# Patient Record
Sex: FEMALE | Race: WHITE | NOT HISPANIC OR LATINO | Employment: FULL TIME | URBAN - METROPOLITAN AREA
[De-identification: names, ages, dates, MRNs, and addresses within clinical notes are randomized per-mention and may not be internally consistent; named-entity substitution may affect disease eponyms.]

---

## 2017-05-01 ENCOUNTER — ALLSCRIPTS OFFICE VISIT (OUTPATIENT)
Dept: OTHER | Facility: OTHER | Age: 27
End: 2017-05-01

## 2017-05-15 ENCOUNTER — GENERIC CONVERSION - ENCOUNTER (OUTPATIENT)
Dept: OTHER | Facility: OTHER | Age: 27
End: 2017-05-15

## 2017-06-08 ENCOUNTER — ALLSCRIPTS OFFICE VISIT (OUTPATIENT)
Dept: OTHER | Facility: OTHER | Age: 27
End: 2017-06-08

## 2017-06-08 ENCOUNTER — TRANSCRIBE ORDERS (OUTPATIENT)
Dept: LAB | Facility: CLINIC | Age: 27
End: 2017-06-08

## 2017-06-08 ENCOUNTER — APPOINTMENT (OUTPATIENT)
Dept: LAB | Facility: CLINIC | Age: 27
End: 2017-06-08
Payer: COMMERCIAL

## 2017-06-08 DIAGNOSIS — N83.202 CYST OF LEFT OVARY: ICD-10-CM

## 2017-06-08 DIAGNOSIS — R10.13 EPIGASTRIC PAIN: ICD-10-CM

## 2017-06-08 DIAGNOSIS — J15.9 BACTERIAL PNEUMONIA: ICD-10-CM

## 2017-06-08 DIAGNOSIS — N17.9 ACUTE KIDNEY FAILURE (HCC): ICD-10-CM

## 2017-06-08 LAB
ALBUMIN SERPL BCP-MCNC: 3.1 G/DL (ref 3.5–5)
ALP SERPL-CCNC: 73 U/L (ref 46–116)
ALT SERPL W P-5'-P-CCNC: 29 U/L (ref 12–78)
ANION GAP SERPL CALCULATED.3IONS-SCNC: 9 MMOL/L (ref 4–13)
AST SERPL W P-5'-P-CCNC: 20 U/L (ref 5–45)
BASOPHILS # BLD AUTO: 0.02 THOUSANDS/ΜL (ref 0–0.1)
BASOPHILS NFR BLD AUTO: 0 % (ref 0–1)
BILIRUB SERPL-MCNC: 0.38 MG/DL (ref 0.2–1)
BUN SERPL-MCNC: 6 MG/DL (ref 5–25)
CALCIUM SERPL-MCNC: 8.8 MG/DL (ref 8.3–10.1)
CHLORIDE SERPL-SCNC: 105 MMOL/L (ref 100–108)
CO2 SERPL-SCNC: 30 MMOL/L (ref 21–32)
CREAT SERPL-MCNC: 1.4 MG/DL (ref 0.6–1.3)
EOSINOPHIL # BLD AUTO: 0.13 THOUSAND/ΜL (ref 0–0.61)
EOSINOPHIL NFR BLD AUTO: 2 % (ref 0–6)
ERYTHROCYTE [DISTWIDTH] IN BLOOD BY AUTOMATED COUNT: 14.1 % (ref 11.6–15.1)
GFR SERPL CREATININE-BSD FRML MDRD: 45.5 ML/MIN/1.73SQ M
GLUCOSE P FAST SERPL-MCNC: 107 MG/DL (ref 65–99)
HCT VFR BLD AUTO: 37.3 % (ref 34.8–46.1)
HGB BLD-MCNC: 12 G/DL (ref 11.5–15.4)
LYMPHOCYTES # BLD AUTO: 1.05 THOUSANDS/ΜL (ref 0.6–4.47)
LYMPHOCYTES NFR BLD AUTO: 12 % (ref 14–44)
MCH RBC QN AUTO: 28 PG (ref 26.8–34.3)
MCHC RBC AUTO-ENTMCNC: 32.2 G/DL (ref 31.4–37.4)
MCV RBC AUTO: 87 FL (ref 82–98)
MONOCYTES # BLD AUTO: 0.81 THOUSAND/ΜL (ref 0.17–1.22)
MONOCYTES NFR BLD AUTO: 9 % (ref 4–12)
NEUTROPHILS # BLD AUTO: 6.93 THOUSANDS/ΜL (ref 1.85–7.62)
NEUTS SEG NFR BLD AUTO: 77 % (ref 43–75)
NRBC BLD AUTO-RTO: 0 /100 WBCS
PLATELET # BLD AUTO: 276 THOUSANDS/UL (ref 149–390)
PMV BLD AUTO: 11 FL (ref 8.9–12.7)
POTASSIUM SERPL-SCNC: 3.3 MMOL/L (ref 3.5–5.3)
PROT SERPL-MCNC: 6.8 G/DL (ref 6.4–8.2)
RBC # BLD AUTO: 4.29 MILLION/UL (ref 3.81–5.12)
SODIUM SERPL-SCNC: 144 MMOL/L (ref 136–145)
WBC # BLD AUTO: 8.96 THOUSAND/UL (ref 4.31–10.16)

## 2017-06-08 PROCEDURE — 80053 COMPREHEN METABOLIC PANEL: CPT

## 2017-06-08 PROCEDURE — 36415 COLL VENOUS BLD VENIPUNCTURE: CPT

## 2017-06-08 PROCEDURE — 85025 COMPLETE CBC W/AUTO DIFF WBC: CPT

## 2017-06-23 ENCOUNTER — ALLSCRIPTS OFFICE VISIT (OUTPATIENT)
Dept: OTHER | Facility: OTHER | Age: 27
End: 2017-06-23

## 2017-08-10 ENCOUNTER — GENERIC CONVERSION - ENCOUNTER (OUTPATIENT)
Dept: OTHER | Facility: OTHER | Age: 27
End: 2017-08-10

## 2017-08-14 ENCOUNTER — ALLSCRIPTS OFFICE VISIT (OUTPATIENT)
Dept: OTHER | Facility: OTHER | Age: 27
End: 2017-08-14

## 2017-08-31 ENCOUNTER — TRANSCRIBE ORDERS (OUTPATIENT)
Dept: SLEEP CENTER | Facility: CLINIC | Age: 27
End: 2017-08-31

## 2017-08-31 DIAGNOSIS — R40.0 DAYTIME SLEEPINESS: Primary | ICD-10-CM

## 2017-11-01 ENCOUNTER — ALLSCRIPTS OFFICE VISIT (OUTPATIENT)
Dept: OTHER | Facility: OTHER | Age: 27
End: 2017-11-01

## 2017-11-03 ENCOUNTER — HOSPITAL ENCOUNTER (OUTPATIENT)
Dept: SLEEP CENTER | Facility: CLINIC | Age: 27
Discharge: HOME | End: 2017-11-03
Payer: COMMERCIAL

## 2017-11-03 ENCOUNTER — TRANSCRIBE ORDERS (OUTPATIENT)
Dept: SLEEP CENTER | Facility: CLINIC | Age: 27
End: 2017-11-03

## 2017-11-03 DIAGNOSIS — G47.33 OBSTRUCTIVE SLEEP APNEA SYNDROME: Primary | ICD-10-CM

## 2017-11-03 DIAGNOSIS — R40.0 DAYTIME SLEEPINESS: ICD-10-CM

## 2017-11-03 NOTE — PROGRESS NOTES
Consultation - [address]  32 y o  female  :1990  SSC:[id number]    Physician Requesting Consult: Rosina Shirley MD     Reason for Consult : At your kind request I saw this patient for initial sleep evaluation today  The patient is here to evaluate for suspected Obstructive Sleep Apnea  Medications, Past, Family and Social Histories were reviewed  Comorbidities are notable for:  Mood disturbance and irritability bowel syndrome  Herewith findings in summary  Full details are available from our records  HPI:  She presents with a complaint of fatigue of several years duration that has become extreme in the past 4 years  She is falling asleep inadvertently after meals and at work (that involves "sitting at a computer all day")  She is tired and drowsy irrespective of getting sufficient sleep and she feels her depression is controlled  However, she reports approximately 80 pounds weight gain since starting antidepressant medication (Celexa) around 4 years ago  She snores intermittently and this has gotten worse in the past 2 years  It can be loud enough to disturb her bed partner  She is not aware of breathing difficulties during sleep  She reported restless leg symptoms but very infrequently around once in 2 months  She sleep talks but reported no other features of parasomnia  She reported none of the ancillary symptoms of narcolepsy  Sleep Routine:  She is spending just under 9 hours in bed  She usually falls asleep in less than 10 minutes  She has no interruptions of sleep  She struggles to awaken with multiple alarms  She rated herself at 14/24 on the [place] Sleepiness Scale  Habits:  She is a former smoker  , she uses alcohol rarely  ,  has no drug history on file  , she uses limited caffeine  She does not exercise  ROS:  She reports GERDand cough  A 10  A 10 point review of systems was otherwise unremarkable      Physical Exam: Salient findings on exam, are a body mass index 39 9  Vitals are stable  She has a constricted affect  Mental state otherwise appears normal   Craniofacial anatomy is normal  Neck Circumference is 42 centimeters  There is excess fatty tissue and neck is thick  There are no abnormal neck masses  Nasal airway s patent]  Mucous membranes appeared normal  The oral airway is crowded  Base of tongue is at Modified Mallampati class III  [She has truncal obesity  The rest of her exam was unremarkable  Impression:   1  Possible obstructive sleep apnea  2  Restless leg syndrome  3  Hypersomnolence secondary to the above  4  Mood disturbance is contributing to her symptoms  5  Obesity     Plan:  1  With respect to above conditions, I counseled on pathophysiology, diagnosis, treatment options, risks and benefits; interrelationship and effects on symptoms and comorbidities; risks of no treatment; costs and insurance aspects  2  I advised on weight reduction, avoiding sleeping supine, using alcohol or sedating medications close to bed time and on safe driving practices  3  Nocturnal polysomnography is indicated and a diagnostic study will be scheduled  4  Patient is willing to try Positive airway pressure therapy and will be scheduled for a titration study if indicated  5  Follow-up will be scheduled after the studies to review results, further details of treatment options and to initiate/adjust therapy  Thank you for allowing me to participate in the care of this patient  I will keep you apprised of developments         Sincerely,    Vitor Garcia MD  Board Certified Specialist

## 2017-11-13 ENCOUNTER — HOSPITAL ENCOUNTER (EMERGENCY)
Facility: HOSPITAL | Age: 27
Discharge: HOME | End: 2017-11-13
Attending: EMERGENCY MEDICINE | Admitting: EMERGENCY MEDICINE
Payer: COMMERCIAL

## 2017-11-13 VITALS
OXYGEN SATURATION: 95 % | WEIGHT: 210 LBS | SYSTOLIC BLOOD PRESSURE: 126 MMHG | DIASTOLIC BLOOD PRESSURE: 68 MMHG | HEART RATE: 107 BPM | RESPIRATION RATE: 16 BRPM | TEMPERATURE: 98.2 F

## 2017-11-13 DIAGNOSIS — K62.5 RECTAL BLEEDING: Primary | ICD-10-CM

## 2017-11-13 LAB
ANION GAP BLD CALC-SCNC: 20 MMOL/L (ref 4–13)
BACTERIA UR QL AUTO: ABNORMAL /HPF
BILIRUB UR QL STRIP: NEGATIVE
BUN BLD-MCNC: 9 MG/DL (ref 5–25)
CA-I BLD-SCNC: 1.18 MMOL/L (ref 1.12–1.32)
CHLORIDE BLD-SCNC: 99 MMOL/L (ref 100–108)
CLARITY UR: CLEAR
COLOR UR: YELLOW
CREAT BLD-MCNC: 0.9 MG/DL (ref 0.6–1.3)
EXT PREG TEST URINE: NEGATIVE
GFR SERPL CREATININE-BSD FRML MDRD: 88 ML/MIN/1.73SQ M
GLUCOSE SERPL-MCNC: 116 MG/DL (ref 65–140)
GLUCOSE UR STRIP-MCNC: NEGATIVE MG/DL
HCT VFR BLD CALC: 40 % (ref 34.8–46.1)
HGB BLDA-MCNC: 13.6 G/DL (ref 11.5–15.4)
HGB UR QL STRIP.AUTO: ABNORMAL
KETONES UR STRIP-MCNC: NEGATIVE MG/DL
LEUKOCYTE ESTERASE UR QL STRIP: ABNORMAL
NITRITE UR QL STRIP: NEGATIVE
NON-SQ EPI CELLS URNS QL MICRO: ABNORMAL /HPF
PCO2 BLD: 25 MMOL/L (ref 21–32)
PH UR STRIP.AUTO: 6 [PH] (ref 4.5–8)
POTASSIUM BLD-SCNC: 3.7 MMOL/L (ref 3.5–5.3)
PROT UR STRIP-MCNC: NEGATIVE MG/DL
RBC #/AREA URNS AUTO: ABNORMAL /HPF
SODIUM BLD-SCNC: 139 MMOL/L (ref 136–145)
SP GR UR STRIP.AUTO: 1.01 (ref 1–1.03)
SPECIMEN SOURCE: ABNORMAL
UROBILINOGEN UR QL STRIP.AUTO: 0.2 E.U./DL
WBC #/AREA URNS AUTO: ABNORMAL /HPF

## 2017-11-13 PROCEDURE — 80047 BASIC METABLC PNL IONIZED CA: CPT

## 2017-11-13 PROCEDURE — 99284 EMERGENCY DEPT VISIT MOD MDM: CPT

## 2017-11-13 PROCEDURE — 81001 URINALYSIS AUTO W/SCOPE: CPT

## 2017-11-13 PROCEDURE — 81025 URINE PREGNANCY TEST: CPT | Performed by: EMERGENCY MEDICINE

## 2017-11-13 PROCEDURE — 85014 HEMATOCRIT: CPT

## 2017-11-13 PROCEDURE — 81002 URINALYSIS NONAUTO W/O SCOPE: CPT | Performed by: EMERGENCY MEDICINE

## 2017-11-13 NOTE — ED PROVIDER NOTES
History  Chief Complaint   Patient presents with    Rectal Bleeding     Pt states that she has been having pain while having a bowel movment x 2 weeks  Pt states for the past week she has noticed rectal bleeding when having a bm  Pt states today she urinated and stated she had "a lot" of rectal bleeding  Pt states she only has bleeding from her rectum when going to the bathroom and is not currently bleeding      A 80-year-old female with past medical history of IBS; presents with bright red blood per rectum  Patient states over the past few months she has been noticing intermittent episodes of blood within her stool  Patient states that she has pain with moving her bowels every time  Patient reports having loose stools baseline, denies straining  Patient states that today when moving her bowels she noticed blood, however during a later episode of urination she noticed blood with wiping  Patient does not notice blood within her underwear  Patient has since urinated without blood  Patient denies associated abdominal pain  Patient denies fever, chills, lightheadedness, dizziness, chest pain, shortness of breath, abdominal pain, dysuria, urinary frequency/urgency, hematuria, vaginal discharge/bleeding, peripheral edema and rashes  Patient does follow with a GI physician however has not seen them for the symptoms  A/P:  Rectal bleeding, symptoms and exam suspicious for internal hemorrhoid  Will check hemoglobin for anemia given prolonged course of symptoms  Will hold on any additional stool softeners given that patient has loose stools at baseline her GI physician for further evaluation  History provided by:  Patient      Prior to Admission Medications   Prescriptions Last Dose Informant Patient Reported? Taking?    Hyoscyamine-Phenyltoloxamine 9 4322-64 MG CAPS   Yes Yes   Sig: Take 1 tablet by mouth 3 (three) times a day before meals Taking only prn      Facility-Administered Medications: None       Past Medical History:   Diagnosis Date    GERD (gastroesophageal reflux disease)     IBS (irritable bowel syndrome)        History reviewed  No pertinent surgical history  History reviewed  No pertinent family history  I have reviewed and agree with the history as documented  Social History   Substance Use Topics    Smoking status: Never Smoker    Smokeless tobacco: Never Used    Alcohol use Yes      Comment: socially         Review of Systems   Gastrointestinal: Positive for blood in stool  All other systems reviewed and are negative  Physical Exam  ED Triage Vitals [11/13/17 1432]   Temperature Pulse Respirations Blood Pressure SpO2   98 2 °F (36 8 °C) (!) 120 18 142/76 97 %      Temp Source Heart Rate Source Patient Position - Orthostatic VS BP Location FiO2 (%)   Temporal Monitor Sitting Right arm --      Pain Score       3           Orthostatic Vital Signs  Vitals:    11/13/17 1432 11/13/17 1459   BP: 142/76 126/68   Pulse: (!) 120 (!) 107   Patient Position - Orthostatic VS: Sitting Lying       Physical Exam   General Appearance: alert and oriented, nad, non toxic appearing  Skin:  Warm, dry, intact  HEENT: atraumatic, normocephalic  Neck: Supple, trachea midline  Cardiac: RRR; no murmurs, rub, gallops  Pulmonary: lungs CTAB; no wheezes, rales, rhonchi  Gastrointestinal: abdomen soft, nontender, nondistended; no guarding or rebound tenderness; good bowel sounds, no mass or bruits  Rectal Exam: No external hemorrhoids or anal fissures noted  Area of inflammation, about size of pea, palpated at 6 o'clock position, area is tender and likely consistent with internal hemorrhoid  Small amount of stool noted within the rectal vault, does have pink color appearance    Extremities:  no pedal edema, 2+ pulses; no calf tenderness, no clubbing, no cyanosis  Neuro:  no focal motor or sensory deficits, CN 2-12 grossly intact  Psych:  Normal mood and affect, normal judgement and insight      ED Medications  Medications - No data to display    Diagnostic Studies  Results Reviewed     Procedure Component Value Units Date/Time    Urine Microscopic [[id number]]  (Abnormal) Collected:  11/13/17 1636    Lab Status:  Final result Specimen:  Urine from Urine, Clean Catch Updated:  11/13/17 1549     RBC, UA 2-4 (A) /hpf      WBC, UA 2-4 (A) /hpf      Epithelial Cells Occasional /hpf      Bacteria, UA Occasional /hpf     POCT pregnancy, urine [[id number]]  (Normal) Resulted:  11/13/17 1522    Lab Status:  Final result Updated:  11/13/17 1522     EXT PREG TEST UR (Ref: Negative) negative    POCT urinalysis dipstick [[id number]]  (Abnormal) Resulted:  11/13/17 1522    Lab Status:  Final result Specimen:  Urine Updated:  11/13/17 1522    POCT Chem 8+ [[id number]]  (Abnormal) Collected:  11/13/17 1517    Lab Status:  Final result Updated:  11/13/17 1522     SODIUM, I-STAT 139 mmol/l      Potassium, i-STAT 3 7 mmol/L      Chloride, istat 99 (L) mmol/L      CO2, i-STAT 25 mmol/L      Anion Gap, Istat 20 (H) mmol/L      Calcium, Ionized i-STAT 1 18 mmol/L      BUN, I-STAT 9 mg/dl      Creatinine, i-STAT 0 9 mg/dl      eGFR 88 ml/min/1 73sq m      Glucose, i-STAT 116 mg/dl      Hct, i-STAT 40 %      Hgb, i-STAT 13 6 g/dl      Specimen Type VENOUS    ED Urine Macroscopic [[id number]]  (Abnormal) Collected:  11/13/17 1636    Lab Status:  Final result Specimen:  Urine Updated:  11/13/17 1521     Color, UA Yellow     Clarity, UA Clear     pH, UA 6 0     Leukocytes, UA Trace (A)     Nitrite, UA Negative     Protein, UA Negative mg/dl      Glucose, UA Negative mg/dl      Ketones, UA Negative mg/dl      Urobilinogen, UA 0 2 E U /dl      Bilirubin, UA Negative     Blood, UA Trace (A)     Specific [place], UA 1 015    Narrative:       CLINITEK RESULT                 No orders to display         Procedures  Procedures      Phone Consults  ED Phone Contact    ED Course  ED Course as of Nov 13 1729 Mon Nov 13, 2017   1523 Hemoglobin, Istat: 13 6                               [facility]  CritCare Time    Disposition  Final diagnoses:   Rectal bleeding     Time reflects when diagnosis was documented in both MDM as applicable and the Disposition within this note     Time User Action Codes Description Comment    11/13/2017  3:00 PM Nick, [address] [[address] 5] Rectal bleeding       ED Disposition     ED Disposition Condition Comment    Discharge  Chelsea Mehta discharge to home/self care  Condition at discharge: Good        Follow-up Information     Follow up With Specialties Details Why Contact Info Additional Information    GI Physician  Schedule an appointment as soon as possible for a visit today For re-evaluation      [address]  Emergency Department Emergency Medicine Go to If symptoms worsen [facility] [facility]  [phone] [place] [facility], [address] [place] [address], [id number]        There are no discharge medications for this patient  No discharge procedures on file  ED Provider  Attending physically available and evaluated Chelsea Mehta  I managed the patient along with the ED Attending      Electronically Signed by         Jaquelin Zhang DO  Resident  11/13/17 0040

## 2017-11-13 NOTE — DISCHARGE INSTRUCTIONS
Hemorrhoids   AMBULATORY CARE:   Hemorrhoids  are swollen blood vessels inside your rectum (internal hemorrhoids) or on your anus (external hemorrhoids)  Sometimes a hemorrhoid may prolapse  This means it extends out of your anus  Common symptoms include the following:   · Pain or itching around your anus or inside your rectum    · Swelling or bumps around your anus    · Bright red blood in your bowel movement, on the toilet paper, or in the toilet bowl    · Tissue bulging out of your anus (prolapsed hemorrhoids)    · Incontinence (poor control over urine or bowel movements)  Seek care immediately if:   · You have severe pain in your rectum or around your anus  · You have severe pain in your abdomen and you are vomiting  · You have bleeding from your anus that soaks through your underwear  Contact your healthcare provider if:   · You have frequent and painful bowel movements  · Your hemorrhoid looks or feels more swollen than usual      · You do not have a bowel movement for 2 days or more  · You see or feel tissue coming through your anus  · You have questions or concerns about your condition or care  Treatment for hemorrhoids  may include medicines to decrease pain, swelling, and itching  The medicine may be a pill, pad, cream, or ointment  Medicine may also be given to soften your bowel movement  Surgery and other procedures may be needed to shrink or remove your hemorrhoids  Manage your symptoms:   · Apply ice on your anus for 15 to 20 minutes every hour or as directed  Use an ice pack, or put crushed ice in a plastic bag  Cover it with a towel before you apply it to your anus  Ice helps prevent tissue damage and decreases swelling and pain  · Take a sitz bath  Fill a bathtub with 4 to 6 inches of warm water  You may also use a sitz bath pan that fits inside a toilet bowl  Sit in the sitz bath for 15 minutes  Do this 3 times a day, and after each bowel movement   The warm water can help decrease pain and swelling  · Keep your anal area clean  Gently wash the area with warm water daily  Soap may irritate the area  After a bowel movement, wipe with moist towelettes or wet toilet paper  Dry toilet paper can irritate the area  Prevent hemorrhoids:   · Do not strain to have a bowel movement  Do not sit on the toilet too long  These actions can increase pressure on the tissues in your rectum and anus  · Drink plenty of liquids  Liquids can help prevent constipation  Ask how much liquid to drink each day and which liquids are best for you  · Eat a variety of high-fiber foods  Examples include fruits, vegetables, and whole grains  Ask your healthcare provider how much fiber you need each day  You may need to take a fiber supplement  · Exercise as directed  Exercise, such as walking, may make it easier to have a bowel movement  Ask your healthcare provider to help you create an exercise plan  · Do not have anal sex  Anal sex can weaken the skin around your rectum and anus  · Avoid heavy lifting  This can cause straining and increase your risk for another hemorrhoid  Follow up with your healthcare provider as directed:  Write down your questions so you remember to ask them during your visits  © 2017 [employer] Information is for End User's use only and may not be sold, redistributed or otherwise used for commercial purposes  All illustrations and images included in CareNotes® are the copyrighted property of A D A M [employer] Inc  or Erlin Álvarez  The above information is an  only  It is not intended as medical advice for individual conditions or treatments  Talk to your doctor, nurse or pharmacist before following any medical regimen to see if it is safe and effective for you

## 2017-11-13 NOTE — ED ATTENDING ATTESTATION
Joanne Temple MD, saw and evaluated the patient  I have discussed the patient with the resident/non-physician practitioner and agree with the resident's/non-physician practitioner's findings, Plan of Care, and MDM as documented in the resident's/non-physician practitioner's note, except where noted  All available labs and Radiology studies were reviewed  At this point I agree with the current assessment done in the Emergency Department  I have conducted an independent evaluation of this patient a history and physical is as follows:      Critical Care Time  CritCare Time      Pt  Has pain with bm's over the past few months  She had  bright red blood per rectum over the past few days  Today she had a bm and some discomfort and then noticed some blood in the stool and then noticed it when wiping later  Pt  Normally isn't constipated and has somewhat loose stools from IBS  No abd  Pain, no fevers, no n/v/d    Pt  Has a hx of IBS - sees GI specialist    Not on blood thinners    Well-appearing, no distress, RRR, CTA b/l, abd  -nontender, rectal exam, +internal hemorrhoid - mildly tender, no external hemorrhoid, no fissures, heme occult positive, control positive    H/H stable - pt  Make follow up with GI dr  As an outpt

## 2017-11-13 NOTE — ED NOTES
Chaperoned resident for patient's rectal exam   No obvious fissures or external findings  Patient reports for the past couple months has noticed blood with BMs, but not every time  States has rectal burning with BM's  Stools are not black  No abdominal pain or dizziness          Michelle Cedeño RN  11/13/17 [address] [place] [address] [place] a[place]blo  11/13/17 5979

## 2017-11-24 ENCOUNTER — ALLSCRIPTS OFFICE VISIT (OUTPATIENT)
Dept: OTHER | Facility: OTHER | Age: 27
End: 2017-11-24

## 2017-11-24 DIAGNOSIS — K92.1 MELENA: ICD-10-CM

## 2017-11-24 DIAGNOSIS — R19.7 DIARRHEA: ICD-10-CM

## 2017-11-24 DIAGNOSIS — K52.9 NONINFECTIVE GASTROENTERITIS AND COLITIS: ICD-10-CM

## 2017-11-25 NOTE — PROGRESS NOTES
Assessment  1  Blood in stool (578 1) (K92 1)    Plan  Blood in stool    · (1) BASIC METABOLIC PROFILE; Status:Active; Requested DQK:[id number];    Perform:[facility]; [place]:[id number]; Ordered; For:Blood in stool; Ordered By:Alverto Roman;   · (1) CBC/PLT/DIFF; Status:Active; Requested [id number];    Perform:[facility]; JFO:07VHE3286; Ordered; For:Blood in stool; Ordered By:Alverto Roman;   · (1) C-REACTIVE PROTEIN; Status:Active; Requested LNB:[id number];    Perform:[facility]; EED:40IQN9420; Ordered; For:Blood in stool; Ordered By:Alverto Roman;   · (1) HEPATIC FUNCTION PANEL; Status:Active; Requested GSH:[id number];    Perform:[facility]; [id number]; Ordered; For:Blood in stool; Ordered By:Alverto Roman;   · (1) LACTOFERRIN, STOOL; Status:Active; Requested KBD:31QDV0026;    Perform:[facility]; HIS:15WCO6121; Ordered; For:Blood in stool; Ordered By:Alverto Roman;   · (1) SED RATE; Status:Active; Requested QJB:[id number];    Perform:[facility]; PVF:68TAV7973; Ordered; For:Blood in stool; Ordered By:Alverto Roman;   · COLONOSCOPY (GI, SURG); Status:Active; Requested UUJ:38QVV4602;    Perform:[facility]; DYP:50ENH6450; Last Updated Asher Pernell; 2017 2:52:08 PM;Ordered; For:Blood in stool; Ordered By:Alverto Roman;   · EGD; Status:Active; Requested IZV:89XIK5760;    Perform:[facility]; [phone]; Last Updated Asher Pernell; 2017 2:52:08 PM;Ordered;in stool; Ordered By:Yamile Roman;   · Follow-up visit in 2 months Evaluation and Treatment  Follow-up  Status: Hold For -Scheduling  Requested for: 03TVH2719   Ordered; For: Blood in stool; Ordered By: Kylee Ballesteros Performed:  Due: 28TIT8335  Depression with anxiety    · Citalopram Hydrobromide 10 MG Oral Tablet   Rx By: Doreen Correa; Dispense: 14 Days ; #:14 Tablet; Refill: 0;Depression with anxiety; JOHN = N; Record; Msg to Pharmacy: Take for 2 weeks then stop;  Last Updated By: Brando Allison; 11/24/2017 2:14:50 PM    Discussion/Summary  Discussion Summary:   Rectal bleeding with loose stools:CBC to rule out anemialabs to evaluate for inflammatory bowel disease  Schedule colonoscopy with TI intubation to evaluate for inflammatory bowel disease  Acid reflux:not relieved by PPI  It is possible it is caused by significant weight gain  Schedule EGD as patient has GI bleeding  Patient was counseled on the benefits and risks of endoscopic intervention including but not limited to bleeding, infection, bowel perforation  Counseling Documentation With Imm: The patient was counseled regarding diagnostic results,-- instructions for management,-- impressions,-- risks and benefits of treatment options  Chief Complaint  Chief Complaint Free Text Note Form: f/u for IBS with diarrhea  Pt c/o blood in stool, painful bm's, and abdominal pain  History of Present Illness  HPI: 80-year-old female presented for follow-up on loose stool  Patient was seen last year for irritable bowel syndrome and she lost follow-up with us  Patient reported she has been having blood in stool in the past several months, in the past 1 week she reported blood clot  Patient also reported a notice from comfort and pain while she is passing the stool  She reported going to the bathroom 3 to 4 times a day  Mostly loose stool  She reported intermittent abdominal pain  She underwent a colonoscopy with an gastroenterologist from another practice in 2015 and was told she has IBS  Patient denies weight loss, rather she gained significant amount of weight in the past 2 years  She also reported her acid reflux has worsened  She has been taking omeprazole 20 mg a day with partial relief of his symptoms  Review of Systems  Complete-Female GI Adult:  Constitutional: No fever, no chills, feels well, no tiredness, no recent weight gain or weight loss    Eyes: No complaints of eye pain, no red eyes, no eyesight problems, no discharge, no dry eyes, no itching of eyes  ENT: no complaints of earache, no loss of hearing, no nose bleeds, no nasal discharge, no sore throat, no hoarseness  Cardiovascular: No complaints of slow heart rate, no fast heart rate, no chest pain, no palpitations, no leg claudication, no lower extremity edema  Respiratory: No complaints of shortness of breath, no wheezing, no cough, no SOB on exertion, no orthopnea, no PND  Gastrointestinal: abdominal pain-- and-- bloody stools, but-- as noted in HPI  Genitourinary: No complaints of dysuria, no incontinence, no pelvic pain, no dysmenorrhea, no vaginal discharge or bleeding  Musculoskeletal: No complaints of arthralgias, no myalgias, no joint swelling or stiffness, no limb pain or swelling  Integumentary: No complaints of skin rash or lesions, no itching, no skin wounds, no breast pain or lump  Neurological: No complaints of headache, no confusion, no convulsions, no numbness, no dizziness or fainting, no tingling, no limb weakness, no difficulty walking  Psychiatric: Not suicidal, no sleep disturbance, no anxiety or depression, no change in personality, no emotional problems  Endocrine: No complaints of proptosis, no hot flashes, no muscle weakness, no deepening of the voice, no feelings of weakness  Hematologic/Lymphatic: No complaints of swollen glands, no swollen glands in the neck, does not bleed easily, does not bruise easily  Active Problems  1  Acute bacterial sinusitis (461 9) (J01 90,B96 89)   2  Acute kidney injury (584 9) (N17 9)   3  Anxiety, generalized (300 02) (F41 1)   4  Candidiasis of skin (112 3) (B37 2)   5  Chronic diarrhea (787 91) (K52 9)   6  Chronic fatigue (780 79) (R53 82)   7  Community acquired bacterial pneumonia (482 9) (J15 9)   8  Depression with anxiety (300 4) (F41 8)   9  Dyspepsia (536 8) (R10 13)   10  Herpes labialis (054 9) (B00 1)   11  Irritable bowel syndrome with diarrhea (564 1) (K58 0)   12   Left ovarian cyst (620 2) (N83 202) 13  Sleep pattern disturbance (780 50) (G47 20)   14  Strain of thoracic region, initial encounter (847 1) (S29 019A)   15  Visit for routine gyn exam (V72 31) (Z01 419)    Past Medical History  1  History of Encounter to establish care with new doctor (V65 8) (Z76 89)   2  History of upper respiratory infection (V12 09) (Z87 09)  Active Problems And Past Medical History Reviewed: The active problems and past medical history were reviewed and updated today  Surgical History  1  History of Ankle Surgery   2  History of Wrist Surgery    Family History  Mother    1  Denied: Family history of Colon cancer   2  Denied: Family history of colonic polyps   3  Denied: Family history of Crohn's disease   4  Denied: Family history of liver disease   5  Family history of Melanoma  Father    6  Denied: Family history of Colon cancer   7  Denied: Family history of colonic polyps   8  Denied: Family history of Crohn's disease   9  Denied: Family history of liver disease  Maternal Grandmother    8  Family history of colitis (V18 59) (Z83 79)    Social History     · Never a smoker   · No alcohol use   · No drug use    Current Meds   1  Citalopram Hydrobromide 10 MG Oral Tablet; TAKE 1 TABLET ONCE  Requested for: 88IFH1485; Last Rx:01Nov2017 Ordered   2  Hyoscyamine TABS; Therapy: (Recorded:57Ufq5235) to Recorded   3  Nortriptyline HCl - 10 MG Oral Capsule; TAKE 1 CAPSULE Once At Bedtime; Therapy: 61JDH1186 to (Evaluate:11Eaw3149)  Requested for: 69CHX9843; Last Rx:01Nov2017 Ordered   4  Nortriptyline HCl - 25 MG Oral Capsule; TAKE 1 CAPSULE AT BEDTIME; Therapy: 35CBH4377 to (Evaluate:92Bty7263)  Requested for: 77CVU2226; Last Rx:01Nov2017 Ordered   5  Omeprazole 20 MG Oral Capsule Delayed Release; TAKE ONE CAPSULE BY MOUTH EVERY DAY  Requested for: 23Zfb2211; Last Rx:36Yla6230 Ordered  Medication List Reviewed: The medication list was reviewed and updated today  Allergies  1  No Known Drug Allergies  2   Animal dander   3  Dust   4  Mold   5  Seasonal    Vitals  Vital Signs    Recorded: 87WRA8141 02:14PM   Temperature 97 9 F, Tympanic   Heart Rate 222   Systolic 521   Diastolic 74   Height 5 ft 2 in   Weight 229 lb    BMI Calculated 41 88   BSA Calculated 2 02   O2 Saturation 98, RA       Physical Exam   Constitutional Morbid obesity  Eyes  Conjunctiva and lids: No swelling, erythema or discharge  Ears, Nose, Mouth, and Throat  Oropharynx: Normal with no erythema, edema, exudate or lesions  Pulmonary  Respiratory effort: No increased work of breathing or signs of respiratory distress  Cardiovascular  Examination of extremities for edema and/or varicosities: Normal    Abdomen  Abdomen: Non-tender, no masses  Musculoskeletal  Gait and station: Normal    Psychiatric  Orientation to person, place, and time: Normal          Future Appointments    Date/Time Provider Specialty Site   01/04/2018 02:15 PM Keith Luong MD Gastroenterology Adult [facility] OUT[facility]IENT   12/04/2017 04:00 PM NHAN Ely   Psychiatry [facility] 81       Signatures   Electronically signed by : Migue Abreu MD; Nov 24 2017  3:41PM EST                       (Author)

## 2017-11-28 ENCOUNTER — HOSPITAL ENCOUNTER (EMERGENCY)
Facility: HOSPITAL | Age: 27
Discharge: HOME | End: 2017-11-29
Attending: EMERGENCY MEDICINE | Admitting: EMERGENCY MEDICINE
Payer: COMMERCIAL

## 2017-11-28 ENCOUNTER — APPOINTMENT (OUTPATIENT)
Dept: LAB | Facility: HOSPITAL | Age: 27
End: 2017-11-28
Attending: INTERNAL MEDICINE
Payer: COMMERCIAL

## 2017-11-28 ENCOUNTER — GENERIC CONVERSION - ENCOUNTER (OUTPATIENT)
Dept: OTHER | Facility: OTHER | Age: 27
End: 2017-11-28

## 2017-11-28 ENCOUNTER — TRANSCRIBE ORDERS (OUTPATIENT)
Dept: LAB | Facility: HOSPITAL | Age: 27
End: 2017-11-28

## 2017-11-28 ENCOUNTER — APPOINTMENT (EMERGENCY)
Dept: CT IMAGING | Facility: HOSPITAL | Age: 27
End: 2017-11-28
Payer: COMMERCIAL

## 2017-11-28 VITALS
TEMPERATURE: 98.4 F | HEART RATE: 113 BPM | DIASTOLIC BLOOD PRESSURE: 72 MMHG | RESPIRATION RATE: 18 BRPM | SYSTOLIC BLOOD PRESSURE: 126 MMHG | OXYGEN SATURATION: 98 %

## 2017-11-28 DIAGNOSIS — R10.30 LOWER ABDOMINAL PAIN: Primary | ICD-10-CM

## 2017-11-28 DIAGNOSIS — Z87.19 HISTORY OF BLOODY STOOLS: ICD-10-CM

## 2017-11-28 DIAGNOSIS — K52.9 COLITIS: ICD-10-CM

## 2017-11-28 DIAGNOSIS — K92.1 MELENA: ICD-10-CM

## 2017-11-28 LAB
ALBUMIN SERPL BCP-MCNC: 3.3 G/DL (ref 3.5–5)
ALBUMIN SERPL BCP-MCNC: 3.4 G/DL (ref 3.5–5)
ALP SERPL-CCNC: 81 U/L (ref 46–116)
ALP SERPL-CCNC: 85 U/L (ref 46–116)
ALT SERPL W P-5'-P-CCNC: 40 U/L (ref 12–78)
ALT SERPL W P-5'-P-CCNC: 43 U/L (ref 12–78)
ANION GAP SERPL CALCULATED.3IONS-SCNC: 6 MMOL/L (ref 4–13)
ANION GAP SERPL CALCULATED.3IONS-SCNC: 8 MMOL/L (ref 4–13)
AST SERPL W P-5'-P-CCNC: 22 U/L (ref 5–45)
AST SERPL W P-5'-P-CCNC: 23 U/L (ref 5–45)
BASOPHILS # BLD AUTO: 0 THOUSANDS/ΜL (ref 0–0.1)
BASOPHILS # BLD AUTO: 0.02 THOUSANDS/ΜL (ref 0–0.1)
BASOPHILS NFR BLD AUTO: 0 % (ref 0–1)
BASOPHILS NFR BLD AUTO: 0 % (ref 0–1)
BILIRUB DIRECT SERPL-MCNC: 0.14 MG/DL (ref 0–0.2)
BILIRUB SERPL-MCNC: 0.45 MG/DL (ref 0.2–1)
BILIRUB SERPL-MCNC: 0.51 MG/DL (ref 0.2–1)
BILIRUB UR QL STRIP: NEGATIVE
BUN SERPL-MCNC: 10 MG/DL (ref 5–25)
BUN SERPL-MCNC: 11 MG/DL (ref 5–25)
CALCIUM SERPL-MCNC: 8.7 MG/DL (ref 8.3–10.1)
CALCIUM SERPL-MCNC: 9 MG/DL (ref 8.3–10.1)
CHLORIDE SERPL-SCNC: 103 MMOL/L (ref 100–108)
CHLORIDE SERPL-SCNC: 106 MMOL/L (ref 100–108)
CLARITY UR: CLEAR
CO2 SERPL-SCNC: 23 MMOL/L (ref 21–32)
CO2 SERPL-SCNC: 27 MMOL/L (ref 21–32)
COLOR UR: YELLOW
CREAT SERPL-MCNC: 0.76 MG/DL (ref 0.6–1.3)
CREAT SERPL-MCNC: 0.86 MG/DL (ref 0.6–1.3)
CRP SERPL QL: 14.2 MG/L
EOSINOPHIL # BLD AUTO: 0.16 THOUSAND/ΜL (ref 0–0.61)
EOSINOPHIL # BLD AUTO: 0.19 THOUSAND/ΜL (ref 0–0.61)
EOSINOPHIL NFR BLD AUTO: 1 % (ref 0–6)
EOSINOPHIL NFR BLD AUTO: 2 % (ref 0–6)
ERYTHROCYTE [DISTWIDTH] IN BLOOD BY AUTOMATED COUNT: 13.7 % (ref 11.6–15.1)
ERYTHROCYTE [DISTWIDTH] IN BLOOD BY AUTOMATED COUNT: 13.8 % (ref 11.6–15.1)
ERYTHROCYTE [SEDIMENTATION RATE] IN BLOOD: 16 MM/HOUR (ref 0–20)
EXT PREG TEST URINE: NEGATIVE
GFR SERPL CREATININE-BSD FRML MDRD: 108 ML/MIN/1.73SQ M
GFR SERPL CREATININE-BSD FRML MDRD: 93 ML/MIN/1.73SQ M
GLUCOSE SERPL-MCNC: 95 MG/DL (ref 65–140)
GLUCOSE SERPL-MCNC: 98 MG/DL (ref 65–140)
GLUCOSE UR STRIP-MCNC: NEGATIVE MG/DL
HCT VFR BLD AUTO: 40 % (ref 34.8–46.1)
HCT VFR BLD AUTO: 40.5 % (ref 34.8–46.1)
HGB BLD-MCNC: 13.2 G/DL (ref 11.5–15.4)
HGB BLD-MCNC: 13.4 G/DL (ref 11.5–15.4)
HGB UR QL STRIP.AUTO: ABNORMAL
KETONES UR STRIP-MCNC: NEGATIVE MG/DL
LEUKOCYTE ESTERASE UR QL STRIP: ABNORMAL
LIPASE SERPL-CCNC: 80 U/L (ref 73–393)
LYMPHOCYTES # BLD AUTO: 1.2 THOUSANDS/ΜL (ref 0.6–4.47)
LYMPHOCYTES # BLD AUTO: 1.38 THOUSANDS/ΜL (ref 0.6–4.47)
LYMPHOCYTES NFR BLD AUTO: 13 % (ref 14–44)
LYMPHOCYTES NFR BLD AUTO: 9 % (ref 14–44)
MCH RBC QN AUTO: 27.6 PG (ref 26.8–34.3)
MCH RBC QN AUTO: 28 PG (ref 26.8–34.3)
MCHC RBC AUTO-ENTMCNC: 33 G/DL (ref 31.4–37.4)
MCHC RBC AUTO-ENTMCNC: 33.1 G/DL (ref 31.4–37.4)
MCV RBC AUTO: 83 FL (ref 82–98)
MCV RBC AUTO: 85 FL (ref 82–98)
MONOCYTES # BLD AUTO: 0.75 THOUSAND/ΜL (ref 0.17–1.22)
MONOCYTES # BLD AUTO: 0.79 THOUSAND/ΜL (ref 0.17–1.22)
MONOCYTES NFR BLD AUTO: 6 % (ref 4–12)
MONOCYTES NFR BLD AUTO: 7 % (ref 4–12)
NEUTROPHILS # BLD AUTO: 10.84 THOUSANDS/ΜL (ref 1.85–7.62)
NEUTROPHILS # BLD AUTO: 8.69 THOUSANDS/ΜL (ref 1.85–7.62)
NEUTS SEG NFR BLD AUTO: 78 % (ref 43–75)
NEUTS SEG NFR BLD AUTO: 84 % (ref 43–75)
NITRITE UR QL STRIP: NEGATIVE
NRBC BLD AUTO-RTO: 0 /100 WBCS
NRBC BLD AUTO-RTO: 0 /100 WBCS
PH UR STRIP.AUTO: 5.5 [PH] (ref 4.5–8)
PLATELET # BLD AUTO: 319 THOUSANDS/UL (ref 149–390)
PLATELET # BLD AUTO: 394 THOUSANDS/UL (ref 149–390)
PMV BLD AUTO: 10.2 FL (ref 8.9–12.7)
PMV BLD AUTO: 10.3 FL (ref 8.9–12.7)
POTASSIUM SERPL-SCNC: 3.5 MMOL/L (ref 3.5–5.3)
POTASSIUM SERPL-SCNC: 3.6 MMOL/L (ref 3.5–5.3)
PROT SERPL-MCNC: 7.1 G/DL (ref 6.4–8.2)
PROT SERPL-MCNC: 7.2 G/DL (ref 6.4–8.2)
PROT UR STRIP-MCNC: ABNORMAL MG/DL
RBC # BLD AUTO: 4.71 MILLION/UL (ref 3.81–5.12)
RBC # BLD AUTO: 4.86 MILLION/UL (ref 3.81–5.12)
SODIUM SERPL-SCNC: 136 MMOL/L (ref 136–145)
SODIUM SERPL-SCNC: 137 MMOL/L (ref 136–145)
SP GR UR STRIP.AUTO: 1.01 (ref 1–1.03)
UROBILINOGEN UR QL STRIP.AUTO: 0.2 E.U./DL
WBC # BLD AUTO: 11.05 THOUSAND/UL (ref 4.31–10.16)
WBC # BLD AUTO: 13.02 THOUSAND/UL (ref 4.31–10.16)

## 2017-11-28 PROCEDURE — 80048 BASIC METABOLIC PNL TOTAL CA: CPT

## 2017-11-28 PROCEDURE — 96374 THER/PROPH/DIAG INJ IV PUSH: CPT

## 2017-11-28 PROCEDURE — 85025 COMPLETE CBC W/AUTO DIFF WBC: CPT

## 2017-11-28 PROCEDURE — 86140 C-REACTIVE PROTEIN: CPT

## 2017-11-28 PROCEDURE — 74177 CT ABD & PELVIS W/CONTRAST: CPT

## 2017-11-28 PROCEDURE — 81002 URINALYSIS NONAUTO W/O SCOPE: CPT | Performed by: EMERGENCY MEDICINE

## 2017-11-28 PROCEDURE — 36415 COLL VENOUS BLD VENIPUNCTURE: CPT

## 2017-11-28 PROCEDURE — 80076 HEPATIC FUNCTION PANEL: CPT

## 2017-11-28 PROCEDURE — 85025 COMPLETE CBC W/AUTO DIFF WBC: CPT | Performed by: EMERGENCY MEDICINE

## 2017-11-28 PROCEDURE — 80053 COMPREHEN METABOLIC PANEL: CPT | Performed by: EMERGENCY MEDICINE

## 2017-11-28 PROCEDURE — 85652 RBC SED RATE AUTOMATED: CPT

## 2017-11-28 PROCEDURE — 96375 TX/PRO/DX INJ NEW DRUG ADDON: CPT

## 2017-11-28 PROCEDURE — 81025 URINE PREGNANCY TEST: CPT | Performed by: EMERGENCY MEDICINE

## 2017-11-28 PROCEDURE — 96361 HYDRATE IV INFUSION ADD-ON: CPT

## 2017-11-28 PROCEDURE — 83690 ASSAY OF LIPASE: CPT | Performed by: EMERGENCY MEDICINE

## 2017-11-28 RX ORDER — MORPHINE SULFATE 4 MG/ML
4 INJECTION, SOLUTION INTRAMUSCULAR; INTRAVENOUS ONCE
Status: COMPLETED | OUTPATIENT
Start: 2017-11-28 | End: 2017-11-28

## 2017-11-28 RX ORDER — ONDANSETRON 2 MG/ML
4 INJECTION INTRAMUSCULAR; INTRAVENOUS ONCE
Status: COMPLETED | OUTPATIENT
Start: 2017-11-28 | End: 2017-11-28

## 2017-11-28 RX ORDER — NORTRIPTYLINE HYDROCHLORIDE 25 MG/1
CAPSULE ORAL
COMMUNITY
Start: 2017-11-15 | End: 2018-04-09 | Stop reason: ALTCHOICE

## 2017-11-28 RX ADMIN — SODIUM CHLORIDE 1000 ML: 0.9 INJECTION, SOLUTION INTRAVENOUS at 22:53

## 2017-11-28 RX ADMIN — ONDANSETRON 4 MG: 2 INJECTION INTRAMUSCULAR; INTRAVENOUS at 22:53

## 2017-11-28 RX ADMIN — IOHEXOL 100 ML: 350 INJECTION, SOLUTION INTRAVENOUS at 23:58

## 2017-11-28 RX ADMIN — MORPHINE SULFATE 4 MG: 4 INJECTION, SOLUTION INTRAMUSCULAR; INTRAVENOUS at 22:55

## 2017-11-29 ENCOUNTER — ALLSCRIPTS OFFICE VISIT (OUTPATIENT)
Dept: OTHER | Facility: OTHER | Age: 27
End: 2017-11-29

## 2017-11-29 LAB
BACTERIA UR QL AUTO: ABNORMAL /HPF
NON-SQ EPI CELLS URNS QL MICRO: ABNORMAL /HPF
RBC #/AREA URNS AUTO: ABNORMAL /HPF
WBC #/AREA URNS AUTO: ABNORMAL /HPF

## 2017-11-29 PROCEDURE — 81001 URINALYSIS AUTO W/SCOPE: CPT

## 2017-11-29 PROCEDURE — 96372 THER/PROPH/DIAG INJ SC/IM: CPT

## 2017-11-29 PROCEDURE — 99284 EMERGENCY DEPT VISIT MOD MDM: CPT

## 2017-11-29 RX ORDER — KETOROLAC TROMETHAMINE 30 MG/ML
30 INJECTION, SOLUTION INTRAMUSCULAR; INTRAVENOUS ONCE
Status: COMPLETED | OUTPATIENT
Start: 2017-11-29 | End: 2017-11-29

## 2017-11-29 RX ORDER — CIPROFLOXACIN 500 MG/1
500 TABLET, FILM COATED ORAL ONCE
Status: COMPLETED | OUTPATIENT
Start: 2017-11-29 | End: 2017-11-29

## 2017-11-29 RX ORDER — DICYCLOMINE HCL 20 MG
20 TABLET ORAL ONCE
Status: COMPLETED | OUTPATIENT
Start: 2017-11-29 | End: 2017-11-29

## 2017-11-29 RX ORDER — KETOROLAC TROMETHAMINE 30 MG/ML
30 INJECTION, SOLUTION INTRAMUSCULAR; INTRAVENOUS ONCE
Status: DISCONTINUED | OUTPATIENT
Start: 2017-11-29 | End: 2017-11-29

## 2017-11-29 RX ORDER — METRONIDAZOLE 500 MG/1
500 TABLET ORAL EVERY 8 HOURS SCHEDULED
Qty: 21 TABLET | Refills: 0 | Status: SHIPPED | OUTPATIENT
Start: 2017-11-29 | End: 2017-12-06

## 2017-11-29 RX ORDER — METRONIDAZOLE 500 MG/1
500 TABLET ORAL ONCE
Status: COMPLETED | OUTPATIENT
Start: 2017-11-29 | End: 2017-11-29

## 2017-11-29 RX ORDER — DICYCLOMINE HCL 20 MG
20 TABLET ORAL EVERY 6 HOURS PRN
Qty: 20 TABLET | Refills: 0 | Status: SHIPPED | OUTPATIENT
Start: 2017-11-29 | End: 2018-12-19

## 2017-11-29 RX ORDER — CIPROFLOXACIN 500 MG/1
500 TABLET, FILM COATED ORAL 2 TIMES DAILY
Qty: 14 TABLET | Refills: 0 | Status: SHIPPED | OUTPATIENT
Start: 2017-11-29 | End: 2017-12-06

## 2017-11-29 RX ADMIN — DICYCLOMINE HYDROCHLORIDE 20 MG: 20 TABLET ORAL at 00:54

## 2017-11-29 RX ADMIN — KETOROLAC TROMETHAMINE 30 MG: 30 INJECTION, SOLUTION INTRAMUSCULAR at 00:55

## 2017-11-29 RX ADMIN — METRONIDAZOLE 500 MG: 500 TABLET ORAL at 00:54

## 2017-11-29 RX ADMIN — CIPROFLOXACIN 500 MG: 500 TABLET, FILM COATED ORAL at 00:54

## 2017-11-29 NOTE — DISCHARGE INSTRUCTIONS
Colitis   WHAT YOU NEED TO KNOW:   Colitis is swelling and irritation of your colon  Colitis may be caused by ulcers or a problem with your immune system  Bacteria, a virus, or a parasite may also cause colitis  The cause may not be known  You may have diarrhea, abdominal pain, fever, or blood or mucus in your bowel movement  DISCHARGE INSTRUCTIONS:   Return to the emergency department if:   · You have sudden trouble breathing  · Your bowel movements are black or have blood in them  · You have blood in your vomit  · You have severe abdominal pain or your abdomen is swollen and feels hard  · You have any of the following signs of dehydration:     ¨ Dizziness or weakness    ¨ Dry mouth, cracked lips, or severe thirst    ¨ Fast heartbeat or breathing    ¨ Urinating very little or not at all  Contact your healthcare provider if:   · Your symptoms get worse or do not go away  · You have a fever, chills, cough, or feel weak and achy  · You suddenly lose weight without trying  · You have questions or concerns about your condition or care  Medicines:   · Medicines  may be given to decrease inflammation in your colon and treat diarrhea  · Take your medicine as directed  Contact your healthcare provider if you think your medicine is not helping or if you have side effects  Tell him of her if you are allergic to any medicine  Keep a list of the medicines, vitamins, and herbs you take  Include the amounts, and when and why you take them  Bring the list or the pill bottles to follow-up visits  Carry your medicine list with you in case of an emergency  Manage your symptoms:   · Drink liquids as directed  to help prevent dehydration  Good liquids to drink include water, juice, and broth  Ask how much liquid to drink each day  You may need to drink an oral rehydration solution (ORS)  An ORS contains a balance of water, salt, and sugar to replace body fluids lost during diarrhea       · Eat a variety of healthy foods  Healthy foods include fruits, vegetables, whole-grain breads, beans, low-fat dairy products, lean meats, and fish  You may need to eat several small meals throughout the day instead of large meals  Avoid spicy foods, caffeine, chocolate, and foods high in fat  · Talk to your healthcare provider before you take NSAIDs  NSAIDs can cause worsen your symptoms if ulcers are causing your colitis  · Start to exercise when you feel better  Regular exercise helps your bowels work normally  Ask about the best exercise plan for you  Follow up with your healthcare provider as directed: You may need to return for a colonoscopy or other tests  Write down how often you have a bowel movements and what they look like  Bring this to your follow-up visits  Write down your questions so you remember to ask them during your visits  © 2017 [address] Information is for End User's use only and may not be sold, redistributed or otherwise used for commercial purposes  All illustrations and images included in CareNotes® are the copyrighted property of A D [employer] M [employer]  or [address]  The above information is an  only  It is not intended as medical advice for individual conditions or treatments  Talk to your doctor, nurse or pharmacist before following any medical regimen to see if it is safe and effective for you  Acute Abdominal Pain   WHAT YOU NEED TO KNOW:   The cause of your abdominal pain may not be found  If a cause is found, treatment will depend on what the cause is  DISCHARGE INSTRUCTIONS:   Seek care immediately if:   · You vomit blood or cannot stop vomiting  · You have blood in your bowel movement or it looks like tar  · You have bleeding from your rectum  · Your abdomen is larger than usual, more painful, and hard  · You have severe pain in your abdomen  · You stop passing gas and having bowel movements       · You feel weak, dizzy, or faint  Contact your healthcare provider if:   · You have a fever  · You have new signs and symptoms  · Your symptoms do not get better with treatment  · You have questions or concerns about your condition or care  Medicines  may be given to decrease pain, treat an infection, and manage your symptoms  Take your medicine as directed  Call your healthcare provider if you think your medicine is not helping or if you have side effects  Tell him if you are allergic to any medicine  Keep a list of the medicines, vitamins, and herbs you take  Include the amounts, and when and why you take them  Bring the list or the pill bottles to follow-up visits  Carry your medicine list with you in case of an emergency  Manage your symptoms:   · Apply heat  on your abdomen for 20 to 30 minutes every 2 hours for as many days as directed  Heat helps decrease pain and muscle spasms  · Manage your stress  Stress may cause abdominal pain  Your healthcare provider may recommend relaxation techniques and deep breathing exercises to help decrease your stress  Your healthcare provider may recommend you talk to someone about your stress or anxiety, such as a counselor or a trusted friend  Get plenty of sleep and exercise regularly  · Limit or do not drink alcohol  Alcohol can make your abdominal pain worse  Ask your healthcare provider if it is safe for you to drink alcohol  Also ask how much is safe for you to drink  · Do not smoke  Nicotine and other chemicals in cigarettes can damage your esophagus and stomach  Ask your healthcare provider for information if you currently smoke and need help to quit  E-cigarettes or smokeless tobacco still contain nicotine  Talk to your healthcare provider before you use these products  Make changes to the food you eat as directed:  Do not eat foods that cause abdominal pain or other symptoms  Eat small meals more often  · Eat more high-fiber foods if you are constipated  High-fiber foods include fruits, vegetables, whole-grain foods, and legumes  · Do not eat foods that cause gas if you have bloating  Examples include broccoli, cabbage, and cauliflower  Do not drink soda or carbonated drinks, because these may also cause gas  · Do not eat foods or drinks that contain sorbitol or fructose if you have diarrhea and bloating  Some examples are fruit juices, candy, jelly, and sugar-free gum  · Do not eat high-fat foods, such as fried foods, cheeseburgers, hot dogs, and desserts  · Limit or do not drink caffeine  Caffeine may make symptoms, such as heart burn or nausea, worse  · Drink plenty of liquids to prevent dehydration from diarrhea or vomiting  Ask your healthcare provider how much liquid to drink each day and which liquids are best for you  Follow up with your healthcare provider as directed:  Write down your questions so you remember to ask them during your visits  © 2017 [address] Information is for End User's use only and may not be sold, redistributed or otherwise used for commercial purposes  All illustrations and images included in CareNotes® are the copyrighted property of [employer]  or Erlin Álvarez  The above information is an  only  It is not intended as medical advice for individual conditions or treatments  Talk to your doctor, nurse or pharmacist before following any medical regimen to see if it is safe and effective for you

## 2017-11-29 NOTE — ED CARE HANDOFF
Emergency Department Sign Out Note        Sign out and transfer of care from Dr Lis Alvarez See Separate Emergency Department note  The patient, Ygnacio Severe, was evaluated by the previous provider for lower abd pain  Workup Completed:  Rectal bleeding on and off for last month - colonoscopy scheduled for tomorrow but lower abd pain today with associated nausea  NO fever or vomiting   + diarrhea which was bloody and now watery  Labs all WNL    ED Course / Workup Pending (followup): Awaiting CT a/p results      0030 - CT a/p shows 1  5 cm left adnexal cyst   2  Mild diffuse colonic wall thickening  No significant inflammatory stranding identified  The appearance is compatible with a mild acute infectious/inflammatory colitis  Pt has colonoscopy next tuesday  Will d/c home on cipro/flagyl (reports subjective fevers and WBC 11) bentyl prn and f/u with GI as planned  Will give IV toradol and bentyl here and discharge  Pt fine with plan  ED Course      Procedures  MDM  CritCare Time      Disposition  Final diagnoses:   Lower abdominal pain   Colitis   History of bloody stools     Time reflects when diagnosis was documented in both MDM as applicable and the Disposition within this note     Time User Action Codes Description Comment    11/29/2017 12:29 AM Marissa JUSTIN Add [R10 30] Lower abdominal pain     11/29/2017 12:29 AM Marissa JUSTIN Add [K52 9] Colitis     11/29/2017 12:30 AM Leatha Rosas Add [Z87 19] History of bloody stools       ED Disposition     ED Disposition Condition Comment    Discharge  Ygnacio Severe discharge to home/self care      Condition at discharge: Good        Follow-up Information     Follow up With Specialties Details Why Contact Info    GI next tuesday as planned for colonscopy            Discharge Medication List as of 11/29/2017 12:36 AM      START taking these medications    Details   ciprofloxacin (CIPRO) 500 mg tablet Take 1 tablet by mouth 2 (two) times a day for 7 days, Starting Wed 11/29/2017, Until Wed 12/6/2017, Print      dicyclomine (BENTYL) 20 mg tablet Take 1 tablet by mouth every 6 (six) hours as needed (abd cramping) for up to 5 days, Starting Wed 11/29/2017, Until Mon 12/4/2017, Print      metroNIDAZOLE (FLAGYL) 500 mg tablet Take 1 tablet by mouth every 8 (eight) hours for 7 days, Starting Wed 11/29/2017, Until Wed 12/6/2017, Print         CONTINUE these medications which have NOT CHANGED    Details   nortriptyline (PAMELOR) 25 mg capsule Take by mouth, Starting Wed 11/15/2017, Historical Med      Hyoscyamine-Phenyltoloxamine 0 6171-58 MG CAPS Take 1 tablet by mouth 3 (three) times a day before meals Taking only prn, Historical Med      OMEPRAZOLE PO Take 20 mg by mouth, Historical Med           No discharge procedures on file         ED Provider  Electronically Signed by

## 2017-11-29 NOTE — ED PROVIDER NOTES
History  Chief Complaint   Patient presents with    Abdominal Pain     patient reports abdominal pain, bright red blood in the stools, nausea, and diarrhea  patient was at the gastroenterologist today and was instructed to come to the ED if her pain had worsened  patient has an apt for a colonoscopy  45-year-old female with a history of irritable bowel syndrome and GERD presents to the emergency department with a 1 day history of lower abdominal pain and diarrhea  Initially she noticed blood in her stool  Now she states the diarrhea is just watery  She has had nausea without vomiting  No fevers or chills  She has been having blood in her stools over the last month with her bowel movements and has a colonoscopy scheduled tomorrow, however she started with the pain today which has gradually become worse          History provided by:  Patient   used: No    Abdominal Pain   Pain location:  LLQ, RLQ and suprapubic  Pain quality: cramping    Pain radiates to:  Does not radiate  Pain severity:  Moderate  Onset quality:  Gradual  Duration:  1 day  Timing:  Constant  Progression:  Worsening  Chronicity:  New  Context: not alcohol use, not eating, not laxative use, not previous surgeries, not recent illness, not recent travel, not suspicious food intake and not trauma    Relieved by:  None tried  Worsened by:  Nothing  Ineffective treatments:  Antacids  Associated symptoms: anorexia, diarrhea, hematochezia and nausea    Associated symptoms: no belching, no chest pain, no chills, no constipation, no cough, no dysuria, no fatigue, no fever, no flatus, no hematemesis, no hematuria, no melena, no shortness of breath, no sore throat, no vaginal bleeding, no vaginal discharge and no vomiting    Diarrhea:     Quality:  Watery and blood-tinged    Severity:  Moderate    Duration:  1 day    Timing:  Intermittent    Progression:  Unchanged  Risk factors: obesity    Risk factors: no alcohol abuse, no aspirin use, has not had multiple surgeries, no NSAID use, not pregnant and no recent hospitalization        Prior to Admission Medications   Prescriptions Last Dose Informant Patient Reported? Taking? Hyoscyamine-Phenyltoloxamine 2 0236-23 MG CAPS   Yes No   Sig: Take 1 tablet by mouth 3 (three) times a day before meals Taking only prn      Facility-Administered Medications: None       Past Medical History:   Diagnosis Date    GERD (gastroesophageal reflux disease)     IBS (irritable bowel syndrome)        Past Surgical History:   Procedure Laterality Date    WRIST SURGERY         History reviewed  No pertinent family history  I have reviewed and agree with the history as documented  Social History   Substance Use Topics    Smoking status: Never Smoker    Smokeless tobacco: Never Used    Alcohol use Yes      Comment: socially         Review of Systems   Constitutional: Negative  Negative for chills, diaphoresis, fatigue and fever  HENT: Negative  Negative for congestion, rhinorrhea and sore throat  Eyes: Negative  Negative for discharge, redness and itching  Respiratory: Negative  Negative for apnea, cough, chest tightness, shortness of breath and wheezing  Cardiovascular: Negative for chest pain, palpitations and leg swelling  Gastrointestinal: Positive for abdominal pain, anorexia, blood in stool, diarrhea, hematochezia and nausea  Negative for abdominal distention, constipation, flatus, hematemesis, melena, rectal pain and vomiting  Endocrine: Negative  Genitourinary: Negative  Negative for dysuria, flank pain, frequency, hematuria, urgency, vaginal bleeding and vaginal discharge  Musculoskeletal: Negative  Negative for back pain  Skin: Negative  Allergic/Immunologic: Negative  Neurological: Negative  Negative for dizziness, syncope, weakness, light-headedness, numbness and headaches  Hematological: Negative  All other systems reviewed and are negative        Physical Exam  ED Triage Vitals   Temperature Pulse Respirations Blood Pressure SpO2   11/28/17 2127 11/28/17 2125 11/28/17 2125 11/28/17 2125 11/28/17 2125   98 4 °F (36 9 °C) (!) 118 18 145/81 99 %      Temp Source Heart Rate Source Patient Position - Orthostatic VS BP Location FiO2 (%)   11/28/17 2127 11/28/17 2125 11/28/17 2125 11/28/17 2125 --   Oral Monitor Sitting Right arm       Pain Score       11/28/17 2125       8           Orthostatic Vital Signs  Vitals:    11/28/17 2125   BP: 145/81   Pulse: (!) 118   Patient Position - Orthostatic VS: Sitting       Physical Exam   Constitutional: She is oriented to person, place, and time  She appears well-developed and well-nourished  Non-toxic appearance  She does not have a sickly appearance  She does not appear ill  She appears distressed (Tearful)  HENT:   Head: Normocephalic and atraumatic  Right Ear: External ear normal    Left Ear: External ear normal    Mouth/Throat: Oropharynx is clear and moist    Eyes: Conjunctivae are normal  Pupils are equal, round, and reactive to light  Right eye exhibits no discharge  Left eye exhibits no discharge  No scleral icterus  Cardiovascular: Regular rhythm and normal heart sounds  Tachycardia present  Exam reveals no gallop and no friction rub  No murmur heard  Pulmonary/Chest: Effort normal and breath sounds normal  No respiratory distress  She has no wheezes  She has no rales  She exhibits no tenderness  Abdominal: Soft  Normal appearance and bowel sounds are normal  She exhibits no distension and no mass  There is tenderness in the right lower quadrant, suprapubic area and left lower quadrant  There is no rebound, no guarding and no tenderness at McBurney's point  No hernia  Musculoskeletal: She exhibits no tenderness  Neurological: She is alert and oriented to person, place, and time  She has normal reflexes  She exhibits normal muscle tone  Skin: Skin is warm and dry  No rash noted  She is not diaphoretic  No erythema  No pallor  Psychiatric: She has a normal mood and affect  Nursing note and vitals reviewed  ED Medications  Medications   sodium chloride 0 9 % bolus 1,000 mL (not administered)   morphine (PF) 4 mg/mL injection 4 mg (not administered)   ondansetron (ZOFRAN) injection 4 mg (not administered)       Diagnostic Studies  Results Reviewed     Procedure Component Value Units Date/Time    CBC and differential [[id number]]     Lab Status:  No result Specimen:  Blood     Comprehensive metabolic panel [[id number]]     Lab Status:  No result Specimen:  Blood     Lipase [[id number]]     Lab Status:  No result Specimen:  Blood     POCT pregnancy, urine [[id number]]     Lab Status:  No result     POCT urinalysis dipstick [[id number]]     Lab Status:  No result Specimen:  Urine                  CT abdomen pelvis with contrast    (Results Pending)              Procedures  Procedures       Phone Contacts  ED Phone Contact    ED Course  ED Course                                MDM  Number of Diagnoses or Management Options  Diagnosis management comments: 26-year-old female presents to the emergency department with a 1 day history of lower abdominal pain which she describes as crampy  Initially today she had bloody diarrhea that has now progressed to watery diarrhea  She has been having blood in her stools over the last month and is scheduled for colonoscopy tomorrow  On exam she is awake and alert and seems to be in some distress secondary to the pain  She is tender across the lower abdomen but there is no peritoneal signs  Patient is in the [place] so a rectal exam was not done  Given her new pain will check blood work, will CT abdomen pelvis to rule out diverticulitis/colitis/appendicitis although this is less likely  Will give pain, nausea meds, IV fluids and reassess         Amount and/or Complexity of Data Reviewed  Clinical lab tests: ordered and reviewed  Tests in the radiology section of CPT®: ordered and reviewed  Independent visualization of images, tracings, or specimens: yes      CritCare Time    Disposition  Final diagnoses:   None     ED Disposition     None      Follow-up Information    None       Patient's Medications   Discharge Prescriptions    No medications on file     No discharge procedures on file      ED Provider  Electronically Signed by           Lamin Myles DO  11/29/17 0800

## 2017-11-30 NOTE — PROGRESS NOTES
Assessment    1  Acute colitis (558 9) (K52 9)   2  Blood in stool (578 1) (K92 1)    Plan  Acute colitis    · Continue with our present treatment plan ; Status:Complete;   Done: 29Nov201705:16PM  Blood in stool    · (1) COMPREHENSIVE METABOLIC PANEL; Status:Active; Requested for:29Nov2017;   PMH: Acute diarrhea    · (1) CBC/PLT/DIFF; Status:Active; Requested for:29Nov2017;     Discussion/Summary    Differential diagnosis of inflammatory versus infectious was discussed with patient and her mother today  [place] diet continue with antibiotic lab studies ordered  I will see her for close follow-up on Friday  GI consultants will be updated  The patient was counseled regarding instructions for management,-- risk factor reductions,-- patient and family education,-- impressions,-- importance of compliance with treatment  Chief Complaint  1  Abdominal Pain   2  Rectal Pain   3  Vomiting    History of Present Illness  HPI: Patient is here to follow-up from yesterday's ER visit when her abdominal pain got really severe and she had a few more bout of bleeding per rectum  A CT scan was ordered that showed pancolitis  She was started on Cipro and Flagyl  She has taken a dose yesterday but did not take any medications this morning  She has been having some chills but denies any fever this morning  She feels slightly lightheaded  She had episode of vomiting and has been feeling nauseous but is able to keep liquids down  She did have 1 bowel movement this morning that had some blood in it  Active Problems  1  Anxiety, generalized (300 02) (F41 1)   2  Blood in stool (578 1) (K92 1)   3  Candidiasis of skin (112 3) (B37 2)   4  Chronic diarrhea (787 91) (K52 9)   5  Chronic fatigue (780 79) (R53 82)   6  Community acquired bacterial pneumonia (482 9) (J15 9)   7  Depression with anxiety (300 4) (F41 8)   8  Dyspepsia (536 8) (R10 13)   9  Herpes labialis (054 9) (B00 1)   10   Irritable bowel syndrome with diarrhea (564 1) (K58 0)   11  Left ovarian cyst (620 2) (N83 202)   12  Sleep pattern disturbance (780 50) (G47 20)   13  Strain of thoracic region, initial encounter (847 1) (S29 019A)   14  Visit for routine gyn exam (V72 31) (Z01 419)    Past Medical History  Active Problems And Past Medical History Reviewed: The active problems and past medical history were reviewed and updated today  Social History     · Never a smoker   · No alcohol use   · No drug use  The social history was reviewed and updated today  Family History  Family History Reviewed: The family history was reviewed and updated today  Current Meds   1  Ciprofloxacin HCl TABS; Therapy: (Recorded:29Nov2017) to Recorded   2  Flagyl CAPS (MetroNIDAZOLE); Therapy: (Recorded:29Nov2017) to Recorded   3  Hyoscyamine TABS; Therapy: ([place]) to Recorded   4  Nortriptyline HCl - 25 MG Oral Capsule; TAKE 1 CAPSULE AT BEDTIME; Therapy: 74LHY3531 to (Evaluate:05Lxl8935)  Requested for: 31IBT8579; Last Rx:01Nov2017 Ordered   5  Omeprazole 20 MG Oral Capsule Delayed Release; TAKE ONE CAPSULE BY MOUTH EVERY DAY  Requested for: 93Xiw5068; Last Rx:66Xof3319 Ordered    The medication list was reviewed and updated today  Allergies  1  No Known Drug Allergies  2  Animal dander   3  Dust   4  Mold   5  Seasonal    Vitals   Recorded: 29Nov2017 10:34AM Recorded: 00AXO6798 10:32AM   Temperature  98 F   Heart Rate  881   Systolic 755    Diastolic 78    Height  5 ft 2 in   O2 Saturation  97       Physical Exam   Ears, Nose, Mouth, and Throat no palor  -- MM moist   Abdomen  Abdomen: Abnormal  -- Obese tender left side but no guarding or rigidity  Psychiatric  Orientation to person, place, and time: Normal    Mood and affect: Normal          Future Appointments    Date/Time Provider Specialty Site   12/04/2017 04:00 PM NHAN Kapoor   Psychiatry [facility]   12/05/2017 12:00 PM Hardeep Sharma MD Gastroenterology Adult [facility]       Signatures   Electronically signed by : NHAN Soriano ; Nov 29 2017  5:19PM EST                       (Author)

## 2017-12-01 ENCOUNTER — APPOINTMENT (OUTPATIENT)
Dept: LAB | Facility: CLINIC | Age: 27
End: 2017-12-01
Payer: COMMERCIAL

## 2017-12-01 ENCOUNTER — TRANSCRIBE ORDERS (OUTPATIENT)
Dept: LAB | Facility: CLINIC | Age: 27
End: 2017-12-01

## 2017-12-01 DIAGNOSIS — K92.1 MELENA: ICD-10-CM

## 2017-12-01 PROCEDURE — 83631 LACTOFERRIN FECAL (QUANT): CPT

## 2017-12-05 ENCOUNTER — GENERIC CONVERSION - ENCOUNTER (OUTPATIENT)
Dept: GASTROENTEROLOGY | Facility: CLINIC | Age: 27
End: 2017-12-05

## 2017-12-05 ENCOUNTER — GENERIC CONVERSION - ENCOUNTER (OUTPATIENT)
Dept: OTHER | Facility: OTHER | Age: 27
End: 2017-12-05

## 2017-12-05 ENCOUNTER — ANESTHESIA EVENT (OUTPATIENT)
Dept: GASTROENTEROLOGY | Facility: HOSPITAL | Age: 27
End: 2017-12-05
Payer: COMMERCIAL

## 2017-12-05 ENCOUNTER — HOSPITAL ENCOUNTER (OUTPATIENT)
Facility: HOSPITAL | Age: 27
Setting detail: OUTPATIENT SURGERY
Discharge: HOME | End: 2017-12-05
Attending: INTERNAL MEDICINE | Admitting: INTERNAL MEDICINE
Payer: COMMERCIAL

## 2017-12-05 ENCOUNTER — ANESTHESIA (OUTPATIENT)
Dept: GASTROENTEROLOGY | Facility: HOSPITAL | Age: 27
End: 2017-12-05
Payer: COMMERCIAL

## 2017-12-05 VITALS
WEIGHT: 229 LBS | TEMPERATURE: 97.5 F | RESPIRATION RATE: 16 BRPM | HEART RATE: 86 BPM | HEIGHT: 62 IN | SYSTOLIC BLOOD PRESSURE: 116 MMHG | BODY MASS INDEX: 42.14 KG/M2 | DIASTOLIC BLOOD PRESSURE: 75 MMHG | OXYGEN SATURATION: 99 %

## 2017-12-05 DIAGNOSIS — K92.1 BLOOD IN STOOL: ICD-10-CM

## 2017-12-05 LAB — EXT PREGNANCY TEST URINE: NEGATIVE

## 2017-12-05 PROCEDURE — 88305 TISSUE EXAM BY PATHOLOGIST: CPT | Performed by: INTERNAL MEDICINE

## 2017-12-05 PROCEDURE — 88342 IMHCHEM/IMCYTCHM 1ST ANTB: CPT | Performed by: INTERNAL MEDICINE

## 2017-12-05 RX ORDER — SODIUM CHLORIDE 9 MG/ML
50 INJECTION, SOLUTION INTRAVENOUS CONTINUOUS
Status: DISCONTINUED | OUTPATIENT
Start: 2017-12-05 | End: 2017-12-05 | Stop reason: HOSPADM

## 2017-12-05 RX ORDER — LIDOCAINE HYDROCHLORIDE 10 MG/ML
INJECTION, SOLUTION INFILTRATION; PERINEURAL AS NEEDED
Status: DISCONTINUED | OUTPATIENT
Start: 2017-12-05 | End: 2017-12-05 | Stop reason: SURG

## 2017-12-05 RX ORDER — PROPOFOL 10 MG/ML
INJECTION, EMULSION INTRAVENOUS AS NEEDED
Status: DISCONTINUED | OUTPATIENT
Start: 2017-12-05 | End: 2017-12-05 | Stop reason: SURG

## 2017-12-05 RX ORDER — CITALOPRAM 20 MG/1
10 TABLET ORAL DAILY
COMMUNITY
End: 2018-05-23 | Stop reason: SDUPTHER

## 2017-12-05 RX ORDER — PROPOFOL 10 MG/ML
INJECTION, EMULSION INTRAVENOUS CONTINUOUS PRN
Status: DISCONTINUED | OUTPATIENT
Start: 2017-12-05 | End: 2017-12-05 | Stop reason: SURG

## 2017-12-05 RX ADMIN — PROPOFOL 100 MCG/KG/MIN: 10 INJECTION, EMULSION INTRAVENOUS at 11:51

## 2017-12-05 RX ADMIN — SODIUM CHLORIDE 50 ML/HR: 0.9 INJECTION, SOLUTION INTRAVENOUS at 11:27

## 2017-12-05 RX ADMIN — SODIUM CHLORIDE: 0.9 INJECTION, SOLUTION INTRAVENOUS at 11:49

## 2017-12-05 RX ADMIN — SODIUM CHLORIDE: 0.9 INJECTION, SOLUTION INTRAVENOUS at 11:30

## 2017-12-05 RX ADMIN — PROPOFOL 100 MCG/KG/MIN: 10 INJECTION, EMULSION INTRAVENOUS at 11:42

## 2017-12-05 RX ADMIN — LIDOCAINE HYDROCHLORIDE 50 MG: 10 INJECTION, SOLUTION INFILTRATION; PERINEURAL at 11:42

## 2017-12-05 RX ADMIN — PROPOFOL 100 MCG/KG/MIN: 10 INJECTION, EMULSION INTRAVENOUS at 11:48

## 2017-12-05 RX ADMIN — PROPOFOL 200 MG: 10 INJECTION, EMULSION INTRAVENOUS at 11:42

## 2017-12-05 NOTE — ANESTHESIA PREPROCEDURE EVALUATION
Review of Systems/Medical History  Patient summary reviewed  Chart reviewed      Cardiovascular  Negative cardio ROS    Pulmonary  Negative pulmonary ROS ,        GI/Hepatic    GERD poorly controlled,        Negative  ROS        Endo/Other  Negative endo/other ROS      GYN  Negative gynecology ROS          Hematology  Negative hematology ROS      Musculoskeletal  Negative musculoskeletal ROS No obesity ,        Neurology  Negative neurology ROS      Psychology   Anxiety, Depression ,            Physical Exam    Airway    Mallampati score: II  TM Distance: >3 FB  Neck ROM: full     Dental   No notable dental hx     Cardiovascular  Comment: Negative ROS, Rhythm: regular, Rate: normal,     Pulmonary  Pulmonary exam normal Breath sounds clear to auscultation,     Other Findings        Anesthesia Plan  ASA Score- 2       Anesthesia Type- IV sedation with anesthesia with ASA Monitors  Additional Monitors:   Airway Plan:           Induction-       Informed Consent- Anesthetic plan and risks discussed with patient

## 2017-12-05 NOTE — OP NOTE
**** GI/ENDOSCOPY REPORT ****     PATIENT NAME: Gi Weiss ------ VISIT ID:  Patient ID:   [id number] YOB: 1990     INTRODUCTION: Colonoscopy - A 32 female patient presents for an outpatient   Colonoscopy at [address] [place] [address]  PREVIOUS COLONOSCOPY: Patient's last colonoscopy was < 3 years ago  INDICATIONS: Change in bowel habits  CONSENT:  The benefits, risks, and alternatives to the procedure were   discussed and informed consent was obtained from the patient  PREPARATION: EKG, pulse, pulse oximetry and blood pressure were monitored   throughout the procedure  The patient was identified by myself both   verbally and by visual inspection of ID band  Airway Assessment   Classification: Airway class 2 - Visualization of the soft palate, fauces   and uvula  ASA Classification: Class 2 - Patient has mild to moderate   systemic disturbance that may or may not be related to the disorder   requiring surgery  MEDICATIONS: Anesthesia-check records     PROCEDURE:  The endoscope was passed without difficulty through the anus   under direct visualization and advanced to the terminal ileum  The scope   was withdrawn and the mucosa was carefully examined  The quality of the   preparation was excellent  Cecal Intubation Time: 3 minutes(s) Scope   Withdrawal Time: 11 minutes(s)     RECTAL EXAM: Normal rectal exam      FINDINGS:  The terminal ileum, cecum, ascending colon, hepatic flexure,   transverse colon, splenic flexure, descending colon, and rectum appeared   to be normal  Multiple random biopsies was taken  There was evidence of   mild colitis in the sigmoid colon  Multiple biopsies was taken  COMPLICATIONS: There were no complications  IMPRESSIONS: Normal terminal ileum, cecum, ascending colon, hepatic   flexure, transverse colon, splenic flexure, descending colon, and rectum  Multiple biopsies taken  Mild colitis found in the sigmoid colon  Multiple   biopsies taken  RECOMMENDATIONS: Resume regular diet as tolerated  Avoid all non-steroidal   anti-inflammatory drugs (NSAID's) including but not limited to Ibuprofen,   Advil, Motrin, and Nuprin  Follow-up on the results of the biopsy   specimens  Follow up bx  Follow up with Dr Brian Hein  ESTIMATED BLOOD LOSS:     PATHOLOGY SPECIMENS: Multiple random biopsies taken  Multiple biopsies   taken  Associated finding: Colitis  PROCEDURE CODES: Colonoscopy with biopsy     ICD-9 Codes: 787 99 Other symptoms involving digestive system 558 9 Other   and unspecified noninfectious gastroenteritides and colitis     ICD-10 Codes: R19 4 Change in bowel habit K52 9 Noninfective   gastroenteritis and colitis, unspecified     PERFORMED BY: NHAN De La Cruz  on 12/05/2017  Version 1, electronically signed by NHAN Chisholm  on 12/05/2017 at   12:31

## 2017-12-05 NOTE — ANESTHESIA POSTPROCEDURE EVALUATION
Post-Op Assessment Note      CV Status:  Stable    Mental Status:  Alert    Hydration Status:  Stable    PONV Controlled:  None    Airway Patency:  Patent    Post Op Vitals Reviewed: Yes          Staff: CRNA           BP   118/61   Temp      Pulse  90   Resp 24   SpO2   99

## 2017-12-05 NOTE — OP NOTE
**** GI/ENDOSCOPY REPORT ****     PATIENT NAME: Marcelina Carlson - VISIT ID:  Patient ID: [id number]   YOB: 1990     INTRODUCTION: Esophagogastroduodenoscopy - A 32 female patient presents   for an outpatient Esophagogastroduodenoscopy at [address] [place] [address]  INDICATIONS: Abdominal pain  CONSENT: The benefits, risks, and alternatives to the procedure were   discussed and informed consent was obtained from the patient  PREPARATION:  EKG, pulse, pulse oximetry and blood pressure were monitored   throughout the procedure  MEDICATIONS: Anesthesia administered by anesthesiologist      PROCEDURE:  The endoscope was passed without difficulty through the mouth   under direct visualization and advanced to the 2nd portion of the   duodenum  The scope was withdrawn and the mucosa was carefully examined  FINDINGS:   Esophagus: LA Class C esophagitis was found in the esophagus  There was a 2 cm hiatus hernia visible in the esophagus  Stomach: The   stomach appeared to be normal  Multiple random biopsies was taken from the   antrum and body of the stomach  Pylorus: The pylorus appeared to be   normal, symmetrical, and patent  Duodenum: The duodenal bulb and 2nd   portion of the duodenum appeared to be normal  Multiple random biopsies   was taken from the duodenal bulb and 2nd portion of the duodenum  COMPLICATIONS: There were no complications  IMPRESSIONS: Esophagitis seen  A hiatus hernia found  Normal stomach  Multiple biopsies taken  Normal, symmetrical, and patent pylorus  Normal   duodenal bulb and 2nd portion of the duodenum  RECOMMENDATIONS: Anti-reflux measures: Raise the head of the bed 4 to 6   inches  Avoid smoking  Avoid excess coffee, tea or other caffeinated   beverages  Avoid garments that fit tightly through the abdomen  Avoid   eating before bed  Continue taking the following medications as   prescribed: Omeprazole (Prilosec)   EGD recommended in a 3-4 month  Follow-up on the results of the biopsy specimens  ESTIMATED BLOOD LOSS:     PATHOLOGY SPECIMENS: Multiple random biopsies taken from the antrum and   body of the stomach  Multiple random biopsies taken from the duodenal bulb   and 2nd portion of the duodenum  PROCEDURE CODES: 22298 - EGD flexible; with biopsy     ICD-9 Codes: 789 00 Abdominal pain, unspecified site 530 10 Esophagitis,   unspecified 553 3 Diaphragmatic hernia without mention of obstruction or   gangrene     ICD-10 Codes: R10 Abdominal and pelvic pain K20 9 Esophagitis, unspecified   K44 Diaphragmatic hernia     PERFORMED BY: NHAN Brian  on 12/05/2017  Version 1, electronically signed by NHAN Hdez  on 12/05/2017 at   11:54

## 2017-12-07 LAB — LACTOFERRIN SER-MCNC: <1 UG/ML(G) (ref 0–7.24)

## 2017-12-08 ENCOUNTER — GENERIC CONVERSION - ENCOUNTER (OUTPATIENT)
Dept: OTHER | Facility: OTHER | Age: 27
End: 2017-12-08

## 2017-12-11 ENCOUNTER — GENERIC CONVERSION - ENCOUNTER (OUTPATIENT)
Dept: OTHER | Facility: OTHER | Age: 27
End: 2017-12-11

## 2017-12-12 ENCOUNTER — GENERIC CONVERSION - ENCOUNTER (OUTPATIENT)
Dept: OTHER | Facility: OTHER | Age: 27
End: 2017-12-12

## 2017-12-13 ENCOUNTER — TRANSCRIBE ORDERS (OUTPATIENT)
Dept: LAB | Facility: CLINIC | Age: 27
End: 2017-12-13

## 2017-12-13 ENCOUNTER — APPOINTMENT (OUTPATIENT)
Dept: LAB | Facility: CLINIC | Age: 27
End: 2017-12-13
Payer: COMMERCIAL

## 2017-12-13 ENCOUNTER — ALLSCRIPTS OFFICE VISIT (OUTPATIENT)
Dept: OTHER | Facility: OTHER | Age: 27
End: 2017-12-13

## 2017-12-13 DIAGNOSIS — K52.9 NONINFECTIVE GASTROENTERITIS AND COLITIS: ICD-10-CM

## 2017-12-13 PROCEDURE — 87493 C DIFF AMPLIFIED PROBE: CPT

## 2017-12-13 PROCEDURE — 82784 ASSAY IGA/IGD/IGG/IGM EACH: CPT

## 2017-12-13 PROCEDURE — 83516 IMMUNOASSAY NONANTIBODY: CPT

## 2017-12-13 PROCEDURE — 86255 FLUORESCENT ANTIBODY SCREEN: CPT

## 2017-12-13 PROCEDURE — 36415 COLL VENOUS BLD VENIPUNCTURE: CPT

## 2017-12-14 ENCOUNTER — GENERIC CONVERSION - ENCOUNTER (OUTPATIENT)
Dept: OTHER | Facility: OTHER | Age: 27
End: 2017-12-14

## 2017-12-14 LAB
C DIFF TOX GENS STL QL NAA+PROBE: NORMAL
ENDOMYSIUM IGA SER QL: NEGATIVE
GLIADIN PEPTIDE IGA SER-ACNC: 5 UNITS (ref 0–19)
GLIADIN PEPTIDE IGG SER-ACNC: 4 UNITS (ref 0–19)
IGA SERPL-MCNC: 136 MG/DL (ref 87–352)
TTG IGA SER-ACNC: <2 U/ML (ref 0–3)
TTG IGG SER-ACNC: <2 U/ML (ref 0–5)

## 2017-12-15 ENCOUNTER — APPOINTMENT (OUTPATIENT)
Dept: LAB | Facility: CLINIC | Age: 27
End: 2017-12-15
Payer: COMMERCIAL

## 2017-12-15 ENCOUNTER — TRANSCRIBE ORDERS (OUTPATIENT)
Dept: LAB | Facility: CLINIC | Age: 27
End: 2017-12-15

## 2017-12-15 DIAGNOSIS — K52.9 NONINFECTIVE GASTROENTERITIS AND COLITIS: ICD-10-CM

## 2017-12-15 PROCEDURE — 87177 OVA AND PARASITES SMEARS: CPT

## 2017-12-15 PROCEDURE — 87209 SMEAR COMPLEX STAIN: CPT

## 2017-12-15 PROCEDURE — 87329 GIARDIA AG IA: CPT

## 2017-12-15 NOTE — PROGRESS NOTES
Assessment  1  Nausea and vomiting (787 01) (R11 2)   2  Chronic diarrhea (787 91) (K52 9)   3  Irritable bowel syndrome with diarrhea (564 1) (K58 0)   4  Chronic GERD (530 81) (K21 9)    Plan  Chronic diarrhea    · (1) GIARDIA LAMBLIA; Status:Active; Requested for:57Qax2866;    Perform:[facility]; Due:55Pac9856; Ordered; For:Chronic diarrhea; Ordered By:Lisa Byrd;   · (1) OVA AND PARASITES EXAM; Status:Active; Requested for:83Mgf5352;    Perform:[facility]; Due:23Fjr6422; Ordered; For:Chronic diarrhea; Ordered By:Lisa Byrd;   · (1) STOOL ENTERIC BACTERIAL PATHOGENS PANEL BY PCR; Status:Active; Requested for:21Nss7602;    Perform:[facility]; Due:88Wbq2733; Ordered; For:Chronic diarrhea; Ordered By:Lisa Byrd;   · Follow-up visit in 1 month Evaluation and Treatment  Follow-up  Status: Hold For -Scheduling  Requested for: 82NUL3856   Ordered; For: Chronic diarrhea; Ordered By: Jessica Gonzalez Performed:  Due: 53DIT8233  Nausea and vomiting    · Ondansetron 8 MG Oral Tablet Disintegrating; TAKE 1 TABLET BY MOUTH EVERY 8HOURS AS NEEDED FOR NAUSEA/VOMITING   Rx By: Jessica Gonzalez; Dispense: 30 Days ; #:60 Tablet Disintegrating; Refill: 1;For: Nausea and vomiting; JOHN = N; Verified Transmission to [facility][id number] Last Updated By: System, [employer]; 12/13/2017 1:37:59 PM    Discussion/Summary  Discussion Summary:   49-year-old female with irritable bowel syndrome presenting with acute on chronic diarrhea, abdominal pain, nausea, and vomiting  Her symptoms have been exacerbated over the past 2 days  We will order C diff toxin, stool culture, ova and parasite, and Giardia to rule out infectious diarrhea  Doubt inflammatory bowel disease as recent EGD colonoscopy unremarkable and stool lactoferrin within normal limits  She can stop the mesalamine and steroids as no evidence of IBD   Check celiac panel to rule out celiac disease as her duodenal to terminal ileum and colon with the exception of mild colitis in the sigmoid colon  Biopsies were negative for chronic colitis suggesting that the inflammation was likely related to bowel prep  she ate a hamburger and [employer] fries for dinner Monday night  She woke Tuesday morning with severe crampy lower abdominal pain, nausea, and non-bloody vomiting  She took Bentyl every 4 hours around the clock yesterday with no relief of pain  She also reports worsening heartburn, regurgitation, and acidic taste in her mouth  She takes omeprazole 20 mg daily for acid reflux  She has diarrhea at baseline however this has significantly increased in frequency  She admits to having nearly 20 episodes of diarrhea yesterday  She states the stool is dark and foul smelling  She denies blood in the stool and mucus  She states these are not her typical IBS symptoms  She is concerned about Giardia as she was recently exposed to someone with Giardia  She also has many animals at home including reptiles  History Reviewed: The history was obtained today from the patient and I agree with the documented history  Review of Systems  Complete-Female GI Adult:  Constitutional: feeling poorly-- and-- feeling tired, but-- no fever-- and-- no chills  Eyes: no eyesight problems  ENT: no sore throat  Cardiovascular: no chest pain  Respiratory: no shortness of breath  Gastrointestinal: diarrhea  Genitourinary: no dysuria  Musculoskeletal: no arthralgias  Integumentary: no rashes  Neurological: no headache  Psychiatric: no sleep disturbances  Hematologic/Lymphatic: no swollen glands in the neck  ROS Reviewed:   ROS reviewed  Active Problems  1  Anxiety, generalized (300 02) (F41 1)   2  Candidiasis of skin (112 3) (B37 2)   3  Chronic diarrhea (787 91) (K52 9)   4  Chronic fatigue (780 79) (R53 82)   5  Community acquired bacterial pneumonia (482 9) (J15 9)   6  Depression with anxiety (300 4) (F41 8)   7   Dyspepsia (536 8) (R10 13)   8  Herpes labialis (054 9) (B00 1)   9  Irritable bowel syndrome with diarrhea (564 1) (K58 0)   10  Left ovarian cyst (620 2) (N83 202)   11  Sleep pattern disturbance (780 50) (G47 20)   12  Strain of thoracic region, initial encounter (847 1) (S29 019A)   13  Visit for routine gyn exam (V72 31) (Z01 419)    Past Medical History  1  History of Acute colitis (558 9) (K52 9)   2  History of Acute diarrhea (787 91) (R19 7)   3  History of Blood in stool (578 1) (K92 1)   4  History of Encounter to establish care with new doctor (V65 8) (Z76 89)   5  History of acute bacterial sinusitis (V12 69) (Z87 09)   6  History of upper respiratory infection (V12 09) (Z87 09)  Active Problems And Past Medical History Reviewed: The active problems and past medical history were reviewed and updated today  Surgical History  1  History of Ankle Surgery   2  History of Wrist Surgery  Surgical History Reviewed: The surgical history was reviewed and updated today  Family History  Mother    1  Denied: Family history of Colon cancer   2  Denied: Family history of colonic polyps   3  Denied: Family history of Crohn's disease   4  Denied: Family history of liver disease   5  Family history of Melanoma  Father    6  Denied: Family history of Colon cancer   7  Denied: Family history of colonic polyps   8  Denied: Family history of Crohn's disease   9  Denied: Family history of liver disease  Maternal Grandmother    8  Family history of colitis (V18 59) (Z83 79)  Family History Reviewed: The family history was reviewed and updated today  Social History   · Never a smoker   · No alcohol use   · No drug use  Social History Reviewed: The social history was reviewed and updated today  Current Meds   1  Citalopram Hydrobromide 20 MG Oral Tablet; Therapy: 79SXB0802 to Recorded   2  Hyoscyamine TABS; Therapy: (Recorded:44Yyl2503) to Recorded   3   Omeprazole 20 MG Oral Capsule Delayed Release; TAKE ONE CAPSULE BY MOUTH EVERY DAY  Requested for: 15Rhz7410; Last Rx:35Osf8037 Ordered  Medication List Reviewed: The medication list was reviewed and updated today  Allergies  1  No Known Drug Allergies  2  Animal dander   3  Dust   4  Mold   5  Seasonal    Vitals  Vital Signs    Recorded: 14Dnc1097 01:18PM   Temperature 98 2 F, Tympanic   Heart Rate 021   Systolic 024, LUE, Sitting   Diastolic 80, LUE, Sitting   BP CUFF SIZE Large   Height 5 ft 2 in   Weight 221 lb    BMI Calculated 40 42   BSA Calculated 1 99   O2 Saturation 96, RA     Physical Exam   Constitutional  General appearance: No acute distress, well appearing and well nourished  Eyes No scleral icterus  Ears, Nose, Mouth, and Throat moist mucous membranes  Pulmonary  Respiratory effort: No increased work of breathing or signs of respiratory distress  Auscultation of lungs: Clear to auscultation  Cardiovascular  Auscultation of heart: Normal rate and rhythm, normal S1 and S2, without murmurs  Examination of extremities for edema and/or varicosities: Normal    Abdomen  Abdomen: Abnormal  -- (Nondistended  Normal bowel sounds  Soft  Mild diffuse tenderness to palpation  No rebound or guarding)  Liver and spleen: No hepatomegaly or splenomegaly  Lymphatic  Palpation of lymph nodes in neck: No lymphadenopathy  Musculoskeletal  Gait and station: Normal    Skin  Skin and subcutaneous tissue: Normal without rashes or lesions     Psychiatric  Orientation to person, place, and time: Normal    Mood and affect: Normal          Results/Data  (1) TISSUE EXAM 15SRM6323 11:49AM Perla Tahpa     Test Name Result Flag Reference   LAB AP CASE REPORT (Report)     Surgical Pathology Report             Case: [id number]                  Authorizing Provider: Maryan Jasmine MD      Collected:      12/05/2017 1149       Ordering Location:   [facility]   Received:      12/05/2017 Felicity  67  Endoscopy                              Pathologist: Soraya Valdez MD                                Specimens:  A) - Duodenum, duodenum bx-cold                                   B) - Stomach, gastric bx-cold                                    C) - Small Bowel, NOS, terminal ileum bx-cold                            D) - Colon, right colon random bx-cold                                E) - Large Intestine, Transverse Colon, transverse colon bx-cold                   F) - Large Intestine, Left/Descending Colon, descending colon bx-cold                G) - Large Intestine, Sigmoid Colon, sigmoid colon bx-cold                      H) - Rectum, rectum bx-cold   LAB AP FINAL DIAGNOSIS (Report)     A  Duodenum (biopsy):   - Mild chronic, nonspecific duodenitis with associated partial villous  blunting    - No marked increase in intraepithelial lymphocytes  - No granulomas, dysplasia or neoplasia identified  B  Stomach (biopsy):   - Minimal chronic inactive gastritis involving oxyntic and foveolar  mucosa  - Immunostain for H  pylori (with appropriate positive control) is  negative  - No intestinal metaplasia, dysplasia or neoplasia identified  C  Terminal ileum (biopsy):   - Small bowel mucosa with prominent lymphoid aggregate formation    - No marked increase in intraepithelial lymphocytes  - No granulomas, dysplasia or carcinoma identified  D  Right colon (random biopsy):   - Colonic mucosa with no significant pathologic abnormalities  - No active inflammation or histologic evidence of a microscopic  (lymphocytic)    or collagenous colitis noted  - No granulomas, dysplasia or neoplasia identified  E  Transverse colon (biopsy):   - Colonic mucosa with no significant pathologic abnormalities  - No active inflammation or histologic evidence of a microscopic  (lymphocytic)    or collagenous colitis noted  - No granulomas, dysplasia or neoplasia identified  F  Descending colon (biopsy):   - Colonic mucosa with no significant pathologic abnormalities     - No active inflammation or histologic evidence of a microscopic  (lymphocytic)    or collagenous colitis noted  - No granulomas, dysplasia or neoplasia identified  G  Sigmoid colon (biopsy):   - Colonic mucosa with no significant pathologic abnormalities  - No active inflammation or histologic evidence of a microscopic  (lymphocytic)    or collagenous colitis noted  - No granulomas, dysplasia or neoplasia identified  H  Rectum (biopsy):   - Colonic mucosa with no significant pathologic abnormalities  - No active inflammation or histologic evidence of a microscopic  (lymphocytic)    or collagenous colitis noted  - No granulomas, dysplasia or neoplasia identified  Electronically signed by Wilfredo Escoto MD on 12/8/2017 at 11:51 AM   LAB AP NOTE      Interpretation performed at [facility], [address] [place] [address], [address] [place] [id number]  LAB AP SURGICAL ADDITIONAL INFORMATION (Report)     All controls performed with the immunohistochemical stains reported above  reacted appropriately  These tests were developed and their performance  characteristics determined by [email or web address] or  [facility]  They may not be cleared or approved by the [place]  Food and Drug Administration  The FDA has determined that such clearance  or approval is not necessary  These tests are used for clinical purposes  They should not be regarded as investigational or for research  This  laboratory has been approved by [place] 88, designated as a high-complexity  laboratory and is qualified to perform these tests  LAB AP GROSS DESCRIPTION (Report)     A  The specimen is received in formalin, labeled with the patient's name  and hospital number, and is designated Duodenum biopsy  The specimen  consists of 3 tan soft tissue fragments measuring 0 1, 0 2, and 0 3 cm in  greatest dimension  Entirely submitted in one cassette  B   The specimen is received in formalin, labeled with the patient's name  and hospital number, and is designated Gastric biopsy  The specimen  consists of 3 tan soft tissue fragments measuring 0 1, 0 2, and 0 2 cm in  greatest dimension  Entirely submitted in one cassette  C  The specimen is received in formalin, labeled with the patient's name  and hospital number, and is designated Terminal ileum biopsy  The  specimen consists of 2 tan soft tissue fragments measuring 0 1 and 0 7 cm  in greatest dimension  Entirely submitted in one cassette  D  The specimen is received in formalin, labeled with the patient's name  and hospital number, and is designated Right colon random biopsy  The  specimen consists of multiple tan soft tissue fragments measuring in loose  aggregate 1 0 x 0 4 x 0 1 cm  Entirely submitted in one cassette  E  The specimen is received in formalin, labeled with the patient's name  and hospital number, and is designated  Transverse colon biopsy  The  specimen consists of multiple tan soft tissue fragments measuring in loose  aggregate 0 6 x 0 4 x 0 2 cm  Entirely submitted in one cassette  F  The specimen is received in formalin, labeled with the patient's name  and hospital number, and is designated Descending colon biopsy  The  specimen consists of 3 tan soft tissue fragments measuring 0 1, 0 2, and  0 4 cm in greatest dimension  Entirely submitted in one cassette  G  The specimen is received in formalin, labeled with the patient's name  and hospital number, and is designated Sigmoid colon biopsy  The  specimen consists of multiple tan soft tissue fragments measuring in loose  aggregate 1 5 x 0 4 x 0 2 cm  Entirely submitted in one cassette  H  The specimen is received in formalin, labeled with the patient's name  and hospital number, and is designated Rectum biopsy  The specimen  consists of 2 tan soft tissue fragments each measuring 0 2 cm in greatest  dimension  Entirely submitted in one cassette     Note: The estimated total formalin fixation time based upon information  provided by the submitting clinician and the standard processing schedule  is less than 72 hours  [facility]   LAB AP CLINICAL INFORMATION Blood in stool     R/O Celiac disease     (1) LACTOFERRIN, STOOL [id number] 12:12PM Shoshana Roman    Order Number: [id number]     Test Name Result Flag Reference   LACTOFERRIN <1 00 ug/mL(g)  0 00 - 7 24   **Results verified by repeat testing**                         Baseline (normal)  0 00 - 7 24                         Elevated                 >7 24 An elevated result is indicative of the presence of fecal lactoferrin, a marker of intestinal inflammation  A normal result does not exclude the presence of intestinal inflammation  The test can be used as an in vitro diagnostic aid to distinguish patients with active inflammatory bowel disease (IBD) from those with non-inflammatory irritable bowel syndrome (IBS)  Performed at:  [address]  [id number] : Amador Church MD, Phone:  [phone]     (1) CBC/PLT/DIFF 09TOL4419 12:52PM Shoshana Roman    Order Number: [id number]     Test Name Result Flag Reference   WBC COUNT 13 02 Thousand/uL H 4 31-10 16   RBC COUNT 4 86 Million/uL  3 81-5 12   HEMOGLOBIN 13 4 g/dL  11 5-15 4   HEMATOCRIT 40 5 %  34 8-46  1   MCV 83 fL  82-98   MCH 27 6 pg  26 8-34 3   MCHC 33 1 g/dL  31 4-37 4   RDW 13 7 %  11 6-15 1   MPV 10 2 fL  8 9-12 7   PLATELET COUNT 978 Thousands/uL H 149-390   nRBC AUTOMATED 0 /100 WBCs     NEUTROPHILS RELATIVE PERCENT 84 % H 43-75   LYMPHOCYTES RELATIVE PERCENT 9 % L 14-44   MONOCYTES RELATIVE PERCENT 6 %  4-12   EOSINOPHILS RELATIVE PERCENT 1 %  0-6   BASOPHILS RELATIVE PERCENT 0 %  0-1   NEUTROPHILS ABSOLUTE COUNT 10 84 Thousands/? ??L H 1 85-7 62   LYMPHOCYTES ABSOLUTE COUNT 1 20 Thousands/? ??L  0 60-4 47   MONOCYTES ABSOLUTE COUNT 0 75 Thousand/? ??L  0 17-1 22   EOSINOPHILS ABSOLUTE COUNT 0 16 Thousand/? ??L  0 00-0 61   BASOPHILS ABSOLUTE COUNT 0 02 Thousands/? ??L  0 00-0 10     (1) BASIC METABOLIC PROFILE 82HTH2657 12:52PM Edson Gray    Order Number: [id number]     Test Name Result Flag Reference   GLUCOSE,RANDM 95 mg/dL     If the patient is fasting, the ADA then defines impaired fasting glucose as > 100 mg/dL and diabetes as > or equal to 123 mg/dL  Specimen collection should occur prior to Sulfasalazine administration due to the potential for falsely depressed results  Specimen collection should occur prior to Sulfapyridine administration due to the potential for falsely elevated results  SODIUM 137 mmol/L  136-145   POTASSIUM 3 6 mmol/L  3 5-5 3   CHLORIDE 106 mmol/L  100-108   CARBON DIOXIDE 23 mmol/L  21-32   ANION GAP (CALC) 8 mmol/L  4-13   BLOOD UREA NITROGEN 11 mg/dL  5-25   CREATININE 0 76 mg/dL  0 60-1 30   Standardized to IDMS reference method   CALCIUM 9 0 mg/dL  8 3-10 1   eGFR 108 ml/min/1 73sq [facility] Disease Education Program recommendations are as follows: GFR calculation is accurate only with a steady state creatinine Chronic Kidney disease less than 60 ml/min/1 73 sq  meters Kidney failure less than 15 ml/min/1 73 sq  meters  (1) HEPATIC FUNCTION PANEL [id number] 12:52PM [email or web address] Order Number: [id number]     Test Name Result Flag Reference   ALBUMIN 3 4 g/dL L 3 5-5 0   ALK PHOSPHATAS 85 U/L     ALT (SGPT) 40 U/L  12-78   Specimen collection should occur prior to Sulfasalazine and/or Sulfapyridine administration due to the potential for falsely depressed results  AST(SGOT) 23 U/L  5-45   Specimen collection should occur prior to Sulfasalazine and/or Sulfapyridine administration due to the potential for falsely depressed results     BILI, DIRECT 0 14 mg/dL  0 00-0 20   BILI, TOTAL 0 51 mg/dL  0 20-1 00   TOTAL PROTEIN 7 2 g/dL  6 4-8 2     (1) SED RATE 28Nov2017 12:52PM [email or web address] Order Number: [id number]     Test Name Result Flag Reference   SED RATE 16 mm/hour 0-20     (1) C-REACTIVE PROTEIN [id number] 12:52PM Josh Todd    Order Number: [id number]     Test Name Result Flag Reference   C-REACT PROTEIN 14 2 mg/L H <3 0     (1) CBC/PLT/DIFF [id number] 12:38PM Oliver Atkins Order Number: [id number]     Test Name Result Flag Reference   WBC COUNT 8 96 Thousand/uL  4 31-10 16   RBC COUNT 4 29 Million/uL  3 81-5 12   HEMOGLOBIN 12 0 g/dL  11 5-15 4   HEMATOCRIT 37 3 %  34 8-46  1   MCV 87 fL  82-98   MCH 28 0 pg  26 8-34 3   MCHC 32 2 g/dL  31 4-37 4   RDW 14 1 %  11 6-15 1   MPV 11 0 fL  8 9-12 7   PLATELET COUNT 421 Thousands/uL  149-390   nRBC AUTOMATED 0 /100 WBCs     NEUTROPHILS RELATIVE PERCENT 77 % H 43-75   LYMPHOCYTES RELATIVE PERCENT 12 % L 14-44   MONOCYTES RELATIVE PERCENT 9 %  4-12   EOSINOPHILS RELATIVE PERCENT 2 %  0-6   BASOPHILS RELATIVE PERCENT 0 %  0-1   NEUTROPHILS ABSOLUTE COUNT 6 93 Thousands/? ??L  1 85-7 62   LYMPHOCYTES ABSOLUTE COUNT 1 05 Thousands/? ??L  0 60-4 47   MONOCYTES ABSOLUTE COUNT 0 81 Thousand/? ??L  0 17-1 22   EOSINOPHILS ABSOLUTE COUNT 0 13 Thousand/? ??L  0 00-0 61   BASOPHILS ABSOLUTE COUNT 0 02 Thousands/? ??L  0 00-0 10     (1) COMPREHENSIVE METABOLIC PANEL [id number] 02:62EV Oliver Atkins Order Number: [id number]     Test Name Result Flag Reference   SODIUM 144 mmol/L  136-145   POTASSIUM 3 3 mmol/L L 3 5-5 3   CHLORIDE 105 mmol/L  100-108   CARBON DIOXIDE 30 mmol/L  21-32   ANION GAP (CALC) 9 mmol/L  4-13   BLOOD UREA NITROGEN 6 mg/dL  5-25   CREATININE 1 40 mg/dL H 0 60-1 30   Standardized to IDMS reference method   CALCIUM 8 8 mg/dL  8 3-10 1   BILI, TOTAL 0 38 mg/dL  0 20-1 00   ALK PHOSPHATAS 73 U/L     ALT (SGPT) 29 U/L  12-78   AST(SGOT) 20 U/L  5-45   ALBUMIN 3 1 g/dL L 3 5-5 0   TOTAL PROTEIN 6 8 g/dL  6 4-8 2   eGFR Non-African American 45 5 ml/min/1 73sq m     National Kidney Disease Education Program recommendations are as follows: GFR calculation is accurate only with a steady state creatinine Chronic Kidney disease less than 60 ml/min/1 73 sq  meters Kidney failure less than 15 ml/min/1 73 sq  meters     GLUCOSE FASTING 107 mg/dL H 65-99     Signatures   Electronically signed by : АЛЕКСАНДР Douglass; Dec 13 2017  2:02PM EST                       (Author)    Electronically signed by : Sebastien Domingo MD; Dec 13 2017  2:55PM EST                       (Author)    Electronically signed by : Sebastien Domingo MD; Dec 13 2017  2:56PM EST                       (Author)

## 2017-12-18 ENCOUNTER — GENERIC CONVERSION - ENCOUNTER (OUTPATIENT)
Dept: OTHER | Facility: OTHER | Age: 27
End: 2017-12-18

## 2017-12-18 LAB — G LAMBLIA AG STL QL IA: NEGATIVE

## 2017-12-21 LAB — O+P STL CONC: NORMAL

## 2017-12-22 ENCOUNTER — GENERIC CONVERSION - ENCOUNTER (OUTPATIENT)
Dept: OTHER | Facility: OTHER | Age: 27
End: 2017-12-22

## 2017-12-27 ENCOUNTER — GENERIC CONVERSION - ENCOUNTER (OUTPATIENT)
Dept: OTHER | Facility: OTHER | Age: 27
End: 2017-12-27

## 2018-01-03 ENCOUNTER — HOSPITAL ENCOUNTER (OUTPATIENT)
Dept: SLEEP CENTER | Facility: CLINIC | Age: 28
Discharge: HOME | End: 2018-01-04
Payer: COMMERCIAL

## 2018-01-03 DIAGNOSIS — G47.33 OBSTRUCTIVE SLEEP APNEA SYNDROME: ICD-10-CM

## 2018-01-03 PROCEDURE — 95810 POLYSOM 6/> YRS 4/> PARAM: CPT

## 2018-01-11 NOTE — PSYCH
Behavioral Health Outpatient Intake    Referred By: DR Dave James  Intake Questions: there are no developmental disabilities  the patient does not have a hearing impairment  the patient does not have an ICM or CTT  patient is not taking injectable psychiatric medications  Employment: The patient is employed full time at [employer] [address]  Emergency Contact Information:   Emergency Contact: VIC CARRANZA   Relationship to Patient: MOTHER   Phone Number: [phone]   Previous Psychiatric Treatment: She has previously been seen by a psychiatrist  14 YRS AGO  She has previously been seen by a therapist  14 YRS AGO   History: no  service  She has not had combat service  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

## 2018-01-13 VITALS
SYSTOLIC BLOOD PRESSURE: 104 MMHG | DIASTOLIC BLOOD PRESSURE: 76 MMHG | HEART RATE: 92 BPM | WEIGHT: 200.5 LBS | BODY MASS INDEX: 36.9 KG/M2 | OXYGEN SATURATION: 98 % | HEIGHT: 62 IN

## 2018-01-13 VITALS
HEIGHT: 62 IN | HEART RATE: 108 BPM | SYSTOLIC BLOOD PRESSURE: 104 MMHG | TEMPERATURE: 98 F | DIASTOLIC BLOOD PRESSURE: 78 MMHG | OXYGEN SATURATION: 97 %

## 2018-01-14 VITALS
RESPIRATION RATE: 18 BRPM | SYSTOLIC BLOOD PRESSURE: 120 MMHG | HEART RATE: 84 BPM | WEIGHT: 211.5 LBS | BODY MASS INDEX: 38.92 KG/M2 | OXYGEN SATURATION: 98 % | DIASTOLIC BLOOD PRESSURE: 70 MMHG | HEIGHT: 62 IN

## 2018-01-14 VITALS
WEIGHT: 205.19 LBS | DIASTOLIC BLOOD PRESSURE: 84 MMHG | SYSTOLIC BLOOD PRESSURE: 122 MMHG | HEIGHT: 62 IN | BODY MASS INDEX: 37.76 KG/M2

## 2018-01-15 VITALS
SYSTOLIC BLOOD PRESSURE: 116 MMHG | BODY MASS INDEX: 42.14 KG/M2 | OXYGEN SATURATION: 98 % | WEIGHT: 229 LBS | TEMPERATURE: 97.9 F | HEART RATE: 111 BPM | HEIGHT: 62 IN | DIASTOLIC BLOOD PRESSURE: 74 MMHG

## 2018-01-17 ENCOUNTER — GENERIC CONVERSION - ENCOUNTER (OUTPATIENT)
Dept: OTHER | Facility: OTHER | Age: 28
End: 2018-01-17
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
## 2018-01-18 NOTE — MISCELLANEOUS
Assessment    1  Community acquired bacterial pneumonia (482 9) (J15 9)   2  Acute kidney injury (584 9) (N17 9)   3  Left ovarian cyst (620 2) (N83 202)    Plan  Acute kidney injury    · (1) COMPREHENSIVE METABOLIC PANEL; Status:Complete;   Done: 34LNY5234  12:38PM   Due:08Jun2018; Ordered; For:Acute kidney injury; Ordered By:Vidya Carroll;  Community acquired bacterial pneumonia    · * XR CHEST PA & LATERAL; Status:Active; Requested DRN:[id number];    Perform:[facility]; QRT:53TXJ9515; Ordered; For:Community acquired bacterial pneumonia; Ordered By:Donovan Carroll [facility]; Dyspepsia    · (1) CBC/PLT/DIFF; Status:Complete;   Done: 04BGE5277 12:38PM   TBZ:06EYK2880; Ordered; For:Dyspepsia; Ordered By:Donovan Carroll [facility];    Discussion/Summary  Discussion Summary:   Patient will finish antibiotic and I will do follow-up chest x-ray in 6 weeks  Lab studies to evaluate her acute kidney injury and appropriate adjustment of the antibiotic reports pursued  She is to follow with a gynecologist to evaluate the ovarian cyst    Counseling Documentation With Imm: The patient, patient's family was counseled regarding diagnostic results, instructions for management, risk factor reductions, patient and family education, impressions, importance of compliance with treatment  History of Present Illness  TCM Communication [facility]: The patient is being contacted for follow-up after hospitalization  She was hospitalized at [facility]  The date of admission: 6/5/2017, date of discharge: 6/7/2017  She was discharged to home, Dehydration pneumonia  Medications reviewed and updated today  She did not schedule a follow up appointment  Symptoms: weakness, dizziness, headache, fatigue, cough, shortness of breath, chest pain and anorexia, but no fever  Communication performed and completed by   [facility]: Patient is here to follow-up on recent hospitalization [facility] when she was found to be dehydrated and acute kidney insufficiency  Her creatinine was elevated at 2 43  She received IV fluid  She also underwent pelvic ultrasound because of some down discomfort and was found to have an ovarian cyst  Patient had been having some cough experiencing fatigue nausea vomiting and a chest x-ray was ordered which did show a pneumonia  The chest x-ray is not available at this point  However she was started on Levaquin and advised to follow with the PCP  It appears the kidney functions were back down to 1 5 at the time of discharge  Patient is doing much better but still feels quite tired achy chest discomfort and easily get out of breath on exertion  Review of Systems  Complete-Female:   Constitutional: as noted in HPI    ENT: as noted in HPI  Cardiovascular: as noted in HPI  Respiratory: as noted in HPI  Gastrointestinal: as noted in HPI  Active Problems    1  Candidiasis of skin (112 3) (B37 2)   2  Chronic diarrhea (787 91) (K52 9)   3  Depression with anxiety (300 4) (F41 8)   4  Dyspepsia (536 8) (R10 13)   5  Herpes labialis (054 9) (B00 1)   6  Irritable bowel syndrome with diarrhea (564 1) (K58 0)   7  Strain of thoracic region, initial encounter (847 1) (S29 019A)   8  Visit for routine gyn exam (V72 31) (Z01 419)    Past Medical History    1  History of Encounter to establish care with new doctor (V65 8) (Z71 89)   2  History of upper respiratory infection (V12 09) (Z87 09)    Surgical History    1  History of Ankle Surgery   2  History of Wrist Surgery  Surgical History Reviewed: The surgical history was reviewed and updated today  Family History  Mother    1  Denied: Family history of Colon cancer   2  Denied: Family history of colonic polyps   3  Denied: Family history of Crohn's disease   4  Denied: Family history of liver disease   5  Family history of Melanoma  Father    6  Denied: Family history of Colon cancer   7  Denied: Family history of colonic polyps   8  Denied: Family history of Crohn's disease   9   Denied: Family history of liver disease  Maternal Grandmother    8  Family history of colitis (V18 59) (Z83 79)  Family History Reviewed: The family history was reviewed and updated today  Social History    · Never a smoker   · No alcohol use   · No drug use  Social History Reviewed: The social history was reviewed and updated today  Current Meds   1  Albuterol Sulfate (2 5 MG/3ML) 0 083% Inhalation Nebulization Solution; Therapy: (Recorded:08Jun2017) to Recorded   2  Citalopram Hydrobromide 20 MG Oral Tablet; take 1 tablet by mouth every day    Requested for: 23Bzi7371; Last Rx:58Wzf9218 Ordered   3  Hydrocodone-Acetaminophen 5-300 MG Oral Tablet; Therapy: (Recorded:08Jun2017) to Recorded   4  Hyoscyamine TABS; Therapy: (Fina Roldan) to Recorded   5  LevoFLOXacin 750 MG Oral Tablet; Therapy: (Recorded:08Jun2017) to Recorded   6  Omeprazole 20 MG Oral Capsule Delayed Release; TAKE ONE CAPSULE BY MOUTH   EVERY DAY  Requested for: 26Xaj6723; Last Rx:89Shf7393 Ordered   7  ValACYclovir HCl - 1 GM Oral Tablet; TAKE 2 TABLETS Q12 HRS TIMES 1 DAY; Therapy: 51BDK0020 to (Last Rx:01Jun2017)  Requested for: 01Jun2017 Ordered  Medication List Reviewed: The medication list was reviewed and updated today  Allergies    1  No Known Drug Allergies    2  Animal dander   3  Dust   4  Mold   5  Seasonal    Vitals  Signs   Recorded: 89VHS0270 11:25AM   Heart Rate: 93  Systolic: 897  Diastolic: 68  Height: 5 ft 2 in  Weight: 204 lb 2 oz  BMI Calculated: 37 34  BSA Calculated: 1 93  O2 Saturation: 97    Physical Exam    Constitutional   General appearance: No acute distress, well appearing and well nourished  Eyes   Conjunctiva and lids: No swelling, erythema or discharge  Pulmonary   Respiratory effort: No increased work of breathing or signs of respiratory distress  Auscultation of lungs: Abnormal   Somewhat decreased breath sounds on the right side mild dullness to percussion     Cardiovascular Auscultation of heart: Normal rate and rhythm, normal S1 and S2, without murmurs  Examination of extremities for edema and/or varicosities: Normal     Abdomen   Abdomen: Non-tender, no masses  Musculoskeletal   Gait and station: Normal     Psychiatric   Orientation to person, place, and time: Normal     Mood and affect: Normal          Results/Data  (1) COMPREHENSIVE METABOLIC PANEL 75RNV9975 69:38VB Cayden Jade Order Number: [id number]     Test Name Result Flag Reference   SODIUM 144 mmol/L  136-145   POTASSIUM 3 3 mmol/L L 3 5-5 3   CHLORIDE 105 mmol/L  100-108   CARBON DIOXIDE 30 mmol/L  21-32   ANION GAP (CALC) 9 mmol/L  4-13   BLOOD UREA NITROGEN 6 mg/dL  5-25   CREATININE 1 40 mg/dL H 0 60-1 30   Standardized to IDMS reference method   CALCIUM 8 8 mg/dL  8 3-10 1   BILI, TOTAL 0 38 mg/dL  0 20-1 00   ALK PHOSPHATAS 73 U/L     ALT (SGPT) 29 U/L  12-78   AST(SGOT) 20 U/L  5-45   ALBUMIN 3 1 g/dL L 3 5-5 0   TOTAL PROTEIN 6 8 g/dL  6 4-8 2   eGFR Non-African American 45 5 ml/min/1 73sq m     National Kidney Disease Education Program recommendations are as follows:  GFR calculation is accurate only with a steady state creatinine  Chronic Kidney disease less than 60 ml/min/1 73 sq  meters  Kidney failure less than 15 ml/min/1 73 sq  meters  GLUCOSE FASTING 107 mg/dL H 65-99     (1) CBC/PLT/DIFF 07GHO4365 12:38PM Cayden Jade Order Number: [id number]     Test Name Result Flag Reference   WBC COUNT 8 96 Thousand/uL  4 31-10 16   RBC COUNT 4 29 Million/uL  3 81-5 12   HEMOGLOBIN 12 0 g/dL  11 5-15 4   HEMATOCRIT 37 3 %  34 8-46  1   MCV 87 fL  82-98   MCH 28 0 pg  26 8-34 3   MCHC 32 2 g/dL  31 4-37 4   RDW 14 1 %  11 6-15 1   MPV 11 0 fL  8 9-12 7   PLATELET COUNT 885 Thousands/uL  149-390   nRBC AUTOMATED 0 /100 WBCs     NEUTROPHILS RELATIVE PERCENT 77 % H 43-75   LYMPHOCYTES RELATIVE PERCENT 12 % L 14-44   MONOCYTES RELATIVE PERCENT 9 %  4-12   EOSINOPHILS RELATIVE PERCENT 2 %  0-6   BASOPHILS RELATIVE PERCENT 0 %  0-1   NEUTROPHILS ABSOLUTE COUNT 6 93 Thousands/? ??L  1 85-7 62   LYMPHOCYTES ABSOLUTE COUNT 1 05 Thousands/? ??L  0 60-4 47   MONOCYTES ABSOLUTE COUNT 0 81 Thousand/? ??L  0 17-1 22   EOSINOPHILS ABSOLUTE COUNT 0 13 Thousand/? ??L  0 00-0 61   BASOPHILS ABSOLUTE COUNT 0 02 Thousands/? ??L  0 00-0 10       Future Appointments    Date/Time Provider Specialty Site   06/22/2017 10:00 AM Felicita [facility]   06/23/2017 01:45 PM NHAN Garcia  Obstetrics/Gynecology [facility]   11/01/2017 02:00 PM NHAN Desouza   Psychiatry [facility] 81     Signatures   Electronically signed by : NHAN Nails ; Jun 9 2017  5:14PM EST                       (Author)

## 2018-01-22 VITALS
WEIGHT: 229 LBS | OXYGEN SATURATION: 97 % | TEMPERATURE: 99 F | HEIGHT: 62 IN | HEART RATE: 100 BPM | SYSTOLIC BLOOD PRESSURE: 144 MMHG | DIASTOLIC BLOOD PRESSURE: 91 MMHG | BODY MASS INDEX: 42.14 KG/M2

## 2018-01-22 VITALS
BODY MASS INDEX: 37.56 KG/M2 | HEIGHT: 62 IN | SYSTOLIC BLOOD PRESSURE: 104 MMHG | HEART RATE: 93 BPM | WEIGHT: 204.13 LBS | DIASTOLIC BLOOD PRESSURE: 68 MMHG | OXYGEN SATURATION: 97 %

## 2018-01-22 VITALS
BODY MASS INDEX: 40.67 KG/M2 | TEMPERATURE: 98.2 F | OXYGEN SATURATION: 96 % | SYSTOLIC BLOOD PRESSURE: 116 MMHG | DIASTOLIC BLOOD PRESSURE: 80 MMHG | HEART RATE: 100 BPM | HEIGHT: 62 IN | WEIGHT: 221 LBS

## 2018-01-23 NOTE — RESULT NOTES
Verified Results  (1) C  DIFFICILE TOXIN BY PCR 34Sho7796 11:31AM Ann Nieto    Order Number: [id number]     Test Name Result Flag Reference   C  DIFFICILE TOXIN BY PCR   NEGATIVE for C difficle toxin by PCR  NEGATIVE for C difficle toxin by PCR

## 2018-01-23 NOTE — RESULT NOTES
Discussion/Summary   Celiac disease serologies were negative     Verified Results  (1) CELIAC DISEASE AB PROFILE 49Alt1175 11:31AM [facility] Order Number: [id number]     Test Name Result Flag Reference   tTG IGG <2 U/mL  0 - 5   Negative        0 - 5                                Weak Positive   6 - 9                                Positive           >9   tTG IGA <2 U/mL  0 - 3   Negative        0 -  3                                Weak Positive   4 - 10                                Positive           >10   Tissue Transglutaminase (tTG) has been identified   as the endomysial antigen  Studies have demonstr-   ated that endomysial IgA antibodies have over 99%   specificity for gluten sensitive enteropathy     GLIADA 5 units  0 - 19   Negative                   0 - 19                     Weak Positive             20 - 30                     Moderate to Strong Positive   >30   GLIADG 4 units  0 - 19   Negative                   0 - 19                     Weak Positive             20 - 30                     Moderate to Strong Positive   >30   ENDOMYSIAL AB IGA Negative  Negative   Performed at:  [facility] [address]  [id number]  : Fouzia Mcgill MD, Phone:  [phone]    mg/dL  04 - 131

## 2018-01-23 NOTE — MISCELLANEOUS
Message  Return to work or school:        Please excuse from work 12/5/2017          Signatures   Electronically signed by : Nehal Menjivar, ; Dec  8 2017  7:50AM EST                       (Author)    Electronically signed by : Brianda Salamanca DO; Dec 11 2017  8:34AM EST                       (Author)

## 2018-01-23 NOTE — MISCELLANEOUS
Message  Return to work or school:   Chelsea Mireles is under my professional care  She was seen in my office on 11/29/2017       Please excuse from work 11/28/2017-12/1/2017          Signatures   Electronically signed by : Leonora Magdaleno, ; Dec  8 2017  7:49AM EST                       (Author)

## 2018-01-23 NOTE — RESULT NOTES
Discussion/Summary   There was evidence of some inflammation of the duodenum which is nonspecific  Will request celiac disease for further evaluation  Please have the patient do these last before she follows with Dr Lambert Vasquez  Biopsies of the colon were negative I did call her and we will message to let her know that the biopsies were negative  Inflammation seen on colonoscopy may be related to preparation effect rather than a chronic condition  I would advise her to stop mesalamine and steroids  Verified Results  (1) TISSUE EXAM [id number] 11:49AM Debbi Orourke     Test Name Result Flag Reference   LAB AP CASE REPORT (Report)     Surgical Pathology Report             Case: [id number]                   Authorizing Provider: Leopold Ewings, MD      Collected:      12/05/2017 1149        Ordering Location:   [address] [place] [address]   Received:      12/05/2017 [address] [place] [address] Endoscopy                               Pathologist:      Kenzie Montez MD                                 Specimens:  A) - Duodenum, duodenum bx-cold                                    B) - Stomach, gastric bx-cold                                     C) - Small Bowel, NOS, terminal ileum bx-cold                             D) - Colon, right colon random bx-cold                                 E) - Large Intestine, Transverse Colon, transverse colon bx-cold                    F) - Large Intestine, Left/Descending Colon, descending colon bx-cold                 G) - Large Intestine, Sigmoid Colon, sigmoid colon bx-cold                       H) - Rectum, rectum bx-cold   LAB AP FINAL DIAGNOSIS (Report)     A  Duodenum (biopsy):    - Mild chronic, nonspecific duodenitis with associated partial villous   blunting     - No marked increase in intraepithelial lymphocytes  - No granulomas, dysplasia or neoplasia identified  B  Stomach (biopsy):    - Minimal chronic inactive gastritis involving oxyntic and foveolar   mucosa      - Immunostain for H  pylori (with appropriate positive control) is   negative  - No intestinal metaplasia, dysplasia or neoplasia identified  C  Terminal ileum (biopsy):    - Small bowel mucosa with prominent lymphoid aggregate formation     - No marked increase in intraepithelial lymphocytes  - No granulomas, dysplasia or carcinoma identified  D  Right colon (random biopsy):    - Colonic mucosa with no significant pathologic abnormalities  - No active inflammation or histologic evidence of a microscopic   (lymphocytic)     or collagenous colitis noted  - No granulomas, dysplasia or neoplasia identified  E  Transverse colon (biopsy):    - Colonic mucosa with no significant pathologic abnormalities  - No active inflammation or histologic evidence of a microscopic   (lymphocytic)     or collagenous colitis noted  - No granulomas, dysplasia or neoplasia identified  F  Descending colon (biopsy):    - Colonic mucosa with no significant pathologic abnormalities  - No active inflammation or histologic evidence of a microscopic   (lymphocytic)     or collagenous colitis noted  - No granulomas, dysplasia or neoplasia identified  G  Sigmoid colon (biopsy):    - Colonic mucosa with no significant pathologic abnormalities  - No active inflammation or histologic evidence of a microscopic   (lymphocytic)     or collagenous colitis noted  - No granulomas, dysplasia or neoplasia identified  H  Rectum (biopsy):    - Colonic mucosa with no significant pathologic abnormalities  - No active inflammation or histologic evidence of a microscopic   (lymphocytic)     or collagenous colitis noted  - No granulomas, dysplasia or neoplasia identified  Electronically signed by Esperanza Delcid MD on 12/8/2017 at 11:51 AM   LAB AP NOTE      Interpretation performed at [facility], [address] [place] [address], [address] [place] [id number]     LAB AP SURGICAL ADDITIONAL INFORMATION (Report)     All controls performed with the immunohistochemical stains reported above   reacted appropriately  These tests were developed and their performance   characteristics determined by [facility] or   [facility]  They may not be cleared or approved by the [place]  Food and Drug Administration  The FDA has determined that such clearance   or approval is not necessary  These tests are used for clinical purposes  They should not be regarded as investigational or for research  This   laboratory has been approved by [place] [id number], designated as a high-complexity   laboratory and is qualified to perform these tests  LAB AP GROSS DESCRIPTION (Report)     A  The specimen is received in formalin, labeled with the patient's name   and hospital number, and is designated Duodenum biopsy  The specimen   consists of 3 tan soft tissue fragments measuring 0 1, 0 2, and 0 3 cm in   greatest dimension  Entirely submitted in one cassette  B  The specimen is received in formalin, labeled with the patient's name   and hospital number, and is designated Gastric biopsy  The specimen   consists of 3 tan soft tissue fragments measuring 0 1, 0 2, and 0 2 cm in   greatest dimension  Entirely submitted in one cassette  C  The specimen is received in formalin, labeled with the patient's name   and hospital number, and is designated Terminal ileum biopsy  The   specimen consists of 2 tan soft tissue fragments measuring 0 1 and 0 7 cm   in greatest dimension  Entirely submitted in one cassette  D  The specimen is received in formalin, labeled with the patient's name   and hospital number, and is designated Right colon random biopsy  The   specimen consists of multiple tan soft tissue fragments measuring in loose   aggregate 1 0 x 0 4 x 0 1 cm  Entirely submitted in one cassette  E  The specimen is received in formalin, labeled with the patient's name   and hospital number, and is designated  Transverse colon biopsy   The specimen consists of multiple tan soft tissue fragments measuring in loose   aggregate 0 6 x 0 4 x 0 2 cm  Entirely submitted in one cassette  F  The specimen is received in formalin, labeled with the patient's name   and hospital number, and is designated Descending colon biopsy  The   specimen consists of 3 tan soft tissue fragments measuring 0 1, 0 2, and   0 4 cm in greatest dimension  Entirely submitted in one cassette  G  The specimen is received in formalin, labeled with the patient's name   and hospital number, and is designated Sigmoid colon biopsy  The   specimen consists of multiple tan soft tissue fragments measuring in loose   aggregate 1 5 x 0 4 x 0 2 cm  Entirely submitted in one cassette  H  The specimen is received in formalin, labeled with the patient's name   and hospital number, and is designated Rectum biopsy  The specimen   consists of 2 tan soft tissue fragments each measuring 0 2 cm in greatest   dimension  Entirely submitted in one cassette  Note: The estimated total formalin fixation time based upon information   provided by the submitting clinician and the standard processing schedule   is less than 72 hours      AKemmerer   LAB AP CLINICAL INFORMATION Blood in stool     R/O Celiac disease

## 2018-01-23 NOTE — RESULT NOTES
Discussion/Summary   Please let patient know stool negative for parasites, good news     Verified Results  (1) OVA AND PARASITES EXAM [id number] 08:18AM Ankit Lim Order Number: [id number]     Test Name Result Flag Reference   O&P CONC  EXAM      No ova, cysts, or parasites seen     One negative specimen does not rule out the possibility of a  parasitic infection  These results were obtained using wet preparation(s) and trichrome  stained smear  This test does not include testing for Cryptosporidium  parvum, Cyclospora, or Microsporidia      Performed at:  [facility] [place] [address]  [id number]  : Bruce Saldivar MD, Phone:  [phone]

## 2018-01-23 NOTE — MISCELLANEOUS
Message  GI Reminder Recall [facility]:   Date: 12/27/2017   Dear Samantha Hoyt:     Review of our records shows you are due for the following: Our office has tried to contact you  Please call us at [phone] for your lab results  Please call the following office to schedule your appointment:     We look forward to hearing from you!      Sincerely,     [facility]      Signatures   Electronically signed by : Jason Zambrano, ; Dec 27 2017  1:17PM EST                       (Author)

## 2018-01-23 NOTE — MISCELLANEOUS
Message  Discussed her symptoms in detail, she reports nausea, vomiting x 2 (food, no blood), diarrhea (watery, more than 7 BMs today) and crampy abdominal pain  She has tried Bentyl without relief  She is still adamant about not going to the ED  Recommend ruling out C  diff infection, ordered this If this is negative we can consider trying Xifaxin  She will also get bloodwork done for Celiac disease  Recommend low FODMAP diet  She will keep her f/u with Dr Henrry Rabago  Plan  Chronic diarrhea    · (1) C  DIFFICILE TOXIN BY PCR; Status:Active;  Requested for:[id number];     Signatures   Electronically signed by : Jo May, [facility]; Dec 12 2017  3:24PM EST                       (Author)

## 2018-01-26 ENCOUNTER — OFFICE VISIT (OUTPATIENT)
Dept: SLEEP CENTER | Facility: CLINIC | Age: 28
End: 2018-01-26
Payer: COMMERCIAL

## 2018-01-26 VITALS
SYSTOLIC BLOOD PRESSURE: 130 MMHG | HEIGHT: 62 IN | BODY MASS INDEX: 41.11 KG/M2 | WEIGHT: 223.4 LBS | HEART RATE: 60 BPM | DIASTOLIC BLOOD PRESSURE: 80 MMHG

## 2018-01-26 DIAGNOSIS — E61.1 IRON DEFICIENCY: ICD-10-CM

## 2018-01-26 DIAGNOSIS — E66.09 CLASS 2 OBESITY DUE TO EXCESS CALORIES WITH BODY MASS INDEX (BMI) OF 39.0 TO 39.9 IN ADULT, UNSPECIFIED WHETHER SERIOUS COMORBIDITY PRESENT: ICD-10-CM

## 2018-01-26 DIAGNOSIS — G47.10 HYPERSOMNIA: ICD-10-CM

## 2018-01-26 DIAGNOSIS — G47.39 OTHER SLEEP APNEA: ICD-10-CM

## 2018-01-26 DIAGNOSIS — G47.30 SLEEP-RELATED BREATHING DISORDER: Primary | ICD-10-CM

## 2018-01-26 DIAGNOSIS — F41.9 ANXIETY: ICD-10-CM

## 2018-01-26 DIAGNOSIS — G47.61 PLMD (PERIODIC LIMB MOVEMENT DISORDER): ICD-10-CM

## 2018-01-26 DIAGNOSIS — F32.89 OTHER DEPRESSION: ICD-10-CM

## 2018-01-26 RX ORDER — ROPINIROLE 0.25 MG/1
0.25 TABLET, FILM COATED ORAL
Qty: 60 TABLET | Refills: 3 | Status: SHIPPED | OUTPATIENT
Start: 2018-01-26 | End: 2018-03-27 | Stop reason: DRUGHIGH

## 2018-01-26 NOTE — PROGRESS NOTES
Follow-up Note - [address]  32 y o  female  :1990  XMR:[id number]    Physician Requesting Consult:     I saw Fermin Almeida in sleep clinic today for her sleep disordered breathing, coexisting sleep complaints & medical conditions  A diagnostic sleep study was undertaken and patient is here to review results and treatment options  The study demonstrated an RDI of 3/ hour with minimum oxygen saturation of 88 %  [Intermittent] snoring of mild-to-loud intensity was noted  There were periodic limb movements of sleep [ Index of] 18/ hour  Past, Family, Social History & ROS were reviewed  She was on nortriptyline for a while but discontinued use because of blurring of vision  She is now once again using Celexa  Interval changes in PMH and medications were noted  Herewith findings in summary  Full Details are available on request     She has ongoing fatigue and drowsiness in spite of getting 8-10 hours sleep  She awakens feeling unrefreshed, has constant fatigue and excessive drowsiness  She rated herself at 16/24 on the [place] Sleepiness Scale, takes naps and also dozes off inadvertently  She reports low back pain and also some radicular symptoms  She had recent GI bleed for which she required colonoscopy  She denied restless leg symptoms and is not aware of jerking movements during sleep  She reports ongoing symptoms of depression  On Exam: Physical findings are essentially unchanged from previous  Impression:  1  Sleep-related breathing disorder     2  PLMD (periodic limb movement disorder)  rOPINIRole (REQUIP) 0 25 mg tablet    Ferritin    Ferritin   3  Hypersomnia     4  Other depression     5  Anxiety     6  Class 2 obesity due to excess calories with body mass index (BMI) of 39 0 to 39 9 in adult, unspecified whether serious comorbidity present     7  Iron deficiency     8  Other sleep apnea  Sleep F/U CPAP - established patient     Plan:  1   I reviewed results of the Sleep study with the patient  2  With respect to above conditions, I counseled on pathophysiology, diagnosis, treatment options, risks and benefits; inter-relationship and effects on symptoms and comorbidities; risks of no treatment; costs and insurance aspects  3   For her mild sleep disordered breathing I advised weight reduction, positional therapy or mandibular advancement device  4   I advised considering an alternate to Celexa that may underlie the periodic limb movements of sleep  In her case Wellbutrin may also help with weight reduction  5   I requested a ferritin assay to check for iron deficiency  6   In the interim, I gave her a prescription for Requip that will need to be titrated to effect  5  Follow-up to be scheduled after the study to review results and to initiate therapy  I will see her for follow-up in 2 months to monitor progress and to adjust therapy  Thank you for allowing me to participate in the care of this patient  I will keep you apprised of developments      Sincerely,    Aiden Gant MD  Board Certified Specialist

## 2018-01-26 NOTE — PROGRESS NOTES
Review of Systems      Genitourinary none   Cardiology none   Gastrointestinal heartburn/acid reflux and abdominal pain disturbing sleep   Neurology muscle weakness and loss/change of vision   Constitutional weight change of 10 or more pounds in the past year gain of about 80 pounds   Integumentary none   Psychiatry anxiety, depression and irritability   Musculoskeletal back pain and sciatica   Pulmonary awakening with choking sensation and shortness of breath   ENT none   Endocrine intolerance of heat and intolerance of cold   Hematological none

## 2018-03-27 ENCOUNTER — OFFICE VISIT (OUTPATIENT)
Dept: SLEEP CENTER | Facility: CLINIC | Age: 28
End: 2018-03-27

## 2018-03-27 VITALS
BODY MASS INDEX: 42.73 KG/M2 | SYSTOLIC BLOOD PRESSURE: 122 MMHG | HEART RATE: 75 BPM | WEIGHT: 232.2 LBS | HEIGHT: 62 IN | DIASTOLIC BLOOD PRESSURE: 66 MMHG

## 2018-03-27 DIAGNOSIS — F41.9 ANXIETY: ICD-10-CM

## 2018-03-27 DIAGNOSIS — G47.10 HYPERSOMNIA: ICD-10-CM

## 2018-03-27 DIAGNOSIS — E66.09 CLASS 2 OBESITY DUE TO EXCESS CALORIES WITH BODY MASS INDEX (BMI) OF 39.0 TO 39.9 IN ADULT, UNSPECIFIED WHETHER SERIOUS COMORBIDITY PRESENT: ICD-10-CM

## 2018-03-27 DIAGNOSIS — F32.89 OTHER DEPRESSION: ICD-10-CM

## 2018-03-27 DIAGNOSIS — G47.30 SLEEP-RELATED BREATHING DISORDER: ICD-10-CM

## 2018-03-27 DIAGNOSIS — E61.1 IRON DEFICIENCY: ICD-10-CM

## 2018-03-27 DIAGNOSIS — G47.61 PLMD (PERIODIC LIMB MOVEMENT DISORDER): Primary | ICD-10-CM

## 2018-03-27 PROBLEM — F32.A DEPRESSION: Status: ACTIVE | Noted: 2018-03-27

## 2018-03-27 RX ORDER — ROPINIROLE 0.5 MG/1
TABLET, FILM COATED ORAL
Qty: 90 TABLET | Refills: 1 | Status: SHIPPED | OUTPATIENT
Start: 2018-03-27 | End: 2019-09-18

## 2018-03-27 NOTE — PROGRESS NOTES
Review of Systems      Genitourinary frequent urination   Cardiology none   Gastrointestinal heartburn/acid reflux   Neurology none   Constitutional weight change of 10 or more pounds in the past year gain of UNKNOWN pounds   Integumentary none   Psychiatry anxiety and depression   Musculoskeletal muscle tenderness/aching and back pain   Pulmonary none   ENT none   Endocrine none   Hematological none

## 2018-03-27 NOTE — PROGRESS NOTES
Follow-up Note - [address]  32 y o  female  :1990  GCN:[id number]    I saw Cyril in sleep clinic today for her periodic limb movements of sleep, coexisting sleep complaints & medical conditions  Medications, Past, Family, Social History were reviewed  [Interval changes notable for her seasonal allergies and good are controlled  She is no longer taking Pamelor  She feels mood is stable on citalopram ]     She  has a past medical history of Abdominal pain; Abnormal CAT scan; Anxiety; Blood in stool; Colitis; Depression; GERD (gastroesophageal reflux disease); and IBS (irritable bowel syndrome)  She has a current medication list which includes the following prescription(s): citalopram, hyoscyamine-phenyltoloxamine, omeprazole, dicyclomine, nortriptyline, and ropinirole  ROS: reviewed see attached  [& significant for some weight gain]  HPI:  She feels less tired but is taking longer to fall asleep  She awakens feeling more refreshed but continues to report some fatigue during the daytime  She reports ongoing snoring that occasionally awakens her  She not aware of jerking movements during sleep since starting Requip  Sleep Routine:  She is spending 7-1/2 hours in bed on week nights and up to 10 hours on weekends     She is able to fall asleep within 30 minutes most days and occasionally may take up to 60 minutes  She has in frequent interruptions of sleep  She awakens with the aid of an alarm feeling rested at 1st  [She rated herself at Total score: 7 /24 on the [place] sleepiness scale, but still yawns during the daytime and feels like napping ]  She naps occasionally but will do so for several hours when she has the opportunity  On Exam: Vitals are stable: Blood pressure 122/66, pulse 75, height 5' 2" (1 575 m), weight 105 kg (232 lb 3 2 oz)  Body mass index is 42 47 kg/m²  Vicki Villanueva Neck Circumference: 42cm     Patient is [well groomed] alert, orientated, cooperative [and in no distress]  Mental state [appeared normal]  Apart from higher weight, Physical findings are essentially unchanged from previous  IMPRESSION:    1  PLMD (periodic limb movement disorder)  rOPINIRole (REQUIP) 0 5 mg tablet   2  Sleep-related breathing disorder     3  Hypersomnia     4  Other depression     5  Anxiety     6  Class 2 obesity due to excess calories with body mass index (BMI) of 39 0 to 39 9 in adult, unspecified whether serious comorbidity present     7  Iron deficiency         PLAN:    1  Cognitive behavioral therapy was continued, Sleep Hygiene and behavioral techniques to manage Insomnia were discussed  I advise keeping a regular routine, starting an exercise program, limiting her time in bed to 7-1/2 hours consistently and on relaxation techniques  2   She is to continue with Requip at 0 5 mg q h s  3   She did not get her ferritin level checked and still has a prescription in place to do so  4    I advised weight reduction that should remediate her snoring and mild sleep disordered breathing  5  If she continues to report daytime fatigue, consideration may be given to Wellbutrin that has a stimulant effect and may also assist with weight reduction  6   I advised follow-up in 6 months or sooner if needed  Thank you for allowing me to participate in the care of this patient          Sincerely,    Guillermo Chaudhary MD  Board Certified Specialist

## 2018-03-30 ENCOUNTER — TELEPHONE (OUTPATIENT)
Dept: SLEEP CENTER | Facility: CLINIC | Age: 28
End: 2018-03-30

## 2018-04-09 ENCOUNTER — OFFICE VISIT (OUTPATIENT)
Dept: INTERNAL MEDICINE CLINIC | Facility: CLINIC | Age: 28
End: 2018-04-09
Payer: COMMERCIAL

## 2018-04-09 VITALS
HEART RATE: 88 BPM | WEIGHT: 230 LBS | SYSTOLIC BLOOD PRESSURE: 124 MMHG | HEIGHT: 62 IN | DIASTOLIC BLOOD PRESSURE: 68 MMHG | OXYGEN SATURATION: 97 % | BODY MASS INDEX: 42.33 KG/M2

## 2018-04-09 DIAGNOSIS — L70.9 ACNE, UNSPECIFIED ACNE TYPE: ICD-10-CM

## 2018-04-09 DIAGNOSIS — R53.83 OTHER FATIGUE: Primary | ICD-10-CM

## 2018-04-09 DIAGNOSIS — R63.5 WEIGHT GAIN: ICD-10-CM

## 2018-04-09 PROCEDURE — 99213 OFFICE O/P EST LOW 20 MIN: CPT | Performed by: INTERNAL MEDICINE

## 2018-04-09 RX ORDER — ONDANSETRON 8 MG/1
TABLET, ORALLY DISINTEGRATING ORAL
COMMUNITY
Start: 2017-12-13 | End: 2018-09-12 | Stop reason: ALTCHOICE

## 2018-04-09 RX ORDER — FEXOFENADINE HYDROCHLORIDE 60 MG/1
TABLET, FILM COATED ORAL
COMMUNITY
Start: 2010-09-21

## 2018-04-09 RX ORDER — HYOSCYAMINE SULFATE 0.125 MG
0.12 TABLET ORAL
COMMUNITY
Start: 2013-01-07 | End: 2018-04-09 | Stop reason: SDUPTHER

## 2018-04-10 NOTE — PROGRESS NOTES
Assessment/Plan:    No problem-specific Assessment & Plan notes found for this encounter  Diagnoses and all orders for this visit:    Other fatigue  -     Ambulatory referral to Weight Management; Future  -     CBC and differential  -     Comprehensive metabolic panel  -     TSH, 3rd generation  -     17-Hydroxyprogesterone  -     Androsterone level  -     Cortisol, Free, Urine, 24 Hour  -     DHEA-sulfate  -     Insulin, random  -     Testosterone, Free and Weakly Bound  -     Cortisol, Free, Urine, 24 Hour    Weight gain  -     Ambulatory referral to Weight Management; Future  -     TSH, 3rd generation  -     17-Hydroxyprogesterone  -     Androsterone level  -     Cortisol, Free, Urine, 24 Hour  -     DHEA-sulfate  -     Insulin, random  -     Testosterone, Free and Weakly Bound  -     Cortisol, Free, Urine, 24 Hour    Acne, unspecified acne type  -     17-Hydroxyprogesterone  -     Androsterone level  -     Cortisol, Free, Urine, 24 Hour  -     DHEA-sulfate  -     Insulin, random  -     Testosterone, Free and Weakly Bound  -     Cortisol, Free, Urine, 24 Hour    Other orders  -     ondansetron (ZOFRAN-ODT) 8 mg disintegrating tablet; Take by mouth  -     levonorgestrel (MIRENA) 20 MCG/24HR IUD; 1 each by Intrauterine route  -     Discontinue: hyoscyamine (ANASPAZ,LEVSIN) 0 125 MG tablet; 0 125 mg  -     fexofenadine (ALLEGRA) 60 MG tablet; one by mouth daily prn        Subjective:      Patient ID: Claudeen Pale is a 32 y o  female  Patient with history of IBS and depression is here to follow-up on chronic medical problems  She reports no further rectal bleeding and her diarrhea has resolved as well  She  reports no further abdominal cramps  She is interested in weight loss and would be agreeable to see weight management team   Due for lab studies that will be ordered today  Her mood is much better on current dose of Celexa 20 mg          The following portions of the patient's history were reviewed and updated as appropriate: allergies, current medications, past medical history, past social history, past surgical history and problem list     Review of Systems   Constitutional: Positive for unexpected weight change  Negative for chills and fatigue  Respiratory: Negative for cough and shortness of breath  Cardiovascular: Negative for chest pain, palpitations and leg swelling  Gastrointestinal: Negative for abdominal pain, blood in stool, constipation and diarrhea  See above   Endocrine:        Massive weight gain   Genitourinary: Positive for menstrual problem  Skin:        Acne and facial hair         Objective:      /68 (BP Location: Left arm, Patient Position: Sitting, Cuff Size: Extra-Large)   Pulse 88   Ht 5' 2" (1 575 m)   Wt 104 kg (230 lb)   SpO2 97%   BMI 42 07 kg/m²          Physical Exam   HENT:   Mouth/Throat: Oropharynx is clear and moist  No oropharyngeal exudate  Eyes: No scleral icterus  Neck: No thyromegaly present  Cardiovascular: Normal rate, regular rhythm and normal heart sounds  Pulmonary/Chest: Effort normal and breath sounds normal  No respiratory distress  Abdominal: Soft  Bowel sounds are normal  She exhibits no distension  There is no tenderness  There is no rebound  Musculoskeletal: She exhibits no edema  Lymphadenopathy:     She has no cervical adenopathy     Skin:   Positive up acne on the chin jaw line

## 2018-05-21 LAB
17OHP SERPL-MCNC: 25 NG/DL
ALBUMIN SERPL-MCNC: 4.2 G/DL (ref 3.6–5.1)
ALBUMIN/GLOB SERPL: 1.4 (CALC) (ref 1–2.5)
ALP SERPL-CCNC: 97 U/L (ref 33–115)
ALT SERPL-CCNC: 27 U/L (ref 6–29)
AST SERPL-CCNC: 21 U/L (ref 10–30)
BASOPHILS # BLD AUTO: 28 CELLS/UL (ref 0–200)
BASOPHILS NFR BLD AUTO: 0.3 %
BILIRUB SERPL-MCNC: 0.5 MG/DL (ref 0.2–1.2)
BUN SERPL-MCNC: 13 MG/DL (ref 7–25)
BUN/CREAT SERPL: NORMAL (CALC) (ref 6–22)
CALCIUM SERPL-MCNC: 9.3 MG/DL (ref 8.6–10.2)
CHLORIDE SERPL-SCNC: 107 MMOL/L (ref 98–110)
CO2 SERPL-SCNC: 26 MMOL/L (ref 20–31)
CREAT SERPL-MCNC: 0.93 MG/DL (ref 0.5–1.1)
DHEA-S SERPL-MCNC: 156 MCG/DL (ref 18–391)
EOSINOPHIL # BLD AUTO: 64 CELLS/UL (ref 15–500)
EOSINOPHIL NFR BLD AUTO: 0.7 %
ERYTHROCYTE [DISTWIDTH] IN BLOOD BY AUTOMATED COUNT: 14.3 % (ref 11–15)
GLOBULIN SER CALC-MCNC: 2.9 G/DL (CALC) (ref 1.9–3.7)
GLUCOSE SERPL-MCNC: 88 MG/DL (ref 65–139)
HCT VFR BLD AUTO: 40.9 % (ref 35–45)
HGB BLD-MCNC: 13.2 G/DL (ref 11.7–15.5)
INSULIN SERPL-ACNC: 34.8 UIU/ML (ref 2–19.6)
LYMPHOCYTES # BLD AUTO: 1886 CELLS/UL (ref 850–3900)
LYMPHOCYTES NFR BLD AUTO: 20.5 %
Lab: 15.6 NG/DL (ref 20–80)
MCH RBC QN AUTO: 26.6 PG (ref 27–33)
MCHC RBC AUTO-ENTMCNC: 32.3 G/DL (ref 32–36)
MCV RBC AUTO: 82.5 FL (ref 80–100)
MONOCYTES # BLD AUTO: 635 CELLS/UL (ref 200–950)
MONOCYTES NFR BLD AUTO: 6.9 %
NEUTROPHILS # BLD AUTO: 6587 CELLS/UL (ref 1500–7800)
NEUTROPHILS NFR BLD AUTO: 71.6 %
PLATELET # BLD AUTO: 400 THOUSAND/UL (ref 140–400)
PMV BLD REES-ECKER: 10.6 FL (ref 7.5–12.5)
POTASSIUM SERPL-SCNC: 4.1 MMOL/L (ref 3.5–5.3)
PROT SERPL-MCNC: 7.1 G/DL (ref 6.1–8.1)
RBC # BLD AUTO: 4.96 MILLION/UL (ref 3.8–5.1)
SL AMB EGFR AFRICAN AMERICAN: 98 ML/MIN/1.73M2
SL AMB EGFR NON AFRICAN AMERICAN: 84 ML/MIN/1.73M2
SODIUM SERPL-SCNC: 139 MMOL/L (ref 135–146)
TESTOST FREE SERPL-MCNC: 3.7 PG/ML (ref 0.1–6.4)
TESTOST SERPL-MCNC: 25 NG/DL (ref 2–45)
TSH SERPL-ACNC: 1.19 MIU/L
WBC # BLD AUTO: 9.2 THOUSAND/UL (ref 3.8–10.8)

## 2018-05-23 DIAGNOSIS — K21.9 GERD WITH APNEA: ICD-10-CM

## 2018-05-23 DIAGNOSIS — R06.81 GERD WITH APNEA: ICD-10-CM

## 2018-05-23 DIAGNOSIS — F32.A DEPRESSION, UNSPECIFIED DEPRESSION TYPE: Primary | ICD-10-CM

## 2018-05-23 RX ORDER — OMEPRAZOLE 40 MG/1
40 CAPSULE, DELAYED RELEASE ORAL DAILY
Qty: 90 CAPSULE | Refills: 3 | Status: SHIPPED | OUTPATIENT
Start: 2018-05-23 | End: 2019-05-19 | Stop reason: SDUPTHER

## 2018-05-23 RX ORDER — CITALOPRAM 20 MG/1
10 TABLET ORAL DAILY
Qty: 90 TABLET | Refills: 3 | Status: SHIPPED | OUTPATIENT
Start: 2018-05-23 | End: 2018-12-19 | Stop reason: SDUPTHER

## 2018-05-23 NOTE — TELEPHONE ENCOUNTER
Gavin Riedel is calling for a refill of her Omeprazole 40mg and Celexa 20mg  She would like it sent to her retail, [facility] in [place]

## 2018-05-24 LAB
CORTIS F 24H UR-MRATE: 22.8 MCG/24 H (ref 4–50)
CREAT 24H UR-MRATE: 1.72 G/24 H (ref 0.63–2.5)
SPECIMEN VOL 24H UR: 1100 ML

## 2018-05-25 ENCOUNTER — TELEPHONE (OUTPATIENT)
Dept: INTERNAL MEDICINE CLINIC | Facility: CLINIC | Age: 28
End: 2018-05-25

## 2018-05-31 ENCOUNTER — OFFICE VISIT (OUTPATIENT)
Dept: BARIATRICS | Facility: CLINIC | Age: 28
End: 2018-05-31
Payer: COMMERCIAL

## 2018-05-31 VITALS
RESPIRATION RATE: 16 BRPM | TEMPERATURE: 98.2 F | HEIGHT: 62 IN | SYSTOLIC BLOOD PRESSURE: 120 MMHG | WEIGHT: 234.44 LBS | DIASTOLIC BLOOD PRESSURE: 78 MMHG | HEART RATE: 88 BPM | BODY MASS INDEX: 43.14 KG/M2

## 2018-05-31 DIAGNOSIS — F32.89 OTHER DEPRESSION: ICD-10-CM

## 2018-05-31 DIAGNOSIS — E66.01 OBESITY, CLASS III, BMI 40-49.9 (MORBID OBESITY) (HCC): ICD-10-CM

## 2018-05-31 DIAGNOSIS — G47.30 SLEEP-RELATED BREATHING DISORDER: ICD-10-CM

## 2018-05-31 DIAGNOSIS — R63.5 WEIGHT GAIN: Primary | ICD-10-CM

## 2018-05-31 DIAGNOSIS — R73.01 IFG (IMPAIRED FASTING GLUCOSE): ICD-10-CM

## 2018-05-31 PROCEDURE — 99204 OFFICE O/P NEW MOD 45 MIN: CPT | Performed by: PHYSICIAN ASSISTANT

## 2018-05-31 NOTE — ASSESSMENT & PLAN NOTE
-Discussed options of [facility]-Intensive Lifestyle Intervention Program, Very Low Calorie Diet-VLCD, Conservative Program, Stephanie-En-Y Gastric Bypass and Vertical Sleeve Gastrectomy and the role of weight loss medications   -Initial weight loss goal of 5-10% weight loss for improved health  -Screening labs: Check LP, fasting insulin, and A1c, otherwise awaiting results as ordered by PCP  -Patient is interested in pursuing MWM vs bariatric surgery  She will attend the surgical information seminar and follow up in 8 weeks   For now, will start her on the conservative program

## 2018-05-31 NOTE — PROGRESS NOTES
Assessment/Plan:    Obesity, Class III, BMI 40-49 9 (morbid obesity) ([facility] [address] )  -Discussed options of HealthyCORE-Intensive Lifestyle Intervention Program, Very Low Calorie Diet-VLCD, Conservative Program, Stephanie-En-Y Gastric Bypass and Vertical Sleeve Gastrectomy and the role of weight loss medications   -Initial weight loss goal of 5-10% weight loss for improved health  -Screening labs: Check LP, fasting insulin, and A1c, otherwise awaiting results as ordered by PCP  -Patient is interested in pursuing MWM vs bariatric surgery  She will attend the surgical information seminar and follow up in 8 weeks  For now, will start her on the conservative program      Sleep-related breathing disorder  -this may improve with weight loss  -continue management with sleep medicine    Depression  -taking citalopram  -continue management with prescribing provider    Follow up in approximately 2 months with Non-Surgical Physician/Advanced Practitioner  Goals:  Food log (ie ) www myfitnesspal com,sparkpeople  com,loseit com,calorieking  com,etc    No sugary beverages  At least 64oz of water daily  Increase physical activity by 10 minutes daily Gradually increase physical activity to a goal of 5 days per week for 30 minutes of MODERATE intensity PLUS 2 days per week of FULL BODY resistance training  0861-0531 calories per day  5-10 servings of fruits and vegetables per day  25-35 grams of dietary fiber per day    Diagnoses and all orders for this visit:    Weight gain  -     Ambulatory referral to Weight Management  -     Lipid panel; Future  -     HEMOGLOBIN A1C W/ EAG ESTIMATION; Future  -     Insulin, fasting; Future    Obesity, Class III, BMI 40-49 9 (morbid obesity) ([facility])  -     Lipid panel; Future  -     HEMOGLOBIN A1C W/ EAG ESTIMATION; Future  -     Insulin, fasting;  Future    Sleep-related breathing disorder    Other depression    IFG (impaired fasting glucose)  Comments:  -check A1c and fasting insulin  Orders:  -     Lipid panel; Future  -     HEMOGLOBIN A1C W/ EAG ESTIMATION; Future  -     Insulin, fasting; Future    Subjective:   Chief Complaint   Patient presents with    Consult     Pt is here for MWM consult  Patient ID: Melissa Hdez  is a 32 y o  female with excess weight/obesity here to pursue weight managment  Past Medical History:   Diagnosis Date    Abdominal pain     Abnormal CAT scan     Anxiety     Blood in stool     Candidiasis of skin     Chronic diarrhea     Chronic fatigue     Colitis     Community acquired bacterial pneumonia     Depression     Dyspepsia     GERD (gastroesophageal reflux disease)     Hemorrhoids     Herpes labialis     IBS (irritable bowel syndrome)     Left ovarian cyst     Nausea & vomiting     Sleep pattern disturbance     Strain of thoracic spine, initial encounter      HPI:  Obesity/Excess Weight:  Severity: Severe  Onset:  Senior year of high school    Modifiers: Diet and Exercise and Commercial Weight Loss Programs-ie  Weight Watchers, [place], Nutrisystem, etc   Contributing factors: Medications, particularly OCP and Celexa  Associated symptoms: increased shortness of breath, inability to do certain activities and difficulty sleeping  1/3/18- mild sleep disordered breathing    Goals: 160-170 lbs  Drinks a lot of gatorade, limited water  Drinks 1 c of coffee per day with cream and sugar  The following portions of the patient's history were reviewed and updated as appropriate: allergies, current medications, past family history, past medical history, past social history, past surgical history and problem list     Review of Systems   Constitutional: Negative for chills and fever  HENT: Negative for sore throat  Respiratory: Positive for cough (after meal, occuring entire) and shortness of breath (will f/u with PCP if sx persist or worsen)  Cardiovascular: Negative for chest pain and palpitations  Gastrointestinal: Positive for diarrhea   Negative for abdominal pain, constipation, nausea and vomiting  GERD controlled with omeprazole   Genitourinary: Negative for dysuria  Musculoskeletal: Negative for arthralgias  Skin: Negative for rash  Neurological: Negative for headaches  Psychiatric/Behavioral: Negative for suicidal ideas (Denies HI)  Reports sx well controlled with Celexa  Managed by psychiatry  Objective:    /78 (BP Location: Left arm, Patient Position: Sitting, Cuff Size: Large)   Pulse 88   Temp 98 2 °F (36 8 °C) (Tympanic)   Resp 16   Ht 5' 2" (1 575 m)   Wt 106 kg (234 lb 7 oz)   BMI 42 88 kg/m²     Physical Exam   Nursing note and vitals reviewed  Constitutional   General appearance: Abnormal   well developed and morbidly obese  Eyes No conjunctival pallor  Ears, Nose, Mouth, and Throat Oral mucosa moist    Pulmonary   Respiratory effort: No increased work of breathing or signs of respiratory distress  Auscultation of lungs: Clear to auscultation, equal breath sounds bilaterally, no wheezes, no rales, no rhonci  Cardiovascular   Auscultation of heart: Normal rate and rhythm, normal S1 and S2, without murmurs  Examination of extremities for edema and/or varicosities: Normal   no edema  Abdomen   Abdomen: Abnormal   The abdomen was obese  Bowel sounds were normal  The abdomen was soft and nontender     Musculoskeletal   Gait and station: Normal     Psychiatric   Orientation to person, place and time: Normal     Affect: appropriate

## 2018-05-31 NOTE — PATIENT INSTRUCTIONS
Goals: Food log (ie ) www myfitnesspal com,sparkpeople  com,loseit com,calorieking  com,etc    No sugary beverages  At least 64oz of water daily    Increase physical activity by 10 minutes daily Gradually increase physical activity to a goal of 5 days per week for 30 minutes of MODERATE intensity PLUS 2 days per week of FULL BODY resistance training  9182-3897 calories per day  5-10 servings of fruits and vegetables per day  25-35 grams of dietary fiber per day

## 2018-06-20 ENCOUNTER — OFFICE VISIT (OUTPATIENT)
Dept: INTERNAL MEDICINE CLINIC | Facility: CLINIC | Age: 28
End: 2018-06-20
Payer: COMMERCIAL

## 2018-06-20 VITALS
HEIGHT: 62 IN | WEIGHT: 235.4 LBS | OXYGEN SATURATION: 98 % | RESPIRATION RATE: 18 BRPM | TEMPERATURE: 97.8 F | SYSTOLIC BLOOD PRESSURE: 110 MMHG | HEART RATE: 103 BPM | DIASTOLIC BLOOD PRESSURE: 70 MMHG | BODY MASS INDEX: 43.32 KG/M2

## 2018-06-20 DIAGNOSIS — K52.9 CHRONIC DIARRHEA: Primary | ICD-10-CM

## 2018-06-20 DIAGNOSIS — E16.1 HYPERINSULINEMIA: ICD-10-CM

## 2018-06-20 PROCEDURE — 99213 OFFICE O/P EST LOW 20 MIN: CPT | Performed by: INTERNAL MEDICINE

## 2018-06-21 RX ORDER — OCTISALATE, AVOBENZONE, HOMOSALATE, AND OCTOCRYLENE 29.4; 29.4; 49; 25.48 MG/ML; MG/ML; MG/ML; MG/ML
LOTION TOPICAL
Qty: 90 CAPSULE | Refills: 0 | Status: SHIPPED | OUTPATIENT
Start: 2018-06-21 | End: 2019-09-18

## 2018-06-21 NOTE — PROGRESS NOTES
Assessment/Plan:         Diagnoses and all orders for this visit:    Chronic diarrhea    Patient encouraged to start probiotic  Consideration for IBS for the without bacterial overgrowth/ celiac disease  I will discuss her case with Dr Kiet Albarran Product (ALIGN) 4 MG CAPS; One pill a day  Hyperinsulinemia   Massive weight gain  -     Ambulatory referral to Endocrinology; Future      Other orders  -       Subjective:      Patient ID: Aureliano Zarco is a 32 y o  female  Patient is here to follow-up on recent lab studies  Her mother accompanies her today  In pursuit of her weight gain lab studies were ordered that showed high insulin level  Her last TSH, fasting blood sugars were normal   Her 24 hour cortisol levels were fine as well  Androgens levels were also came back normal  She is currently on Mirena  We discussed referral to Endocrinology   Patient also has been ongoing chronic diarrhea  She had an episode of colitis last year that was concerning for IBD a colonoscopy was pursued which showed mild colitis but the biopsies were not indicative of so  She had been checked for celiac disease in the past   She has not had any bleeding per rectum again but has ongoing diarrhea 6+ the day at least per patient  Stools are mushy and float in the toilet  She has never noticed mucus in it  She has been checked for C diff in the past as well  I will follow up with GI for further recommendation  The following portions of the patient's history were reviewed and updated as appropriate: allergies, current medications, past medical history, past social history, past surgical history and problem list     Review of Systems   Constitutional: Positive for unexpected weight change (As above)  Negative for appetite change  Gastrointestinal: Positive for diarrhea          As above   Psychiatric/Behavioral:        See discussion above         Objective:      /70 (BP Location: Left arm, Patient Position: Sitting, Cuff Size: Large)   Pulse 103   Temp 97 8 °F (36 6 °C) (Tympanic)   Resp 18   Ht 5' 2" (1 575 m)   Wt 107 kg (235 lb 6 4 oz)   SpO2 98%   BMI 43 06 kg/m²          Physical Exam   Constitutional: No distress  High BMI noted   Pulmonary/Chest: No respiratory distress  Skin:    Mild acne along her chin   Psychiatric:    Distressed by her weight gain and chronic diarrhea    Appropriate demeanor and otherwise

## 2018-06-28 ENCOUNTER — TELEPHONE (OUTPATIENT)
Dept: GASTROENTEROLOGY | Facility: CLINIC | Age: 28
End: 2018-06-28

## 2018-06-28 ENCOUNTER — TELEPHONE (OUTPATIENT)
Dept: INTERNAL MEDICINE CLINIC | Facility: CLINIC | Age: 28
End: 2018-06-28

## 2018-06-28 NOTE — TELEPHONE ENCOUNTER
She has IBS D planning on going on meds to better control diabetes that may make her diarrhea worse  Discussed trying a combo of imodium (very low dose) and fiber (15-20g)  She can follow up with me in 3 weeks in office     Please have pt follow up with me to discuss how she is doing

## 2018-06-28 NOTE — TELEPHONE ENCOUNTER
Michael called in to let you know that was in contact with Dr Christophe Kyle  She discussed her current situation  He recommended that she start on the insulin regiment that you think would work for her  She did not reach out to any endochrinologist yet  She will be doing that this afternoon and update you when she has an appointment  She extends her thanks for all of your help

## 2018-07-11 DIAGNOSIS — R63.5 ABNORMAL WEIGHT GAIN: Primary | ICD-10-CM

## 2018-07-12 NOTE — TELEPHONE ENCOUNTER
Spoke to Carloz Gamboa through [employer]  Carloz Gamboa stated that patients plan does not cover any weight management drugs  He tried putting test claims through on other weight loss medications and all came up denied

## 2018-08-24 ENCOUNTER — OFFICE VISIT (OUTPATIENT)
Dept: BARIATRICS | Facility: CLINIC | Age: 28
End: 2018-08-24
Payer: COMMERCIAL

## 2018-08-24 VITALS
BODY MASS INDEX: 44.42 KG/M2 | HEIGHT: 62 IN | DIASTOLIC BLOOD PRESSURE: 78 MMHG | WEIGHT: 241.4 LBS | RESPIRATION RATE: 16 BRPM | TEMPERATURE: 98.4 F | SYSTOLIC BLOOD PRESSURE: 118 MMHG | HEART RATE: 88 BPM

## 2018-08-24 DIAGNOSIS — F32.89 OTHER DEPRESSION: ICD-10-CM

## 2018-08-24 DIAGNOSIS — E16.1 HYPERINSULINEMIA: ICD-10-CM

## 2018-08-24 DIAGNOSIS — E66.01 OBESITY, CLASS III, BMI 40-49.9 (MORBID OBESITY) (HCC): Primary | ICD-10-CM

## 2018-08-24 PROBLEM — K21.00 GASTROESOPHAGEAL REFLUX DISEASE WITH ESOPHAGITIS: Status: ACTIVE | Noted: 2018-08-24

## 2018-08-24 PROCEDURE — 99214 OFFICE O/P EST MOD 30 MIN: CPT | Performed by: PHYSICIAN ASSISTANT

## 2018-08-24 NOTE — ASSESSMENT & PLAN NOTE
-Patient is pursuing the Conservative Program now interested in sleeve gastrectomy  -Initial weight loss goal of 5-10% weight loss for improved health    Initial: 234 7 lbs  Current: 241  4   Change: +6 7 lbs

## 2018-08-24 NOTE — PROGRESS NOTES
Assessment/Plan:    Obesity, Class III, BMI 40-49 9 (morbid obesity) ([facility] [address] )  -Patient is pursuing the Conservative Program now interested in sleeve gastrectomy  -Initial weight loss goal of 5-10% weight loss for improved health    Initial: 234 7 lbs  Current: 241  4   Change: +6 7 lbs    Gastroesophageal reflux disease with esophagitis  -May benefit from RYGB > sleeve gastrectomy  -Continue PPI  -Last EGD 12/2017    Depression  -per patient, well controlled  -continue management with PCP    Hyperinsulinemia  -may improve/resolve following bariatric surgery    Follow up for initial evaluation following surgical seminar  Goals:  Attend surgical seminar     Diagnoses and all orders for this visit:    Obesity, Class III, BMI 40-49 9 (morbid obesity) ([facility] [address] )    Other depression    Hyperinsulinemia    Subjective:   Chief Complaint   Patient presents with    Follow-up     Patient here for MWM follow up  Patient ID: Tomaszgnacio Severe  is a 32 y o  female with excess weight/obesity here to pursue weight management  Patient is pursuing Vertical Sleeve Gastrectomy  HPI  The patient presents for MWM follow up  Now interested in sleeve gastrectomy  Patient questions answered  The following portions of the patient's history were reviewed and updated as appropriate: allergies, current medications, past family history, past medical history, past social history, past surgical history and problem list     Review of Systems   Respiratory: Positive for cough and shortness of breath  Sx occur after eating  PPI recently increased  May improve/resolve following bariatric surgery   Cardiovascular: Negative for chest pain and palpitations  Gastrointestinal: Positive for abdominal pain and diarrhea  Psychiatric/Behavioral:        Reports depression sx well controlled       Objective:    /78 (BP Location: Left arm, Patient Position: Sitting, Cuff Size: Large)   Pulse 88   Temp 98 4 °F (36 9 °C) (Tympanic) Resp 16   Ht 5' 2" (1 575 m)   Wt 109 kg (241 lb 6 4 oz)   BMI 44 15 kg/m²      Physical Exam   Nursing note and vitals reviewed  Constitutional   General appearance: Abnormal   well developed and morbidly obese  Eyes No conjunctival pallor  Ears, Nose, Mouth, and Throat Oral mucosa moist    Pulmonary   Respiratory effort: No increased work of breathing or signs of respiratory distress  Auscultation of lungs: Clear to auscultation, equal breath sounds bilaterally, no wheezes, no rales, no rhonci  Cardiovascular   Auscultation of heart: Normal rate and rhythm, normal S1 and S2, without murmurs  Examination of extremities for edema and/or varicosities: Normal   no edema  Abdomen   Abdomen: Abnormal   The abdomen was obese  Bowel sounds were normal  The abdomen was soft and nontender     Musculoskeletal   Gait and station: Normal     Psychiatric   Orientation to person, place and time: Normal     Affect: appropriate

## 2018-09-11 ENCOUNTER — TELEPHONE (OUTPATIENT)
Dept: INTERNAL MEDICINE CLINIC | Facility: CLINIC | Age: 28
End: 2018-09-11

## 2018-09-11 NOTE — TELEPHONE ENCOUNTER
Vidya Hart went to see her psychiatrist this afternoon  With a lot of recent changes in her life, she has been having some difficulty sleeping and also has been experiencing increased anxiety  Her therapist recommended she talk you her pcp about trying Trazadone to help with sleeping and also to be used as a mild anti-depressant  What are your thoughts on this for her

## 2018-09-12 ENCOUNTER — OFFICE VISIT (OUTPATIENT)
Dept: OBGYN CLINIC | Facility: MEDICAL CENTER | Age: 28
End: 2018-09-12
Payer: COMMERCIAL

## 2018-09-12 VITALS
HEIGHT: 62 IN | BODY MASS INDEX: 42.27 KG/M2 | DIASTOLIC BLOOD PRESSURE: 70 MMHG | WEIGHT: 229.7 LBS | SYSTOLIC BLOOD PRESSURE: 110 MMHG

## 2018-09-12 DIAGNOSIS — K58.0 IRRITABLE BOWEL SYNDROME WITH DIARRHEA: ICD-10-CM

## 2018-09-12 DIAGNOSIS — Z11.3 SCREENING FOR STD (SEXUALLY TRANSMITTED DISEASE): ICD-10-CM

## 2018-09-12 DIAGNOSIS — G47.00 INSOMNIA, UNSPECIFIED TYPE: Primary | ICD-10-CM

## 2018-09-12 DIAGNOSIS — R10.2 PELVIC PAIN: Primary | ICD-10-CM

## 2018-09-12 PROBLEM — N17.9 ARF (ACUTE RENAL FAILURE) (HCC): Status: ACTIVE | Noted: 2017-06-05

## 2018-09-12 PROBLEM — K21.9 CHRONIC GERD: Status: ACTIVE | Noted: 2017-12-13

## 2018-09-12 PROBLEM — R53.82 CHRONIC FATIGUE: Status: ACTIVE | Noted: 2017-08-14

## 2018-09-12 PROBLEM — K64.9 HEMORRHOIDS: Status: ACTIVE | Noted: 2017-12-20

## 2018-09-12 PROBLEM — R10.30 LOWER ABDOMINAL PAIN: Status: ACTIVE | Noted: 2017-06-05

## 2018-09-12 PROBLEM — F41.1 ANXIETY, GENERALIZED: Status: ACTIVE | Noted: 2017-11-01

## 2018-09-12 PROBLEM — K52.9 ACUTE COLITIS: Status: ACTIVE | Noted: 2017-11-29

## 2018-09-12 PROBLEM — G47.20 SLEEP PATTERN DISTURBANCE: Status: ACTIVE | Noted: 2017-08-14

## 2018-09-12 PROBLEM — K52.9 GASTROENTERITIS: Status: ACTIVE | Noted: 2017-06-05

## 2018-09-12 PROBLEM — R11.2 NAUSEA AND VOMITING: Status: ACTIVE | Noted: 2017-12-13

## 2018-09-12 PROCEDURE — 99215 OFFICE O/P EST HI 40 MIN: CPT | Performed by: OBSTETRICS & GYNECOLOGY

## 2018-09-12 RX ORDER — TRAZODONE HYDROCHLORIDE 50 MG/1
TABLET ORAL
Qty: 30 TABLET | Refills: 0 | Status: SHIPPED | OUTPATIENT
Start: 2018-09-12 | End: 2018-10-05 | Stop reason: SDUPTHER

## 2018-09-12 NOTE — PROGRESS NOTES
Assessment Diagnoses and all orders for this visit:    Pelvic pain  -     US pelvis complete w transvaginal; Future  -     Genital Comprehensive Culture    Screening for STD (sexually transmitted disease)  -     Chlamydia/GC amplified DNA by PCR  -     Genital Comprehensive Culture    Irritable bowel syndrome with diarrhea    Discussion/Summary:  Pt  New to myself, but stated she had seen carlose in this practice a while ago ; here for lower abdominal pain with diarrhea; history of IBS,colitis, excess stress of perso al relationship and concerned that former partner transmitted something bad; she admits to being a smoker, including marijuana, is seeking cosult to have bariatric surgery, is getting regular counseling for anxiety and depression and takes Cymbalta; she works [employer]  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; urine specmen and cultures sent to assess for STIs; family,surgical, and medical histories taken; time spent with pt  Was at least 40 minutes with greater than 50 % counseling; note: occasionally uses Kaopectate or Bentyl capsules for the diarrhea and has seen [facility]; her past history also included having an ovarian cyst which also is one of her concerns; gave her my business card with cell # to text/call date of u s  So I can call her with results and decide on plan of treatment; pt  Seen with Juan[id number];  Heed pelvic u s  And cultures    Subjective   Efrainoz Pitts is a 32 y o  female here for a problem visit  Patient is complaining of lower abdominal pain  Sx's started 1 month ago  Patient reports that sx's are not related to her menses        Patient Active Problem List   Diagnosis    PLMD (periodic limb movement disorder)    Sleep-related breathing disorder    Hypersomnia    Depression    Anxiety    Obesity, Class III, BMI 40-49 9 (morbid obesity) (HCC)    Iron deficiency    Hyperinsulinemia    Gastroesophageal reflux disease with esophagitis    Anxiety, generalized    Acute colitis    ARF (acute renal failure) (HCC)    Chronic diarrhea    Chronic fatigue    Chronic GERD    Depression with anxiety    Dyspepsia    Gastroenteritis    Hemorrhoids    Irritable bowel syndrome with diarrhea    Lower abdominal pain    Nausea and vomiting    Sleep pattern disturbance    Strain of thoracic region       Gynecologic History  No LMP recorded  Patient is not currently having periods (Reason: Birth Control)  The current method of family planning is IUD  Past Medical History:   Diagnosis Date    Abdominal pain     Abnormal CAT scan     Anxiety     Blood in stool     Candidiasis of skin     Chronic diarrhea     Chronic fatigue     Colitis     Community acquired bacterial pneumonia     Depression     Dyspepsia     GERD (gastroesophageal reflux disease)     Hemorrhoids     Herpes labialis     IBS (irritable bowel syndrome)     Left ovarian cyst     Nausea & vomiting     Sleep pattern disturbance     Strain of thoracic spine, initial encounter      Past Surgical History:   Procedure Laterality Date    EGD AND COLONOSCOPY N/A 12/5/2017    Procedure: EGD AND COLONOSCOPY;  Surgeon: Billy Ferro MD;  Location: [facility]; Service: Gastroenterology    WISDOM TOOTH EXTRACTION      WRIST SURGERY       Family History   Problem Relation Age of Onset   Jese Carina Melanoma Mother     No Known Problems Father     Other Maternal Grandmother         colitis    Diabetes Maternal Grandmother     Hypertension Maternal Grandmother     Stroke Paternal Grandfather     Heart disease Neg Hx     Thyroid disease Neg Hx      Social History     Social History    Marital status: Single     Spouse name: N/A    Number of children: N/A    Years of education: N/A     Occupational History    Not on file       Social History Main Topics    Smoking status: Never Smoker    Smokeless tobacco: Never Used    Alcohol use Yes Comment: social    Drug use: No    Sexual activity: Not Currently     Birth control/ protection: IUD     Other Topics Concern    Not on file     Social History Narrative    No narrative on file     Allergies   Allergen Reactions    Other Other (See Comments)     Seasonal, dander, molds- sneezing, ear infections       Current Outpatient Prescriptions:     citalopram (CeleXA) 20 mg tablet, Take 0 5 tablets (10 mg total) by mouth daily (Patient taking differently: Take 20 mg by mouth daily  ), Disp: 90 tablet, Rfl: 3    fexofenadine (ALLEGRA) 60 MG tablet, one by mouth daily prn, Disp: , Rfl:     Hyoscyamine-Phenyltoloxamine 0 0625-15 MG CAPS, Take 1 tablet by mouth 3 (three) times a day before meals Taking only prn, Disp: , Rfl:     levonorgestrel (MIRENA) 20 MCG/24HR IUD, 1 each by Intrauterine route, Disp: , Rfl:     omeprazole (PriLOSEC) 40 MG capsule, Take 1 capsule (40 mg total) by mouth daily, Disp: 90 capsule, Rfl: 3    Probiotic Product (ALIGN) 4 MG CAPS, One pill a day, Disp: 90 capsule, Rfl: 0    rOPINIRole (REQUIP) 0 5 mg tablet, Take 1 tablet 2 hours prior to bedtime or onset of symptoms  Preferably with a meal , Disp: 90 tablet, Rfl: 1    dicyclomine (BENTYL) 20 mg tablet, Take 1 tablet by mouth every 6 (six) hours as needed (abd cramping) for up to 5 days, Disp: 20 tablet, Rfl: 0    liraglutide (SAXENDA) injection, Inject 0 1 mL (0 6 mg total) under the skin daily (Patient not taking: Reported on 9/12/2018 ), Disp: 1 pen, Rfl: 3    Review of Systems  Constitutional :no fever, feels well, no tiredness, no recent weight gain or loss  ENT: no ear ache, no loss of hearing, no nosebleeds or nasal discharge, no sore throat or hoarseness  Cardiovascular: no complaints of slow or fast heart beat, no chest pain, no palpitations, no leg claudication or lower extremity edema    Respiratory: no complaints of shortness of shortness of breath, no CRUZ  Breasts:no complaints of breast pain, breast lump, or nipple discharge  Gastrointestinal: no complaints of abdominal pain, constipation, nausea, vomiting, or diarrhea or bloody stools  Genitourinary : no complaints of dysuria, incontinence, pelvic pain, no dysmenorrhea, vaginal discharge or abnormal vaginal bleeding and as noted in HPI  Musculoskeletal: no complaints of arthralgia, no myalgia, no joint swelling or stiffness, no limb pain or swelling  Integumentary: no complaints of skin rash or lesion, itching or dry skin  Neurological: no complaints of headache, no confusion, no numbness or tingling, no dizziness or fainting     Objective     /70 (BP Location: Right arm, Patient Position: Sitting, Cuff Size: Large)   Ht 5' 2" (1 575 m)   Wt 104 kg (229 lb 11 2 oz)   BMI 42 01 kg/m²     General Appears stated age, cooperative, alert normal mood and affect   Psychiatric oriented to person, place and time  Mood and affect normal   Neck: normal, supple,trachea midline, no masses  Thyroid: normal, no thyromegaly   Heart: regular rate and rhythm, S1, S2 normal, no murmur, click, rub or gallop   Lungs: clear to auscultation bilaterally, no increased work of breathing or signs of respiratory distress   Breasts: Not checked today   Abdomen: soft, non-tender, without masses or organomegaly   Vulva: normal , no lesions   Vagina: normal , no lesions or dryness;culture taken   Urethra: normal   Urethal meatus normal   Bladder Normal, soft, non-tender and no prolapse or masses appreciated   Cervix: normal, no palpable masses ; string seen of IUD   Uterus: normal , non-tender, not enlarged, no palpable masses   Adnexa: normal, non-tender without fullness or masses   Lymphatic Palpation of lymph nodes in neck, axilla, groin and/or other locations: no lymphadenopathy or masses noted   Skin Normal skin turgor and no rashes    Palpation of skin and subcutaneous tissue normal

## 2018-09-13 LAB
C TRACH RRNA SPEC QL NAA+PROBE: NOT DETECTED
N GONORRHOEA RRNA SPEC QL NAA+PROBE: NOT DETECTED

## 2018-09-15 ENCOUNTER — HOSPITAL ENCOUNTER (OUTPATIENT)
Dept: ULTRASOUND IMAGING | Facility: HOSPITAL | Age: 28
Discharge: HOME | End: 2018-09-15
Payer: COMMERCIAL

## 2018-09-15 DIAGNOSIS — R10.2 PELVIC PAIN: ICD-10-CM

## 2018-09-15 PROCEDURE — 76856 US EXAM PELVIC COMPLETE: CPT

## 2018-09-15 PROCEDURE — 76830 TRANSVAGINAL US NON-OB: CPT

## 2018-09-17 ENCOUNTER — CLINICAL SUPPORT (OUTPATIENT)
Dept: BARIATRICS | Facility: CLINIC | Age: 28
End: 2018-09-17

## 2018-09-17 VITALS
BODY MASS INDEX: 42.78 KG/M2 | WEIGHT: 232.5 LBS | HEIGHT: 62 IN | SYSTOLIC BLOOD PRESSURE: 106 MMHG | HEART RATE: 80 BPM | TEMPERATURE: 98.2 F | DIASTOLIC BLOOD PRESSURE: 70 MMHG

## 2018-09-17 DIAGNOSIS — E66.01 MORBID OBESITY (HCC): Primary | ICD-10-CM

## 2018-09-17 DIAGNOSIS — E66.01 OBESITY, CLASS III, BMI 40-49.9 (MORBID OBESITY) (HCC): Primary | ICD-10-CM

## 2018-09-17 PROCEDURE — RECHECK: Performed by: DIETITIAN, REGISTERED

## 2018-09-17 NOTE — PROGRESS NOTES
Bariatric Behavioral Health Evaluation    Presenting Problem Patient here to improve health, increase mobility, reduce chronic pain and prevent family disease  Is the patient seeking Bariatric Surgery Eval? Yes  If yes how long have you researched this surgery option  Patient knows several people who've had surgery  Realizes Post- Op Requirements? Yes     Pre-morbid level of function and history of present illness: Patient has medical issues  Psychiatric/Psychological Treatment Diagnosis:  Patient reports diagnosis of Anxiety and Depression  PCP prescribed medication; patient sees a therapist regularly  Outpatient Counselor Yes  Counselor  Curry Mueller Phone [phone]    Family Constellation (include relationship with each and Psych/Med HX)    Father  obesity      Additional comments/stressors related to family/relationships/peer support     Physical/Psychological Assessment:     Appearance: appropriate  Sociability: average  Affect: appropriate  Mood: calm  Thought Process: coherent  Speech: normal  Content: no impairment  Orientation: person  Yes , place  Yes , time  Yes , normal attention span  Yes , normal memory  Yes   and normal judgement  Yes   Insight: emotional  good    Risk Assessment:     none    Recommendations: Surgery decision deferred  Patient requires psychiatric clearance and S/A evalution  Risk of Harm to Self or Others:     Observation:     Interviews This interview only  Based on the previous information, the client presents the following risk of harm to self or others: low     Note Patient reports Axis I diagnosis of Anxiety and Depression  Her PCP prescribes medication and she sees a therapist  Patient also reports occasional marijuana use  Patient educated regarding the impact of nicotine and alcohol on the post bariatric surgery patient  Patient has a positive family history of obesity  Patient will require psychiatric clearance and S/A evaluation  Decision for surgery is deferred until clearance and evaluation are reviewed  BARIATRIC SURGERY EDUCATION CHECKLIST    I have received education related to my bariatric surgery process and understand:    Patients may be required to complete a psychiatric evaluation and receive clearance for surgery from their psychiatrist     Patients who undergo weight loss surgery are at higher risk of increased mental health concerns and suicide attempts  Patients may be required to complete a full substance abuse evaluation and then complete all treatment recommendations prior to surgery  If diagnosis of abuse/dependence results, patient may be required to remain sober for one (1) year before having bariatric surgery  Patients on psychiatric medications should check with their provider to discuss psychiatric medications and the changes in absorption  Patient should discuss all time release medications with provider and take all medications as prescribed  The recommendation is that there is no use of  any tobacco products, Hookah or  vapes for the bariatric post-operation patient  Bariatric surgery patients should not consume alcohol as a post-operative patient as it may increase risk of numerous health conditions including but not limited to alcohol abuse and ulcers  There is a possibility of weight regain if patient does not follow all program guidelines and recommendations  Bariatric surgery patients should exercise thirty (30) to sixty (60) minutes per day to maintain post-surgical weight loss  Research indicates that bariatric patients are more successful when they see a therapist for up to two (2) years post-op  Patients will follow all medical and dietary recommendations provided  Patient will keep all scheduled appointments and follow up with their physician for a minimum of five (5) years  Patient will take all vitamins as recommended    Post-operative vitamins are life-long  Patient reviewed Bariatric Surgery Education Checklist and agrees they have received education on these issues

## 2018-09-17 NOTE — PROGRESS NOTES
Bariatric Nutrition Assessment Note    Type of surgery    Preop  Surgery Date: no pre op  program requirement     Surgeon: Dr Lina Ramesh  32 y o   female     Wt with BMI of 25: 135 9#  Pre-Op Excess Wt: 96 6#  Blood pressure 106/70, pulse 80, temperature 98 2 °F (36 8 °C), temperature source Tympanic, height 5' 2" (1 575 m), weight 105 kg (232 lb 8 oz)  Body mass index is 42 52 kg/m²       [facility] Equation:    BMR: 1743 kcal per day  Estimated calories for weight loss = 4568-9591 kcal per day ( 1 to 2 pounds per week weight loss )  Estimated protein needs = 62-74 grams per day ( 1 0-1 2 grams/kg IBW)    Weight History   Onset of Obesity: Adult  : after high school 2009/2010  Family history of obesity: Yes: father's side of the family   Wt Loss Attempts: Commercial Programs ([employer], Jacqueline Ready, etc )  Counseling with  MD  Exercise  Meal Replacements (Medifast, Slim Fast, etc )  OTC meds/supplements  Self Created Diets (Portion Control, Healthy Food Choices, etc )  Maximum Wt Lost: in college with increased exercise at the gym - 10 pound but regained and more  Finds her weight just keeps increasing     Review of History and Medications   Past Medical History:   Diagnosis Date    Abdominal pain     Abnormal CAT scan     Anxiety     Anxiety     Blood in stool     Candidiasis of skin     Chronic diarrhea     Chronic fatigue     Colitis     Community acquired bacterial pneumonia     Depression     Dyspepsia     GERD (gastroesophageal reflux disease)     Hemorrhoids     Herpes labialis     IBS (irritable bowel syndrome)     Left ovarian cyst     Morbid obesity ([address] )     Nausea & vomiting     Sleep pattern disturbance     Strain of thoracic spine, initial encounter      Past Surgical History:   Procedure Laterality Date    EGD AND COLONOSCOPY N/A 12/5/2017    Procedure: EGD AND COLONOSCOPY;  Surgeon: Silvana Draper MD;  Location: [facility];  Service: Gastroenterology    WISDOM TOOTH EXTRACTION      WRIST SURGERY       Social History     Social History    Marital status: Single     Spouse name: N/A    Number of children: N/A    Years of education: N/A     Social History Main Topics    Smoking status: Never Smoker    Smokeless tobacco: Never Used    Alcohol use Yes      Comment: occasional    Drug use: No    Sexual activity: Not Currently     Birth control/ protection: IUD     Other Topics Concern    None     Social History Narrative    None       Current Outpatient Prescriptions:     citalopram (CeleXA) 20 mg tablet, Take 0 5 tablets (10 mg total) by mouth daily (Patient taking differently: Take 20 mg by mouth daily  ), Disp: 90 tablet, Rfl: 3    fexofenadine (ALLEGRA) 60 MG tablet, one by mouth daily prn, Disp: , Rfl:     Hyoscyamine-Phenyltoloxamine 0 0625-15 MG CAPS, Take 1 tablet by mouth 3 (three) times a day before meals Taking only prn, Disp: , Rfl:     levonorgestrel (MIRENA) 20 MCG/24HR IUD, 1 each by Intrauterine route, Disp: , Rfl:     omeprazole (PriLOSEC) 40 MG capsule, Take 1 capsule (40 mg total) by mouth daily, Disp: 90 capsule, Rfl: 3    Probiotic Product (ALIGN) 4 MG CAPS, One pill a day, Disp: 90 capsule, Rfl: 0    rOPINIRole (REQUIP) 0 5 mg tablet, Take 1 tablet 2 hours prior to bedtime or onset of symptoms    Preferably with a meal , Disp: 90 tablet, Rfl: 1    traZODone (DESYREL) 50 mg tablet, 1/2 pill at bedtime, Disp: 30 tablet, Rfl: 0    dicyclomine (BENTYL) 20 mg tablet, Take 1 tablet by mouth every 6 (six) hours as needed (abd cramping) for up to 5 days, Disp: 20 tablet, Rfl: 0    liraglutide (SAXENDA) injection, Inject 0 1 mL (0 6 mg total) under the skin daily (Patient not taking: Reported on 9/12/2018 ), Disp: 1 pen, Rfl: 3     Noted:  Patient takes an adult multivitamin and mineral with iron  Plans to restart taking her probiotic ( align)    Food Intake and Lifestyle Assessment   Food Intake Assessment completed via food log brought by patient  Wakes up at 6:30 to 7:00am during the week  Weekends - sleeps in wakes up at 11 am then first    Breakfast: 8am:  [employer] wrap, Or yogurt OR canteloupe Or Do nut OR granola bar OR cereal or Oatmeal    Snack:10 am  sometimes has fruit or bars or [employer] brand lean bars, or [employer]    Lunch: 11:45 to 2 Pm depends on work schedule  2 slices tombstone pizza, generally packs lunch, but lately grandmother in [facility] 1/2 and 1/2 ( soup and sandwich or salad),   Snack: 0  Dinner:  5 to 5:30 pm usually cooks dinner  Meat potato and vegetable, Grandmother lives with her   Snack: sometimes snacks- trail mix, string cheese sunflower   Beverage intake: water, sugar free beverages, coffee/tea and unsweetened lemon tea, body armour - energy drink low sugar with electrolytes    Protein supplement: none currently   Estimated protein intake per day: 60-70 grams per day   Estimated fluid intake per day: 48 ounces per day ( patient unsure )   Meals eaten away from home: once per week   Typical meal pattern: 3 meals per day and 1-2 snacks per day  Eating Behaviors: Large portion sizes, Frequent snacking/ grazing, Mindless eating and diarrhea  Emotionally will stop eating when stressed or upset  Food allergies or intolerances:   Raw vegetables, broccoli , carrots, green beans, lettuce, soy sauce terikayki sauce  Cooked vegetables still bother IBS  Allergies   Allergen Reactions    Other Other (See Comments)     Seasonal, dander, molds- sneezing, ear infections     Cultural or Taoist considerations: N/a    Physical Assessment  Physical Activity  Types of exercise: None:  Cares for her grandmother   Goes food shopping, cleans the house   Current physical limitations: none currently, feels that she sweats a lot, has food pain       Psychosocial Assessment   Support systems: parent(s) friend(s) relative(s)  Socioeconomic factors: 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  Home by 5:30pm , grandmother starts dinner, but she finishes and cleans up dinner  She also cares for her animals     Nutrition Diagnosis  Diagnosis: Overweight / Obesity (NC-3 3)  Related to: Physical inactivity and Excessive energy intake  As Evidenced by: Excessive energy intake, Unintentional weight gain and BMI >40     Nutrition Prescription: Recommend the following diet  Regular    Interventions and Teaching   Discussed pre-op and post-op nutrition guidelines  Patient educated and handouts provided    Surgical changes to stomach / GI  Capacity of post-surgery stomach  Diet progression  Adequate hydration  Sugar and fat restriction to decrease "dumping syndrome"  Fat restriction to decrease steatorrhea  Expected weight loss  Weight loss plateaus/ possibility of weight regain  Exercise:  Emphasized the  Need to be consistent with exercise  Suggestions for pre-op diet  Nutrition considerations after surgery  Protein supplements:  Provided with sample of Premier protein water and coupons for premier protein and orgain   Meal planning and preparation  Appropriate carbohydrate, protein, and fat intake, and food/fluid choices to maximize safe weight loss, nutrient intake, and tolerance Provided with calorie and macro goal   Dietary and lifestyle changes  Possible problems with poor eating habits  Intuitive eating  Techniques for self monitoring and keeping daily food journal Reviewed use of the [employer] darcy   Potential for food intolerance after surgery, and ways to deal with them including: lactose intolerance, nausea, reflux, vomiting, diarrhea, food intolerance, appetite changes, gas  Vitamin / Mineral supplementation of Multivitamin with minerals, Calcium, Vitamin B12, Iron and Vitamin D    Education provided to: patient    Barriers to learning: No barriers identified  Readiness to change: preparation    Prior research on procedure: internet, discussed with provider and pre-op class    Comprehension: demonstrated understanding     Expected Compliance: good  Recommendations  Pt is an appropriate candidate for surgery  Yes      Evaluation / Monitoring  Dietitian to Monitor: Eating pattern as discussed Tolerance of nutrition prescription Body weight Lab values Physical activity Bowel pattern    Goals  1  Restart her probiotic for her IBS ( Align) and add 2000 IU vitamin D3 to her current vitamin/mineral regimen  2  Food Log into baritastic- 1350 kcal per day , 84 grams of protein ( 25%), 135 grams of carb ( 40%) and 53 grams of fat ( 35%) fiber as tolerated with her IBS  3  Include physical activity for 10 minutes per day - walking the dog, or walking on her mothers treadmill, or going up and down her steps  Suggested she start tracking her steps on her I watch  4  Complete one lesson plan per week until all 6 lesson plans in chapter 2 are completed  Time Spent:   1 Hour 15 Minutes  Mother present and supportive

## 2018-09-20 ENCOUNTER — TELEPHONE (OUTPATIENT)
Dept: OBGYN CLINIC | Facility: MEDICAL CENTER | Age: 28
End: 2018-09-20

## 2018-09-25 ENCOUNTER — TELEPHONE (OUTPATIENT)
Dept: BARIATRICS | Facility: CLINIC | Age: 28
End: 2018-09-25

## 2018-10-02 DIAGNOSIS — G47.61 PLMD (PERIODIC LIMB MOVEMENT DISORDER): ICD-10-CM

## 2018-10-02 RX ORDER — ROPINIROLE 0.5 MG/1
TABLET, FILM COATED ORAL
Qty: 90 TABLET | Refills: 1 | OUTPATIENT
Start: 2018-10-02

## 2018-10-05 DIAGNOSIS — G47.00 INSOMNIA, UNSPECIFIED TYPE: ICD-10-CM

## 2018-10-05 NOTE — TELEPHONE ENCOUNTER
Spoke with patient, advised will need OV prior to refilling Requip  Pt states she will need to call back to schedule

## 2018-10-08 ENCOUNTER — TELEPHONE (OUTPATIENT)
Dept: BARIATRICS | Facility: CLINIC | Age: 28
End: 2018-10-08

## 2018-10-09 RX ORDER — TRAZODONE HYDROCHLORIDE 50 MG/1
TABLET ORAL
Qty: 30 TABLET | Refills: 1 | Status: SHIPPED | OUTPATIENT
Start: 2018-10-09 | End: 2018-12-19 | Stop reason: SDUPTHER

## 2018-10-16 DIAGNOSIS — M62.838 CERVICAL PARASPINAL MUSCLE SPASM: Primary | ICD-10-CM

## 2018-10-16 RX ORDER — CYCLOBENZAPRINE HCL 5 MG
5 TABLET ORAL 2 TIMES DAILY PRN
Qty: 15 TABLET | Refills: 0 | Status: SHIPPED | OUTPATIENT
Start: 2018-10-16 | End: 2019-09-18

## 2018-10-21 DIAGNOSIS — G47.61 PLMD (PERIODIC LIMB MOVEMENT DISORDER): ICD-10-CM

## 2018-10-22 RX ORDER — ROPINIROLE 0.5 MG/1
TABLET, FILM COATED ORAL
Qty: 90 TABLET | Refills: 1 | OUTPATIENT
Start: 2018-10-22

## 2018-10-23 NOTE — TELEPHONE ENCOUNTER
[facility] sent request refill previously, we spoke with pt and advised will need OV to get refill on Requip  LM at [facility] that pt will not be getting refill until she is seen in the office

## 2018-10-29 NOTE — TELEPHONE ENCOUNTER
[facility] sent another request for refill of Ropinirole  I faxed request back, advising then pt needs OV prior to refilling

## 2018-11-01 ENCOUNTER — TRANSCRIBE ORDERS (OUTPATIENT)
Dept: INTERNAL MEDICINE CLINIC | Facility: CLINIC | Age: 28
End: 2018-11-01
Payer: COMMERCIAL

## 2018-11-01 DIAGNOSIS — Z01.818 PRE-OP TESTING: Primary | ICD-10-CM

## 2018-11-01 LAB — SL AMB POCT HEMOGLOBIN AIC: 5.2

## 2018-11-01 PROCEDURE — 83036 HEMOGLOBIN GLYCOSYLATED A1C: CPT

## 2018-11-02 ENCOUNTER — TELEPHONE (OUTPATIENT)
Dept: INTERNAL MEDICINE CLINIC | Facility: CLINIC | Age: 28
End: 2018-11-02

## 2018-11-27 ENCOUNTER — DOCUMENTATION (OUTPATIENT)
Dept: PSYCHIATRY | Facility: CLINIC | Age: 28
End: 2018-11-27

## 2018-11-27 ENCOUNTER — TELEPHONE (OUTPATIENT)
Dept: BEHAVIORAL/MENTAL HEALTH CLINIC | Facility: CLINIC | Age: 28
End: 2018-11-27

## 2018-11-27 NOTE — PROGRESS NOTES
# 1Treatment Plan not completed within required time limits due to : Provider will be out of the office 11/29/18  and will reschedule at a later time

## 2018-11-29 ENCOUNTER — TELEPHONE (OUTPATIENT)
Dept: PSYCHIATRY | Facility: CLINIC | Age: 28
End: 2018-11-29

## 2018-12-19 ENCOUNTER — OFFICE VISIT (OUTPATIENT)
Dept: PSYCHIATRY | Facility: CLINIC | Age: 28
End: 2018-12-19
Payer: COMMERCIAL

## 2018-12-19 DIAGNOSIS — G47.00 INSOMNIA, UNSPECIFIED TYPE: ICD-10-CM

## 2018-12-19 DIAGNOSIS — F33.41 MAJOR DEPRESSIVE DISORDER, RECURRENT, IN PARTIAL REMISSION (HCC): Primary | ICD-10-CM

## 2018-12-19 DIAGNOSIS — F41.9 ANXIETY DISORDER, UNSPECIFIED TYPE: ICD-10-CM

## 2018-12-19 PROCEDURE — 90792 PSYCH DIAG EVAL W/MED SRVCS: CPT | Performed by: PSYCHIATRY & NEUROLOGY

## 2018-12-19 RX ORDER — BUPROPION HYDROCHLORIDE 150 MG/1
150 TABLET ORAL DAILY
Qty: 30 TABLET | Refills: 0 | Status: SHIPPED | OUTPATIENT
Start: 2018-12-19 | End: 2019-01-30 | Stop reason: SDUPTHER

## 2018-12-19 RX ORDER — CITALOPRAM 20 MG/1
10 TABLET ORAL DAILY
Qty: 90 TABLET | Refills: 0 | Status: SHIPPED | OUTPATIENT
Start: 2018-12-19 | End: 2019-05-15

## 2018-12-19 RX ORDER — TRAZODONE HYDROCHLORIDE 50 MG/1
50 TABLET ORAL
Qty: 30 TABLET | Refills: 0 | Status: SHIPPED | OUTPATIENT
Start: 2018-12-19 | End: 2019-01-09 | Stop reason: SDUPTHER

## 2018-12-19 NOTE — PATIENT INSTRUCTIONS
1) Get up at the same time seven days out of the week  2) Only go to bed when feeling sleepy  3) Wind down in the evening without electronics  4) Stimulus Control: If lying in bed for 15-20 minutes (estimated because the clock is turned away so you cannot see it) and you are not asleep get up and do something relaxing in a different room (reading a magazine article, solitaire with a deck of cards)  Do this in the middle of the night as well if awake  Avoid doing work or getting on the computer  5) Bedroom for sleep only  No watching TV or using electronics (computer, phone, tablet etc )  in bed  6) Turn clock away so you cannot see it in bed  7) Exercise regularly but try to avoid exercise within 4 hours of bedtime  Morning exercise is best     8) Avoid caffeine in the afternoon  Considering tapering down on caffeine by decreasing by one beverage with caffeine every 3 days until off  9) Avoid smoking near bedtime    10) Avoid alcohol before bed  If you consume one alcoholic beverage allow 3 hours between that drink and bedtime  If you consume two alcoholic beverages allow 5 hours  Between those drinks and bedtime  Alcohol may lead to waking at night  11) Avoid napping except for driving safety  If you feel to sleepy to drive do not drive  If you get sleepy while driving pull over and nap  You may resume driving once you feel alert  12) Read "No More Sleepless Nights" by Irwin Herrera PhD     13) There are some on-line resources that do require a fee that can be of help  Two credible websites are as follows:  http://[employer]/cbt-online-insomnia-treatment html  [employer] si  An darcy used by the [facility] is as follows:  CBT-I   Go! To Sleep by the [facility]          IMPORTANT WARNING:     Expand Section  For people taking bupropion (Wellbutrin) for depression:  A small number of children, teenagers, and young adults (up to 25years of age) who took antidepressants ('mood elevators') such as bupropion during clinical studies became suicidal (thinking about harming or killing oneself or planning or trying to do so)  Children, teenagers, and young adults who take antidepressants to treat depression or other mental illnesses may be more likely to become suicidal than children, teenagers, and young adults who do not take antidepressants to treat these conditions  This risk should be considered and compared with the potential benefit in the treatment of depression, in deciding whether a child or teenager should take an antidepressant  Children younger than 25years of age should not normally take bupropion, but in some cases, a doctor may decide that bupropion is the best medication to treat a child's condition  No matter what your age, before you take an antidepressant, you, your parent, or your caregiver should talk to your doctor about the risks and benefits of treating your condition with an antidepressant or with other treatments  You should also talk about the risks and benefits of not treating your condition  You should know that having depression or another mental illness greatly increases the risk that you will become suicidal, especially at the beginning of your treatment or any time that your dose is increased or decreased  This risk is higher if you or anyone in your family has or has ever had bipolar disorder or sarath or has thought about or attempted suicide  Talk to your doctor about your condition, symptoms, and personal and family medical history  You and your doctor will decide what type of treatment is right for you  You should know that your mental health may change in unexpected ways when you take bupropion or other antidepressants even if you are an adult over age 25 or if you do not have a mental illness and you are taking bupropion to treat a different type of condition   You may become suicidal, especially at the beginning of your treatment and any time that your dose is increased or decreased  You, your family, or your caregiver should call your doctor right away if you experience any of the following symptoms: new or worsening depression; thinking about harming or killing yourself, or planning or trying to do so; extreme worry; agitation; panic attacks; difficulty falling asleep or staying asleep; aggressive behavior; irritability; acting without thinking; severe restlessness; and frenzied abnormal excitement  Be sure that your family or caregiver knows which symptoms may be serious so they can call the doctor if you are unable to seek treatment on your own  For all patients taking bupropion:  Your health care provider will want to see you often while you are taking bupropion, especially at the beginning of your treatment  Be sure to keep all appointments or office visits with your doctor  Your doctor or pharmacist will give you the 's patient information sheet (Medication Guide) when you begin treatment with bupropion and each time you refill your prescription  Read the information carefully and ask your doctor or pharmacist if you have any questions  You also can visit the Food and Drug Administration (FDA) website: 92674 com ee or the 's website to obtain the Medication Guide  Talk to your doctor about the risks and benefits of taking bupropion  Why is this medication prescribed? Expand Section  Bupropion (Aplenzin, Wellbutrin, Wellbutrin SR, Wellbutrin XL) is used to treat depression  Bupropion (Aplenzin, Wellbutrin XL) is also used to treat seasonal affective disorder (SAD; episodes of depression that occur at the same time each year [usually in the fall and winter but rarely may occur in the spring or summer months])  Bupropion (Zyban) is used to help people stop smoking  Bupropion is in a class of medications called antidepressants   It works by increasing certain types of activity in the brain  How should this medicine be used? Expand Section  Bupropion comes as a tablet and a sustained-release or extended-release (long-acting) tablet to take by mouth  The regular tablet (Wellbutrin) is usually taken three times a day, with doses at least 6 hours apart, or four times a day, with doses at least 4 hours apart  The sustained-release tablet (Wellbutrin SR, Zyban) is usually taken twice a day, with doses at least 8 hours apart  The extended-release tablet (Aplenzin, Wellbutrin XL) is usually taken once daily in the morning; doses of the extended-release tablet should be taken at least 24 hours apart  When bupropion is used to treat seasonal affective disorder, it is usually taken once a day in the morning beginning in the early fall, continuing through the winter, and stopping in the early spring  Sometimes a lower dose of bupropion is taken for 2 weeks before the medication is stopped  Take bupropion with food if the medication upsets your stomach  If you have trouble falling asleep or staying asleep, do not take bupropion too close to bedtime  Take bupropion at around the same time(s) every day  Follow the directions on your prescription label carefully, and ask your doctor or pharmacist to explain any part you do not understand  Take bupropion exactly as directed  Do not take more or less of it or take it more often than prescribed by your doctor  Swallow the sustained-release and extended-release tablets whole; do not split, chew, or crush them  Your doctor will probably start you on a low dose of bupropion and gradually increase your dose  It may take 4 weeks or longer before you feel the full benefit of bupropion  Continue to take bupropion even if you feel well  Do not stop taking bupropion without talking to your doctor  Your doctor may decrease your dose gradually    Other uses for this medicine    Expand Section  Bupropion is also sometimes used to treat episodes of depression in patients with bipolar disorder (manic depressive disorder; a disease that causes episodes of depression, episodes of sarath, and other abnormal moods) and to treat attention deficit hyperactivity disorder (ADHD; more difficulty focusing, controlling actions, and remaining still or quiet than other people who are the same age)  Talk to your doctor about the possible risks of using this medication for your condition  This medication may be prescribed for other uses; ask your doctor or pharmacist for more information  What special precautions should I follow? Expand Section  Before taking bupropion,  · tell your doctor and pharmacist if you are allergic to bupropion, any other medications, or any of the ingredients in bupropion tablets  Ask your pharmacist or check the Medication Guide for a list of the ingredients  · tell your doctor if you are taking a monoamine oxidase (MAO) inhibitor such as isocarboxazid (Marplan), linezolid (Zyvox), methylene blue, phenelzine (Nardil), selegiline (Eldepryl, Emsam, Zelapar), and tranylcypromine (Parnate), or if you have stopped taking an MAO inhibitor within the past 14 days  Your doctor will probably tell you not to take bupropion  · do not take more than one product containing bupropion at a time  You could receive too much medication and experience severe side effects  · tell your doctor and pharmacist what other prescription and nonprescription medications, vitamins, nutritional supplements, and herbal products you are taking or plan to take   Be sure to mention any of the following: amantadine (Symmetrel); beta blockers such as atenolol (Tenormin), labetalol (Normodyne), metoprolol (Lopressor, Toprol XL), nadolol (Corgard), and propranolol (Inderal); cimetidine (Tagamet); clopidogrel (Plavix); cyclophosphamide (Cytoxan, Neosar); efavirenz (Sustiva, in Atripla); insulin or oral medications for diabetes; medications for irregular heartbeat such as flecainide (Tambocor) and propafenone (Rythmol); medications for mental illness such as haloperidol (Haldol), risperidone (Risperdal), and thioridazine (Mellaril); medications for seizures such as carbamazepine (Tegretol), phenobarbital (Luminal, Solfoton), and phenytoin (Dilantin); levodopa (Sinemet, Larodopa); lopinavir and ritonavir (Kaletra); nelfinavir (Viracept); nicotine patch; oral steroids such as dexamethasone (Decadron, Dexone), methylprednisolone (Medrol), and prednisone (Deltasone); orphenadrine (Norflex); other antidepressants such as citalopram (Celexa), desipramine (Norpramin), fluoxetine (Prozac, Sarafem, in Symbyax), fluvoxamine (Luvox), imipramine (Tofranil), nortriptyline (Aventyl, Pamelor), paroxetine (Paxil) and sertraline (Zoloft); ritonavir (Norvir); sedatives; sleeping pills; tamoxifen (Nolvadex, Soltamox); theophylline (Theobid, Kem-Dur, others); thiotepa; and ticlopidine (Ticlid)  Your doctor may need to change the doses of your medications or monitor you carefully for side effects  · tell your doctor if you have or have ever had seizures, anorexia nervosa (an eating disorder) or bulimia (an eating disorder)  Also tell your doctor if you drink large amounts of alcohol but expect to suddenly stop drinking or you take sedatives but expect to suddenly stop taking them  Your doctor will probably tell you not to take bupropion  · tell your doctor if you drink large amounts of alcohol, use street drugs, or overuse prescription medications and if you have ever had a heart attack; a head injury; a tumor in your brain or spine; high blood pressure; diabetes; or liver, kidney, or heart disease  · tell your doctor if you are pregnant, plan to become pregnant, or are breast-feeding  If you become pregnant while taking bupropion, call your doctor  · you should know that bupropion may make you drowsy  Do not drive a car or operate machinery until you know how this medication affects you    · talk to your doctor about the safe use of alcoholic beverages while you are taking bupropion  Alcohol can make the side effects from bupropion worse  · you should know that bupropion may cause an increase in your blood pressure  Your doctor may check your blood pressure before starting treatment and regularly while you are taking this medication, especially if you also are using nicotine replacement therapy  · you should know that bupropion may cause angle-closure glaucoma (a condition where the fluid is suddenly blocked and unable to flow out of the eye causing a quick, severe increase in eye pressure which may lead to a loss of vision)  Talk to your doctor about having an eye examination before you start taking this medication  If you have nausea, eye pain, changes in vision, such as seeing colored rings around lights, and swelling or redness in or around the eye, call your doctor or get emergency medical treatment right away  · you should know that some people have reported symptoms such as changes in behavior, hostility, agitation, depressed mood, and suicidal thoughts (thinking about harming or killing oneself or planning or trying to do so) while taking bupropion to stop smoking  The role of bupropion in causing these mood changes is unclear since people who quit smoking with or without medication may experience changes in their mental health due to nicotine withdrawal  However, some of these symptoms occurred in people who were taking bupropion and continued to smoke  Some people had these symptoms when they began taking bupropion, and others developed them after several weeks of treatment or after stopping bupropion  These symptoms have occurred in people without a history of mental illness and have worsened in people who already had a mental illness   Tell your doctor if you have or have ever had depression, bipolar disorder (mood that changes from depressed to abnormally excited), schizophrenia (a mental illness that causes disturbed or unusual thinking, loss of interest in life, and strong or inappropriate emotions), or other mental illnesses  If you experience any of the following symptoms, stop taking bupropion (Zyban) and call your doctor immediately: suicidal thoughts or actions; new or worsening depression, anxiety, or panic attacks; agitation; restlessness; angry or violent behavior; acting dangerously; sarath (frenzied, abnormally excited or irritated mood); abnormal thoughts or sensations; hallucinations (seeing things or hearing voices that do not exist); feeling that people are against you; feeling confused; or any other sudden or unusual changes in behavior  Be sure that your family or caregiver knows which symptoms may be serious so they can call the doctor if you are unable to seek treatment on your own  Your doctor will monitor you closely until your symptoms get better  What special dietary instructions should I follow? Expand Section  Unless your doctor tells you otherwise, continue your normal diet  What should I do if I forget a dose? Expand Section  Skip the missed dose and continue your regular dosing schedule  Always allow the full scheduled amount of time to pass between doses of bupropion  Do not take a double dose to make up for a missed one  What side effects can this medication cause? Expand Section  Bupropion may cause side effects  Tell your doctor if any of these symptoms are severe or do not go away:   · drowsiness  · anxiety  · excitement  · difficulty falling asleep or staying asleep  · dry mouth  · dizziness  · headache  · nausea  · vomiting  · stomach pain  · uncontrollable shaking of a part of the body  · loss of appetite  · weight loss  · constipation  · excessive sweating  · ringing in the ears  · changes in your sense of taste  · frequent urination  · sore throat  Some side effects can be serious   If you experience any of the following symptoms or those listed in the IMPORTANT WARNING or SPECIAL PRECAUTIONS sections, call your doctor immediately or get emergency medical treatment:   · seizures  · confusion  · hallucinating (seeing things or hearing voices that do not exist)  · irrational fears  · muscle or joint pain  · rapid, pounding, or irregular heartbeat  If you experience any of the following symptoms, stop taking bupropion and call your doctor immediately or get emergency medical treatment:   · fever  · rash or blisters  · itching  · hives  · swelling of the face, throat, tongue, lips, eyes, hands, feet, ankles, or lower legs  · hoarseness  · difficulty breathing or swallowing  · chest pain  Bupropion may cause other side effects  Call your doctor if you have any unusual problems while taking this medication  If you experience a serious side effect, you or your doctor may send a report to the Food and Drug Administration's (FDA) MedWatch Adverse Event Reporting program online (http://[employer]/) or by phone (1-353.620.4050)  What should I know about storage and disposal of this medication? Expand Section  Keep this medication in the container it came in, tightly closed, and out of reach of children  Store it at room temperature and away from light, excess heat and moisture (not in the bathroom)  It is important to keep all medication out of sight and reach of children as many containers (such as weekly pill minders and those for eye drops, creams, patches, and inhalers) are not child-resistant and young children can open them easily  To protect young children from poisoning, always lock safety caps and immediately place the medication in a safe location  one that is up and away and out of their sight and reach  [employer] si  org  Unneeded medications should be disposed of in special ways to ensure that pets, children, and other people cannot consume them  However, you should not flush this medication down the toilet   Instead, the best way to dispose of your medication is through a medicine take-back program  Talk to your pharmacist or contact your local garbage/recycling department to learn about take-back programs in your community  See the FDA's Safe Disposal of Medicines website (http://goo  gl/c4Rm4p) for more information if you do not have access to a take-back program   In case of emergency/overdose    Expand Section  In case of overdose, call the poison control helpline at 1-652.944.2476  Information is also available online at [employer]  If the victim has collapsed, had a seizure, has trouble breathing, or can't be awakened, immediately call emergency services at 911  Symptoms of overdose may include the following:   · seizure  · hallucinating (seeing things or hearing voices that do not exist)  · loss of consciousness  · rapid or pounding heartbeat    What other information should I know? Expand Section  Keep all appointments with your doctor  Before having any laboratory test, tell your doctor and the laboratory personnel that you are taking bupropion  Do not let anyone else take your medication  Ask your pharmacist any questions you have about refilling your prescription  If you are taking the extended-release tablet, you may notice something that looks like a tablet in your stool  This is just the empty tablet shell and does not mean that you did not get your complete dose of medication  It is important for you to keep a written list of all of the prescription and nonprescription (over-the-counter) medicines you are taking, as well as any products such as vitamins, minerals, or other dietary supplements  You should bring this list with you each time you visit a doctor or if you are admitted to a hospital  It is also important information to carry with you in case of emergencies
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
## 2018-12-20 ENCOUNTER — TELEPHONE (OUTPATIENT)
Dept: FAMILY MEDICINE CLINIC | Facility: CLINIC | Age: 28
End: 2018-12-20

## 2018-12-20 DIAGNOSIS — M54.9 BACK PAIN, UNSPECIFIED BACK LOCATION, UNSPECIFIED BACK PAIN LATERALITY, UNSPECIFIED CHRONICITY: Primary | ICD-10-CM

## 2019-01-09 DIAGNOSIS — G47.00 INSOMNIA, UNSPECIFIED TYPE: ICD-10-CM

## 2019-01-10 RX ORDER — TRAZODONE HYDROCHLORIDE 50 MG/1
50 TABLET ORAL
Qty: 45 TABLET | Refills: 0 | Status: SHIPPED | OUTPATIENT
Start: 2019-01-10 | End: 2019-02-13 | Stop reason: SDUPTHER

## 2019-01-30 ENCOUNTER — TELEPHONE (OUTPATIENT)
Dept: BEHAVIORAL/MENTAL HEALTH CLINIC | Facility: CLINIC | Age: 29
End: 2019-01-30

## 2019-01-30 DIAGNOSIS — F41.9 ANXIETY DISORDER, UNSPECIFIED TYPE: ICD-10-CM

## 2019-01-30 DIAGNOSIS — F33.41 MAJOR DEPRESSIVE DISORDER, RECURRENT, IN PARTIAL REMISSION (HCC): ICD-10-CM

## 2019-01-30 RX ORDER — BUPROPION HYDROCHLORIDE 150 MG/1
150 TABLET ORAL DAILY
Qty: 30 TABLET | Refills: 0 | Status: SHIPPED | OUTPATIENT
Start: 2019-01-30 | End: 2019-02-13 | Stop reason: SDUPTHER

## 2019-01-30 NOTE — TELEPHONE ENCOUNTER
Patient called to cancel today because she had surgery, and she is not cleared to drive yet  She rescheduled for 02/13 but asks if you could give her a 2 week supply of wellbutrin until she see's you

## 2019-02-13 ENCOUNTER — OFFICE VISIT (OUTPATIENT)
Dept: PSYCHIATRY | Facility: CLINIC | Age: 29
End: 2019-02-13
Payer: COMMERCIAL

## 2019-02-13 DIAGNOSIS — G47.00 INSOMNIA, UNSPECIFIED TYPE: ICD-10-CM

## 2019-02-13 DIAGNOSIS — F41.9 ANXIETY DISORDER, UNSPECIFIED TYPE: ICD-10-CM

## 2019-02-13 DIAGNOSIS — F33.41 MAJOR DEPRESSIVE DISORDER, RECURRENT, IN PARTIAL REMISSION (HCC): ICD-10-CM

## 2019-02-13 PROCEDURE — 99213 OFFICE O/P EST LOW 20 MIN: CPT | Performed by: PSYCHIATRY & NEUROLOGY

## 2019-02-13 RX ORDER — TRAZODONE HYDROCHLORIDE 50 MG/1
50 TABLET ORAL
Qty: 90 TABLET | Refills: 1 | Status: SHIPPED | OUTPATIENT
Start: 2019-02-13 | End: 2019-06-20 | Stop reason: SDUPTHER

## 2019-02-13 RX ORDER — BUPROPION HYDROCHLORIDE 300 MG/1
300 TABLET ORAL DAILY
Qty: 90 TABLET | Refills: 2 | Status: SHIPPED | OUTPATIENT
Start: 2019-02-13 | End: 2019-05-15

## 2019-02-13 NOTE — PSYCH
Subjective:     Patient ID: Rajesh Serna is a 29 y o  female with anxiety, depression, and insomnia is being seen for a follow up visit  Last time she Wellbutrin was added to Celexa with the goal of stopping Celexa  She had bariatric surgery on January 15th  She is not back on solid foods and has now lost 30 lbs  She is feeling good  Her big issue is making sure she is getting enough food and water now as it is difficult to meet the requirements  Her energy level is good once she is up and going  She is able to sleep through the night  She sleeps about 7 hours a night  She stated that her mood has been good  No trouble with Wellbutrin, denied side effects  Denied hopelessness  Denied SI/HI/AVH  She is still taking the celexa but hoping to stop  HPI ROS Appetite Changes and Sleep: decreased appetite, normal energy level, recent weight loss(30lbs) and normal number of sleep hours    Review Of Systems:     Mood Normal   Behavior Normal    Thought Content Normal   General Normal    Personality Normal   Other Psych Symptoms Normal   Constitutional As Noted in HPI   ENT Negative   Cardiovascular Negative   Respiratory Negative   Gastrointestinal Nausea and Vomiting   Genitourinary Negative   Musculoskeletal Negative   Integumentary Negative   Neurological Negative   Endocrine Normal    Other Symptoms Normal              Laboratory Results: No results found for this or any previous visit      Substance Abuse History:  Social History     Substance and Sexual Activity   Drug Use No       Family Psychiatric History:   Family History   Problem Relation Age of Onset   Delcie Kolby Melanoma Mother     No Known Problems Father     Other Maternal Grandmother         colitis    Diabetes Maternal Grandmother     Hypertension Maternal Grandmother     Stroke Paternal Grandfather     Heart disease Neg Hx     Thyroid disease Neg Hx        The following portions of the patient's history were reviewed and updated as appropriate: allergies, current medications, past family history, past medical history, past social history, past surgical history and problem list     Social History     Socioeconomic History    Marital status: Single     Spouse name: Not on file    Number of children: 0    Years of education: BA in social work    Highest education level: Not on file   Occupational History    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   Social Needs    Financial resource strain: Not on file    Food insecurity:     Worry: Not on file     Inability: Not on file   [employer] needs:     Medical: Not on file     Non-medical: Not on file   Tobacco Use    Smoking status: Never Smoker    Smokeless tobacco: Never Used   Substance and Sexual Activity    Alcohol use: Yes     Comment: occasional    Drug use: No    Sexual activity: Not Currently     Birth control/protection: IUD   Lifestyle    Physical activity:     Days per week: Not on file     Minutes per session: Not on file    Stress: Not on file   Relationships    Social connections:     Talks on phone: Not on file     Gets together: Not on file     Attends [employer]ism service: Not on file     Active member of club or organization: Not on file     Attends meetings of clubs or organizations: Not on file     Relationship status: Not on file    Intimate partner violence:     Fear of current or ex partner: Not on file     Emotionally abused: Not on file     Physically abused: Not on file     Forced sexual activity: Not on file   Other Topics Concern    Not on file   Social History Narrative    Not on file     Social History     Social History Narrative    Not on file       Objective:       Mental status:  Appearance calm and cooperative , adequate hygiene and grooming and good eye contact    Mood euthymic   Affect affect was broad   Speech a normal rate   Thought Processes coherent/organized and normal thought processes   Hallucinations no hallucinations present    Thought Content no delusions   Abnormal Thoughts no suicidal thoughts  and no homicidal thoughts    Orientation  oriented to person and place and time   Remote Memory short term memory intact and long term memory intact   Attention Span concentration intact   Intellect Appears to be of Average Intelligence   Insight Insight intact   Judgement judgment was intact   Muscle Strength Muscle strength and tone were normal and Normal gait    Language no difficulty naming common objects, no difficulty repeating a phrase  and no difficulty writing a sentence    Fund of Knowledge displays adequate knowledge of current events, adequate fund of knowledge regarding past history and adequate fund of knowledge regarding vocabulary    Pain none   Pain Scale 0     Results for Lacie Manning (MRN [id number]) as of 2/13/2019 15:53   Ref   Range 5/15/2018 11:52 5/21/2018 12:35 9/12/2018 10:40 9/12/2018 10:41 11/1/2018 16:59   Sodium Latest Ref Range: 135 - 146 mmol/L 139       Potassium Latest Ref Range: 3 5 - 5 3 mmol/L 4 1       Chloride Latest Ref Range: 98 - 110 mmol/L 107       CO2 Latest Ref Range: 20 - 31 mmol/L 26       BUN Latest Ref Range: 7 - 25 mg/dL 13       Creatinine Latest Ref Range: 0 50 - 1 10 mg/dL 0 93       SL AMB BUN/CREATININE RATIO Latest Ref Range: 6 - 22 (calc) NOT APPLICABLE       Glucose, Random Latest Ref Range: 65 - 139 mg/dL 88       AST Latest Ref Range: 10 - 30 U/L 21       ALT Latest Ref Range: 6 - 29 U/L 27       Alkaline Phosphatase Latest Ref Range: 33 - 115 U/L 97       Total Protein Latest Ref Range: 6 1 - 8 1 g/dL 7 1       Albumin Latest Ref Range: 3 6 - 5 1 g/dL 4 2       TOTAL BILIRUBIN Latest Ref Range: 0 2 - 1 2 mg/dL 0 5       eGFR African American Latest Ref Range: > OR = 60 mL/min/1 73m2 98       eGFR Non African American Latest Ref Range: > OR = 60 mL/min/1 73m2 84       Albumin/Globulin Ratio Latest Ref Range: 1 0 - 2 5 (calc) 1 4       Globulin Latest Ref Range: 1 9 - 3 7 g/dL (calc) 2 9       17-OH PROGESTERONE Latest Ref Range: see note ng/dL 25       DHEA-SO4 Latest Ref Range: 18 - 391 mcg/dL 156       Hemoglobin A1C Unknown     5 2   ANDROSTERONE Latest Ref Range: 20 0 - 80 0 ng/dL 15 6 (L)       Testosterone, Total, LC/MS Latest Ref Range: 2 - 45 ng/dL 25       TESTOSTERONE FREE Latest Ref Range: 0 1 - 6 4 pg/mL 3 7       INSULIN Latest Ref Range: 2 0 - 19 6 uIU/mL  34 8 (H)       TSH, POC Latest Units: mIU/L 1 19       White Blood Cell Count Latest Ref Range: 3 8 - 10 8 Thousand/uL 9 2       Red Blood Cell Count Latest Ref Range: 3 80 - 5 10 Million/uL 4 96       Hemoglobin Latest Ref Range: 11 7 - 15 5 g/dL 13 2       HCT Latest Ref Range: 35 0 - 45 0 % 40 9       MCV Latest Ref Range: 80 0 - 100 0 fL 82 5       MCH Latest Ref Range: 27 0 - 33 0 pg 26 6 (L)       MCHC   Latest Ref Range: 32 0 - 36 0 g/dL 32 3       RDW Latest Ref Range: 11 0 - 15 0 % 14 3       Platelet Count Latest Ref Range: 140 - 400 Thousand/uL 400       Neutrophils Latest Units: % 71 6       Lymphocytes Latest Units: % 20 5       Monocytes Latest Units: % 6 9       Eosinophils Latest Units: % 0 7       Basophils PCT Latest Units: % 0 3       Neutrophils (Absolute) Latest Ref Range: 1,500 - 7,800 cells/uL 6,587       Lymphocytes (Absolute) Latest Ref Range: 850 - 3,900 cells/uL 1,886       Monocytes (Absolute) Latest Ref Range: 200 - 950 cells/uL 635       Eosinophils (Absolute) Latest Ref Range: 15 - 500 cells/uL 64       Basophils ABS Latest Ref Range: 0 - 200 cells/uL 28       CORTISOL 24H URINARY FREE Latest Ref Range: 4 0 - 50 0 mcg/24 h  22 8      24 HR URINE VOLUME Latest Units: mL  1,100      CULT RSLT GENITAL Unknown    See Comment    Always Message Unknown   See Comment     CHLAMYDIA/N  GONORRHOEAE RNA, TMA ([facility]) Unknown   Rpt     Creatinine, 24H Ur Latest Ref Range: 0 63 - 2 50 g/24 h  1 72      Chlamydia Swab, TMA Aptima Latest Ref Range: NOT DETECTED    NOT DETECTED GENITAL COMPREHENSIVE CULTURE Unknown    Rpt    Neisseria Gonorrhoeae RNA, TMA Latest Ref Range: NOT DETECTED    NOT DETECTED     SL AMB CALCIUM Latest Ref Range: 8 6 - 10 2 mg/dL 9 3       SL AMB MPV Latest Ref Range: 7 5 - 12 5 fL 10 6         Assessment/Plan:       Diagnoses and all orders for this visit:    Major depressive disorder, recurrent, in partial remission (HCC)  -   Increase to  buPROPion (WELLBUTRIN XL) 300 mg 24 hr tablet; Take 1 tablet (300 mg total) by mouth daily    Anxiety disorder, unspecified type  -     buPROPion (WELLBUTRIN XL) 300 mg 24 hr tablet; Take 1 tablet (300 mg total) by mouth daily  -  Taper celexa 10mg every other day alternating with 5mg every other day for 1 week then 5mg for 1 week, then stop  Insomnia, unspecified type  -     traZODone (DESYREL) 50 mg tablet; Take 1 tablet (50 mg total) by mouth daily at bedtime as needed for sleep for up to 30 days can take 0 5 tabs to 1 tab            Treatment Recommendations- Risks Benefits      Immediate Medical/Psychiatric/Psychotherapy Treatments and Any Precautions: continue to take medications  Tapering off of celexa  Follow up in 3 months  Risks, Benefits And Possible Side Effects Of Medications:  PSYCH RISK, BENEFITS AND POSSIBLE SIDE EFFECTS discussed    Controlled Medication Discussion: No records found for controlled prescriptions according to [facility]       Psychotherapy Provided: Individual psychotherapy provided       Goals discussed in session: mood and appetite

## 2019-02-13 NOTE — PATIENT INSTRUCTIONS
Major depressive disorder, recurrent, in partial remission (HCC)  -   Increase to  buPROPion (WELLBUTRIN XL) 300 mg 24 hr tablet; Take 1 tablet (300 mg total) by mouth daily    Anxiety disorder, unspecified type  -     buPROPion (WELLBUTRIN XL) 300 mg 24 hr tablet; Take 1 tablet (300 mg total) by mouth daily  -  Taper celexa 10mg every other day alternating with 5mg every other day for 1 week then 5mg for 1 week, then stop  Insomnia, unspecified type  -     traZODone (DESYREL) 50 mg tablet;  Take 1 tablet (50 mg total) by mouth daily at bedtime as needed for sleep for up to 30 days can take 0 5 tabs to 1 tab

## 2019-04-18 ENCOUNTER — TELEPHONE (OUTPATIENT)
Dept: FAMILY MEDICINE CLINIC | Facility: CLINIC | Age: 29
End: 2019-04-18

## 2019-05-15 ENCOUNTER — OFFICE VISIT (OUTPATIENT)
Dept: PSYCHIATRY | Facility: CLINIC | Age: 29
End: 2019-05-15
Payer: COMMERCIAL

## 2019-05-15 DIAGNOSIS — F90.9 ATTENTION DEFICIT HYPERACTIVITY DISORDER (ADHD), UNSPECIFIED ADHD TYPE: ICD-10-CM

## 2019-05-15 DIAGNOSIS — F33.41 MAJOR DEPRESSIVE DISORDER, RECURRENT, IN PARTIAL REMISSION (HCC): Primary | ICD-10-CM

## 2019-05-15 DIAGNOSIS — G47.00 INSOMNIA, UNSPECIFIED TYPE: ICD-10-CM

## 2019-05-15 DIAGNOSIS — F41.9 ANXIETY DISORDER, UNSPECIFIED TYPE: ICD-10-CM

## 2019-05-15 PROCEDURE — 99214 OFFICE O/P EST MOD 30 MIN: CPT | Performed by: PSYCHIATRY & NEUROLOGY

## 2019-05-15 RX ORDER — ATOMOXETINE 40 MG/1
40 CAPSULE ORAL DAILY
Qty: 60 CAPSULE | Refills: 0 | Status: SHIPPED | OUTPATIENT
Start: 2019-05-15 | End: 2019-06-10 | Stop reason: SDUPTHER

## 2019-05-15 RX ORDER — BUPROPION HYDROCHLORIDE 75 MG/1
75 TABLET ORAL 2 TIMES DAILY
Qty: 60 TABLET | Refills: 0 | Status: SHIPPED | OUTPATIENT
Start: 2019-05-15 | End: 2019-06-10 | Stop reason: SDUPTHER

## 2019-05-19 DIAGNOSIS — R06.81 GERD WITH APNEA: ICD-10-CM

## 2019-05-19 DIAGNOSIS — K21.9 GERD WITH APNEA: ICD-10-CM

## 2019-05-20 RX ORDER — OMEPRAZOLE 40 MG/1
CAPSULE, DELAYED RELEASE ORAL
Qty: 90 CAPSULE | Refills: 3 | Status: SHIPPED | OUTPATIENT
Start: 2019-05-20 | End: 2019-08-12 | Stop reason: SDUPTHER

## 2019-06-10 DIAGNOSIS — F90.9 ATTENTION DEFICIT HYPERACTIVITY DISORDER (ADHD), UNSPECIFIED ADHD TYPE: ICD-10-CM

## 2019-06-10 DIAGNOSIS — F41.9 ANXIETY DISORDER, UNSPECIFIED TYPE: ICD-10-CM

## 2019-06-10 DIAGNOSIS — F33.41 MAJOR DEPRESSIVE DISORDER, RECURRENT, IN PARTIAL REMISSION (HCC): ICD-10-CM

## 2019-06-11 RX ORDER — BUPROPION HYDROCHLORIDE 75 MG/1
75 TABLET ORAL 2 TIMES DAILY
Qty: 60 TABLET | Refills: 0 | Status: SHIPPED | OUTPATIENT
Start: 2019-06-11 | End: 2019-07-26

## 2019-06-11 RX ORDER — ATOMOXETINE 40 MG/1
CAPSULE ORAL
Qty: 60 CAPSULE | Refills: 0 | Status: SHIPPED | OUTPATIENT
Start: 2019-06-11 | End: 2019-07-26

## 2019-06-20 ENCOUNTER — TELEPHONE (OUTPATIENT)
Dept: PSYCHIATRY | Facility: CLINIC | Age: 29
End: 2019-06-20

## 2019-06-20 DIAGNOSIS — G47.00 INSOMNIA, UNSPECIFIED TYPE: ICD-10-CM

## 2019-06-20 RX ORDER — TRAZODONE HYDROCHLORIDE 50 MG/1
50 TABLET ORAL
Qty: 90 TABLET | Refills: 1 | Status: SHIPPED | OUTPATIENT
Start: 2019-06-20 | End: 2019-07-26 | Stop reason: SDUPTHER

## 2019-06-22 DIAGNOSIS — G47.00 INSOMNIA, UNSPECIFIED TYPE: ICD-10-CM

## 2019-06-24 RX ORDER — TRAZODONE HYDROCHLORIDE 50 MG/1
TABLET ORAL
Qty: 45 TABLET | Refills: 1 | OUTPATIENT
Start: 2019-06-24

## 2019-07-23 ENCOUNTER — DOCUMENTATION (OUTPATIENT)
Dept: PSYCHIATRY | Facility: CLINIC | Age: 29
End: 2019-07-23

## 2019-07-23 NOTE — PROGRESS NOTES
Treatment Plan not completed within required time limits due to: Discussion of  her bariatric surgery to most of the time

## 2019-07-25 ENCOUNTER — DOCUMENTATION (OUTPATIENT)
Dept: PSYCHIATRY | Facility: CLINIC | Age: 29
End: 2019-07-25

## 2019-07-25 NOTE — PROGRESS NOTES
Treatment Plan not completed within required time limits due to: Marino Ordaz    cancelled appointment  on 7/26/2019

## 2019-07-26 ENCOUNTER — TELEPHONE (OUTPATIENT)
Dept: BEHAVIORAL/MENTAL HEALTH CLINIC | Facility: CLINIC | Age: 29
End: 2019-07-26

## 2019-07-26 DIAGNOSIS — G47.00 INSOMNIA, UNSPECIFIED TYPE: ICD-10-CM

## 2019-07-26 RX ORDER — TRAZODONE HYDROCHLORIDE 50 MG/1
50 TABLET ORAL
Qty: 90 TABLET | Refills: 2 | Status: SHIPPED | OUTPATIENT
Start: 2019-07-26 | End: 2019-09-27 | Stop reason: SDUPTHER

## 2019-07-26 NOTE — TELEPHONE ENCOUNTER
You had her on wellbutrin and Ophelia  stopped taking them because due to her gasric sleave she get's sick from the medication because of not eating enough food in the am dur to the surgery  Morning pills get her sick  Need's   Omeprazole and trazodone

## 2019-08-06 ENCOUNTER — TELEPHONE (OUTPATIENT)
Dept: PSYCHIATRY | Facility: CLINIC | Age: 29
End: 2019-08-06

## 2019-08-08 NOTE — TELEPHONE ENCOUNTER
miesha called today to request the omeprazole  I told her you will not refill the medication  She said during her last appointment you said you would  She's aware you're not in the office today

## 2019-08-12 DIAGNOSIS — K21.9 GERD WITH APNEA: ICD-10-CM

## 2019-08-12 DIAGNOSIS — R06.81 GERD WITH APNEA: ICD-10-CM

## 2019-08-12 RX ORDER — OMEPRAZOLE 40 MG/1
40 CAPSULE, DELAYED RELEASE ORAL DAILY
Qty: 90 CAPSULE | Refills: 1 | Status: SHIPPED | OUTPATIENT
Start: 2019-08-12 | End: 2019-08-12 | Stop reason: SDUPTHER

## 2019-08-12 RX ORDER — OMEPRAZOLE 40 MG/1
40 CAPSULE, DELAYED RELEASE ORAL DAILY
Qty: 90 CAPSULE | Refills: 1 | Status: SHIPPED | OUTPATIENT
Start: 2019-08-12 | End: 2020-02-19 | Stop reason: SDUPTHER

## 2019-08-14 ENCOUNTER — OFFICE VISIT (OUTPATIENT)
Dept: FAMILY MEDICINE CLINIC | Facility: CLINIC | Age: 29
End: 2019-08-14
Payer: COMMERCIAL

## 2019-08-14 VITALS
OXYGEN SATURATION: 99 % | HEIGHT: 62 IN | HEART RATE: 82 BPM | DIASTOLIC BLOOD PRESSURE: 60 MMHG | BODY MASS INDEX: 29.85 KG/M2 | TEMPERATURE: 99.8 F | SYSTOLIC BLOOD PRESSURE: 110 MMHG | WEIGHT: 162.2 LBS

## 2019-08-14 DIAGNOSIS — J01.00 ACUTE NON-RECURRENT MAXILLARY SINUSITIS: Primary | ICD-10-CM

## 2019-08-14 DIAGNOSIS — Z13.29 SCREENING FOR THYROID DISORDER: ICD-10-CM

## 2019-08-14 DIAGNOSIS — Z13.220 NEED FOR LIPID SCREENING: ICD-10-CM

## 2019-08-14 PROCEDURE — 3008F BODY MASS INDEX DOCD: CPT | Performed by: INTERNAL MEDICINE

## 2019-08-14 PROCEDURE — 99213 OFFICE O/P EST LOW 20 MIN: CPT | Performed by: INTERNAL MEDICINE

## 2019-08-14 RX ORDER — AMOXICILLIN 875 MG/1
875 TABLET, COATED ORAL 2 TIMES DAILY
Qty: 14 TABLET | Refills: 0 | Status: SHIPPED | OUTPATIENT
Start: 2019-08-14 | End: 2019-08-21

## 2019-08-14 NOTE — PROGRESS NOTES
Assessment/Plan:         Diagnoses and all orders for this visit:    Acute non-recurrent maxillary sinusitis  -     amoxicillin (AMOXIL) 875 mg tablet; Take 1 tablet (875 mg total) by mouth 2 (two) times a day for 7 days  Increase fluid intake, rest, saline gargles, saline nasal irrigation, NSAID/tylenol as tolerated  Follow up if symptoms getting worse or seek immediate medical help for emergency  Pt understands the plan  Screening for thyroid disorder  -     CBC and differential  -     Comprehensive metabolic panel  -     Lipid panel  -     TSH, 3rd generation with Free T4 reflex    Need for lipid screening  -     CBC and differential  -     Comprehensive metabolic panel  -     Lipid panel        Subjective:      Patient ID: Claudeen Pale is a 29 y o  female  Sinusitis   This is a new problem  The current episode started in the past 7 days  The problem has been rapidly worsening since onset  Maximum temperature: Low-grade temperature  The pain is moderate  Associated symptoms include congestion, headaches, sinus pressure, a sore throat and swollen glands  Pertinent negatives include no chills, coughing or shortness of breath  The following portions of the patient's history were reviewed and updated as appropriate: allergies, current medications, past medical history, past social history, past surgical history and problem list     Review of Systems   Constitutional: Negative for chills and fever  HENT: Positive for congestion, sinus pressure and sore throat  Respiratory: Negative for cough and shortness of breath  Neurological: Positive for headaches           Objective:      /60 (BP Location: Left arm, Patient Position: Sitting, Cuff Size: Standard)   Pulse 82   Temp 99 8 °F (37 7 °C) (Tympanic)   Ht 5' 2" (1 575 m)   Wt 73 6 kg (162 lb 3 2 oz)   SpO2 99%   BMI 29 67 kg/m²          Physical Exam   Constitutional:   Sounds congested   HENT:   Positive facial pressure  Erythematous throat   Cardiovascular: Normal rate, regular rhythm and normal heart sounds  No murmur heard  Pulmonary/Chest: Effort normal and breath sounds normal  No stridor  No respiratory distress  She has no wheezes  Lymphadenopathy:     She has cervical adenopathy  Neurological: She is alert

## 2019-08-20 DIAGNOSIS — J01.00 ACUTE NON-RECURRENT MAXILLARY SINUSITIS: Primary | ICD-10-CM

## 2019-08-20 RX ORDER — MOXIFLOXACIN HYDROCHLORIDE 400 MG/1
400 TABLET ORAL DAILY
Qty: 5 TABLET | Refills: 0 | Status: SHIPPED | OUTPATIENT
Start: 2019-08-20 | End: 2019-08-25

## 2019-08-26 ENCOUNTER — OFFICE VISIT (OUTPATIENT)
Dept: OBGYN CLINIC | Facility: MEDICAL CENTER | Age: 29
End: 2019-08-26
Payer: COMMERCIAL

## 2019-08-26 ENCOUNTER — TELEPHONE (OUTPATIENT)
Dept: FAMILY MEDICINE CLINIC | Facility: CLINIC | Age: 29
End: 2019-08-26

## 2019-08-26 VITALS — SYSTOLIC BLOOD PRESSURE: 100 MMHG | DIASTOLIC BLOOD PRESSURE: 72 MMHG | WEIGHT: 157 LBS | BODY MASS INDEX: 28.72 KG/M2

## 2019-08-26 DIAGNOSIS — R35.0 FREQUENT URINATION: ICD-10-CM

## 2019-08-26 DIAGNOSIS — N89.8 VAGINAL DISCHARGE: Primary | ICD-10-CM

## 2019-08-26 LAB
SL AMB  POCT GLUCOSE, UA: NORMAL
SL AMB LEUKOCYTE ESTERASE,UA: NORMAL
SL AMB POCT BILIRUBIN,UA: NORMAL
SL AMB POCT BLOOD,UA: NORMAL
SL AMB POCT CLARITY,UA: NORMAL
SL AMB POCT COLOR,UA: YELLOW
SL AMB POCT KETONES,UA: NORMAL
SL AMB POCT NITRITE,UA: NORMAL
SL AMB POCT PH,UA: 6
SL AMB POCT SPECIFIC GRAVITY,UA: 1
SL AMB POCT URINE PROTEIN: NORMAL
SL AMB POCT UROBILINOGEN: 0.2

## 2019-08-26 PROCEDURE — 81002 URINALYSIS NONAUTO W/O SCOPE: CPT | Performed by: NURSE PRACTITIONER

## 2019-08-26 PROCEDURE — 99214 OFFICE O/P EST MOD 30 MIN: CPT | Performed by: NURSE PRACTITIONER

## 2019-08-26 RX ORDER — FLUCONAZOLE 200 MG/1
TABLET ORAL
Qty: 2 TABLET | Refills: 0 | Status: SHIPPED | OUTPATIENT
Start: 2019-08-26 | End: 2019-08-30

## 2019-08-26 NOTE — TELEPHONE ENCOUNTER
----- Message from Melissa Hdez sent at 8/26/2019  4:36 PM EDT -----  Regarding: Non-Urgent Medical Question  Contact: [phone]  Letting you know that I got in to see my OBGYN today  It's not a UTI, likely a yeast infection from the antibiotic I took for my sinus infection  They gave me a script and I'll get my test results in a few days  The Dr said I should ask for yeast infection medicine next time I'm prescribed an antibiotic   Thanks!!!

## 2019-08-26 NOTE — PROGRESS NOTES
A/P:  29 y o  yo female with:  Diagnoses and all orders for this visit:    Vaginal discharge  -     fluconazole (DIFLUCAN) 200 mg tablet; Take one tab now and repeat in 4 days  -     VAGINOSIS DNA PROBE (AFFIRM)    Frequent urination  -     POCT urine dip        1  Wet prep was not obtained  Cultures for gonorrhea and chlamydia were not collected  Affirm was obtained  2   Will contact pt with results  3  Patient was treated with fluconazole, doesn't feel like she can wait for culture results  4  Urine cc in office was negative  5  Vaginal hygiene d/w her  HISTORY OF PRESENT ILLNESS:  Gianna Jason is a 29 y o  yo [facility] female who presents for vaginal discharge  She describes the discharge as thick and white  Her symptoms started 1 weeks ago  show no change she has a lot of vaginal itching  Has not been sexually active for awhile  Alleviating factors: none  Aggravating factors: none    ROS:   She denies hematuria, dysuria, constipation, diarrhea, fever, chills, nausea or emesis      Past Medical History:   Diagnosis Date    Abdominal pain     Abnormal CAT scan     Anxiety     Anxiety     Blood in stool     Candidiasis of skin     Chronic diarrhea     Chronic fatigue     Colitis     Community acquired bacterial pneumonia     Depression     Dyspepsia     GERD (gastroesophageal reflux disease)     Hemorrhoids     Herpes labialis     IBS (irritable bowel syndrome)     Left ovarian cyst     Morbid obesity (HCC)     Nausea & vomiting     Sleep pattern disturbance     Strain of thoracic spine, initial encounter        Past Medical History:   Diagnosis Date    Abdominal pain     Abnormal CAT scan     Anxiety     Anxiety     Blood in stool     Candidiasis of skin     Chronic diarrhea     Chronic fatigue     Colitis     Community acquired bacterial pneumonia     Depression     Dyspepsia     GERD (gastroesophageal reflux disease)     Hemorrhoids     Herpes labialis  IBS (irritable bowel syndrome)     Left ovarian cyst     Morbid obesity (HCC)     Nausea & vomiting     Sleep pattern disturbance     Strain of thoracic spine, initial encounter        Social History     Socioeconomic History    Marital status: Single     Spouse name: Not on file    Number of children: 0    Years of education: BA in social work    Highest education level: Not on file   Occupational History    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   Social Needs    Financial resource strain: Not on file    Food insecurity:     Worry: Not on file     Inability: Not on file   Holidog needs:     Medical: Not on file     Non-medical: Not on file   Tobacco Use    Smoking status: Never Smoker    Smokeless tobacco: Never Used   Substance and Sexual Activity    Alcohol use: Yes     Comment: occasional    Drug use: No    Sexual activity: Not Currently     Birth control/protection: IUD   Lifestyle    Physical activity:     Days per week: Not on file     Minutes per session: Not on file    Stress: Not on file   Relationships    Social connections:     Talks on phone: Not on file     Gets together: Not on file     Attends [employer] service: Not on file     Active member of club or organization: Not on file     Attends meetings of clubs or organizations: Not on file     Relationship status: Not on file    Intimate partner violence:     Fear of current or ex partner: Not on file     Emotionally abused: Not on file     Physically abused: Not on file     Forced sexual activity: Not on file   Other Topics Concern    Not on file   Social History Narrative    Not on file       /72   Wt 71 2 kg (157 lb)   BMI 28 72 kg/m²     GEN: The patient was alert and oriented x3, pleasant well-appearing female in no acute distress  Pelvic: Normal appearing external female genitalia, normal vaginal epithelium, normalappearing cervix iud string seen    positive discharge noted

## 2019-08-28 DIAGNOSIS — B96.89 BV (BACTERIAL VAGINOSIS): Primary | ICD-10-CM

## 2019-08-28 DIAGNOSIS — N76.0 BV (BACTERIAL VAGINOSIS): Primary | ICD-10-CM

## 2019-08-28 RX ORDER — METRONIDAZOLE 500 MG/1
500 TABLET ORAL 2 TIMES DAILY
Qty: 14 TABLET | Refills: 0 | Status: SHIPPED | OUTPATIENT
Start: 2019-08-28 | End: 2019-09-04

## 2019-08-28 NOTE — TELEPHONE ENCOUNTER
----- Message from Vanessa Cavazosr, 10 Yue  sent at 8/28/2019  8:16 AM EDT -----   Notify that   Culture shows BV and  Yeast infection    I already treated her yeast infection please pend   Rx for metronidazole 500 mg b i d  x7 days to  Me   thanks  ----- Message -----  From: Diania Gottron, MA  Sent: 8/26/2019   3:36 PM EDT  To: [address] [place] [address]

## 2019-09-04 ENCOUNTER — TELEPHONE (OUTPATIENT)
Dept: PSYCHIATRY | Facility: CLINIC | Age: 29
End: 2019-09-04

## 2019-09-04 NOTE — TELEPHONE ENCOUNTER
miesha's anxiety has increased a lot, she would like to see you sooner than October  Unfortunately your schedule is completely booked   I will see if she's comfortable meeting an NP

## 2019-09-18 ENCOUNTER — HOSPITAL ENCOUNTER (EMERGENCY)
Facility: HOSPITAL | Age: 29
Discharge: HOME | End: 2019-09-18
Attending: EMERGENCY MEDICINE | Admitting: EMERGENCY MEDICINE
Payer: COMMERCIAL

## 2019-09-18 VITALS
SYSTOLIC BLOOD PRESSURE: 110 MMHG | TEMPERATURE: 97.8 F | OXYGEN SATURATION: 99 % | DIASTOLIC BLOOD PRESSURE: 70 MMHG | HEART RATE: 82 BPM | RESPIRATION RATE: 16 BRPM | BODY MASS INDEX: 27.7 KG/M2 | WEIGHT: 151.46 LBS

## 2019-09-18 DIAGNOSIS — M54.9 BACK PAIN: Primary | ICD-10-CM
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
## 2019-09-18 DIAGNOSIS — M62.838 MUSCLE SPASM: ICD-10-CM

## 2019-09-18 PROCEDURE — 99283 EMERGENCY DEPT VISIT LOW MDM: CPT | Performed by: PHYSICIAN ASSISTANT

## 2019-09-18 PROCEDURE — 99283 EMERGENCY DEPT VISIT LOW MDM: CPT

## 2019-09-18 RX ORDER — METHOCARBAMOL 500 MG/1
500 TABLET, FILM COATED ORAL 3 TIMES DAILY
Qty: 12 TABLET | Refills: 0 | Status: SHIPPED | OUTPATIENT
Start: 2019-09-18 | End: 2020-02-21

## 2019-09-18 RX ORDER — LIDOCAINE 50 MG/G
1 PATCH TOPICAL ONCE
Status: DISCONTINUED | OUTPATIENT
Start: 2019-09-18 | End: 2019-09-18 | Stop reason: HOSPADM

## 2019-09-18 RX ORDER — MULTIVITAMIN
1 TABLET ORAL DAILY
COMMUNITY

## 2019-09-18 RX ADMIN — LIDOCAINE 1 PATCH: 50 PATCH TOPICAL at 15:01
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
## 2019-09-26 NOTE — PSYCH
MEDICATION MANAGEMENT NOTE        [address] [place] [address]      Name and Date of Birth:  Camila Pizano 29 y o  1990    Date of Visit: September 27, 2019    HPI:    Camila Pizano is a Pt of Ana Paula Alva MD whose schedule is quite booked and Pt was last seen by the psychiatrist 5/15/2019  Pt is here sooner than her scheduled appt with the doctor with primary c/o / Area of need: "I am an empath, controlling my feelings has been hard since my surgery on 1/15/2019 "   Depression has been 8-10/10  She has been "Crying uncontrollingly" with sadness, impaired energy and motivation, insomnia, poor appetite, and some anhedonia  She is also having high anxiety rated 8-10/10 with worry over more things, sometimes "Irrational" worries, also panic attacks occurring once weekly with Sxs of severe anxiety, chest pain, SOB, dizziness, tachycardia, tremors, feeling of need to urinate  She seeks contact with friends/family to divert thoughts during panic and this helps to "Mask it "  She will lye in bed in the dark and use aromatherapy and watch a funny or happy TV show to help resolve the panic  Her emotional reactions are affecting her home and work functioning but she is able to do her basic home care and ADL  Her work performance is suffering and she has missed one day due to mood  Her focus is not as good but she is not more hyper  Her bariatric doctor retired and gave her a list of new GI physicians but Pt has not obtained a new specialist due to cost and her PMD has been managing her f/u and Sxs  She has a long term goal to "Better manage my emotions "   Pt presently denies any SI, HI, self-injurious behaviors/ideations, disordered eating --ie purposeful restricting/binging/purging/compensatory behaviors, or manic or psychotic Sxs  Pt reports compliance to her psychiatric medications without SE    Pt increased psychotherapy sessions with Angus Pate from monthly biweekly        Pt has the IUD in place since 12/2016 and it is due to be removed 12/8/2021    Appetite Changes and Sleep: decreased sleep, decreased appetite, decreased energy    Review Of Systems:      Constitutional feeling tired   ENT negative   Cardiovascular as noted in HPI   Respiratory as noted in HPI   Gastrointestinal as noted in HPI   Genitourinary as noted in HPI   Musculoskeletal negative   Integumentary negative   Neurological as noted in HPI   Endocrine negative   Other Symptoms none       Past Psychiatric History:   As copied from Dr Sharda Gonzalez 12/19/2018 eval note with updates as needed:  "[ Past Inpatient Psychiatric Treatment:   In Patient: none  Past Outpatient Psychiatric Treatment:    individual therapy now, used to see Dr Martina Hargrove  Past Suicide Attempts:               no  Past Violent Behavior:               no                                                                                    ] "    Past psychotherapist: Sima Cross    Prior Rx trials: Nortriptyline (blurry vision), Citalopram, Strattera (bad abdominal pain), Bupropion XL (s/p bariatric surgery),Trazodone    Past Medical History:    Past Medical History:   Diagnosis Date    Abdominal pain     Abnormal CAT scan     Anxiety     Anxiety     Blood in stool     Candidiasis of skin     Chronic diarrhea     Chronic fatigue     Colitis     Community acquired bacterial pneumonia     Depression     Dyspepsia     GERD (gastroesophageal reflux disease)     Hemorrhoids     Herpes labialis     IBS (irritable bowel syndrome)     Left ovarian cyst     Morbid obesity ([address] )     Nausea & vomiting     Sleep pattern disturbance     Strain of thoracic spine, initial encounter        Substance Abuse History:    Social History     Substance and Sexual Activity   Alcohol Use Yes    Comment: occasional     Social History     Substance and Sexual Activity   Drug Use No       Social History:    Social History     Socioeconomic History    Marital status: Single     Spouse name: Not on file    Number of children: 0    Years of education: BA in social work   [employer] education level: Not on file   Occupational History    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   Social Needs    Financial resource strain: Not on file    Food insecurity:     Worry: Not on file     Inability: Not on file   [employer] needs:     Medical: Not on file     Non-medical: Not on file   Tobacco Use    Smoking status: Never Smoker    Smokeless tobacco: Never Used   Substance and Sexual Activity    Alcohol use: Yes     Comment: occasional    Drug use: No    Sexual activity: Not Currently     Birth control/protection: IUD   Lifestyle    Physical activity:     Days per week: Not on file     Minutes per session: Not on file    Stress: Not on file   Relationships    Social connections:     Talks on phone: Not on file     Gets together: Not on file     Attends Church service: Not on file     Active member of club or organization: Not on file     Attends meetings of clubs or organizations: Not on file     Relationship status: Not on file    Intimate partner violence:     Fear of current or ex partner: Not on file     Emotionally abused: Not on file     Physically abused: Not on file     Forced sexual activity: Not on file   Other Topics Concern    Not on file   Social History Narrative    Not on file       Family Psychiatric History:     Family History   Problem Relation Age of Onset    Melanoma Mother     No Known Problems Father     Other Maternal Grandmother         colitis    Diabetes Maternal Grandmother     Hypertension Maternal Grandmother     Stroke Paternal Grandfather     Heart disease Neg Hx     Thyroid disease Neg Hx        History Review:  The following portions of the patient's history were reviewed and updated as appropriate: allergies, current medications, past family history, past medical history, past social history, past surgical history and problem list          OBJECTIVE:     Mental Status Evaluation:    Appearance Casually dressed, fair eye contact, good hygiene   Behavior cooperative, pleasant, fidgeting with hands, anxious bearing   Speech Clear, normal volume, somewhat rapid   Mood Depressed, anxious   Affect Constricted, tearful   Thought Processes Organized, goal directed, preoccupied with her anxiety, worrying more   Associations intact associations   Thought Content No delusions   Perceptual Disturbances: Pt denies any form of hallucinations and does not appear to be responding to internal stimuli   Abnormal Thoughts  Risk Potential Suicidal ideation - None  Homicidal ideation - None  Potential for aggression - No   Orientation oriented to person, place, situation, day of week, date, month of year and year   Memory short term memory grossly intact   Cosciousness alert and awake   Attention Span attention span and concentration are age appropriate   Intellect appears to be of average intelligence   Insight good   Judgement good   Muscle Strength and  Gait normal gait and normal balance   Language no difficulty naming common objects, no difficulty repeating a phrase, no difficulty writing a sentence   Fund of Knowledge adequate knowledge of current events  adequate fund of knowledge regarding past history  adequate fund of knowledge regarding vocabulary    Pain none   Pain Scale 0       Laboratory Results: I have personally reviewed all pertinent laboratory/tests results  Assessment/plan:       Diagnoses and all orders for this visit:    Severe episode of recurrent major depressive disorder, without psychotic features ([facility] )  -     buPROPion (WELLBUTRIN) 100 mg tablet;  Take 1 tablet (100 mg total) by mouth 2 (two) times a day In the AM and 12 noon    Other specified anxiety disorders  -     promethazine (PHENERGAN) 50 MG tablet; 1/2 - 1 tab po qd-bid prn anxiety    Attention deficit hyperactivity disorder (ADHD), unspecified ADHD type  -     buPROPion (WELLBUTRIN) 100 mg tablet; Take 1 tablet (100 mg total) by mouth 2 (two) times a day In the AM and 12 noon    Insomnia, unspecified type  -     traZODone (DESYREL) 100 mg tablet; Take 1/2  - 1 tab po qhs prn insomnia    Other orders  -     Cancel: traZODone (DESYREL) 50 mg tablet; Take 1 tablet (50 mg total) by mouth daily at bedtime as needed for sleep can take 0 5 tabs to 1 tab          PLAN:  Pt is having severe depression and anxiety s/p bariatric surgery Wt loss, but no psychotic Sxs, SI, or HI  Discussed Tx options and I will add Promethazine prn for anxiety/panic  I will increase Bupropion for mood and this will also help ADHD  Increase Trazodone for insomnia  Sleep hygiene discussed  Treatment plan done and Pt accepts the plan  Start Promethazine 50mg (1/2-1) tab po qd-bid prn anxiety # 60   Increase:  Bupropion 100mg (1) tab po bid # 60   Trazodone 100mg (1/2 - 1) tab po qhs prn insomnia # 30   Continue psychotherapy with private therapist Sima Corss  Get TSH, CMP, Lipids, CBC with diff--as ordered by PMD  F/U PMD for routine care and s/p bariatric surgery complaints  Return 10/11/2019 as scheduled with Dr Jasmine Ann     Risks/Benefits      Risks, Benefits And Possible Side Effects Of Medications:    Risks, benefits, and possible side effects of medications explained to [facility] and she verbalizes understanding and agreement for treatment  Risks of medications in pregnancy explained to [facility]  She verbalizes understanding and agrees to notify her doctor if she becomes pregnant

## 2019-09-27 ENCOUNTER — OFFICE VISIT (OUTPATIENT)
Dept: PSYCHIATRY | Facility: CLINIC | Age: 29
End: 2019-09-27
Payer: COMMERCIAL

## 2019-09-27 VITALS — WEIGHT: 148.1 LBS | BODY MASS INDEX: 27.25 KG/M2 | HEIGHT: 62 IN

## 2019-09-27 DIAGNOSIS — F33.2 SEVERE EPISODE OF RECURRENT MAJOR DEPRESSIVE DISORDER, WITHOUT PSYCHOTIC FEATURES (HCC): Primary | ICD-10-CM

## 2019-09-27 DIAGNOSIS — F90.9 ATTENTION DEFICIT HYPERACTIVITY DISORDER (ADHD), UNSPECIFIED ADHD TYPE: ICD-10-CM

## 2019-09-27 DIAGNOSIS — G47.00 INSOMNIA, UNSPECIFIED TYPE: ICD-10-CM

## 2019-09-27 DIAGNOSIS — F41.8 OTHER SPECIFIED ANXIETY DISORDERS: ICD-10-CM

## 2019-09-27 PROBLEM — F41.9 ANXIETY: Status: RESOLVED | Noted: 2018-03-27 | Resolved: 2019-09-27

## 2019-09-27 PROBLEM — F41.1 ANXIETY, GENERALIZED: Status: RESOLVED | Noted: 2017-11-01 | Resolved: 2019-09-27

## 2019-09-27 PROCEDURE — 99214 OFFICE O/P EST MOD 30 MIN: CPT | Performed by: PHYSICIAN ASSISTANT

## 2019-09-27 RX ORDER — TRAZODONE HYDROCHLORIDE 50 MG/1
50 TABLET ORAL
Qty: 90 TABLET | Refills: 2 | Status: CANCELLED | OUTPATIENT
Start: 2019-09-27 | End: 2019-12-27

## 2019-09-27 RX ORDER — BUPROPION HYDROCHLORIDE 100 MG/1
100 TABLET ORAL 2 TIMES DAILY
Qty: 60 TABLET | Refills: 0 | Status: SHIPPED | OUTPATIENT
Start: 2019-09-27 | End: 2019-10-11

## 2019-09-27 RX ORDER — TRAZODONE HYDROCHLORIDE 100 MG/1
TABLET ORAL
Qty: 30 TABLET | Refills: 0 | Status: SHIPPED | OUTPATIENT
Start: 2019-09-27 | End: 2019-10-11 | Stop reason: SDUPTHER

## 2019-09-27 RX ORDER — PROMETHAZINE HCL 50 MG
TABLET ORAL
Qty: 60 TABLET | Refills: 0 | Status: SHIPPED | OUTPATIENT
Start: 2019-09-27 | End: 2020-01-17 | Stop reason: ALTCHOICE

## 2019-09-27 NOTE — BH TREATMENT PLAN
TREATMENT PLAN (Medication Management Only)        [facility]    Name and Date of Birth:  Rancho Morales 29 y o  1990  Date of Treatment Plan: September 27, 2019  Diagnosis/Diagnoses:    1  Severe episode of recurrent major depressive disorder, without psychotic features ([address] )    2  Other specified anxiety disorders    3  Attention deficit hyperactivity disorder (ADHD), unspecified ADHD type    4  Insomnia, unspecified type      Strengths/Personal Resources for Self-Care: "Very self aware on why I feel the way I do and that thoughts and feelings are irrational"  Area/Areas of need (in own words): "I am an empath, controlling my feelings has been hard since my surgery on 1/15/2019"  1  Long Term Goal: "Better manage my emotions "  Target Date: 2 months - 11/27/2019  Person/Persons responsible for completion of goal: Kailyn Turk (Private therapist)  2  Short Term Objective (s) - How will we reach this goal?:   A  Provider new recommended medication/dosage changes and/or continue medication(s): Pt is having severe depression and anxiety s/p bariatric surgery Wt loss, but no psychotic Sxs, SI, or HI  Discussed Tx options and I will add Promethazine prn for anxiety/panic  I will increase Bupropion for mood and this will also help ADHD  Increase Trazodone for insomnia  Sleep hygiene discussed  Treatment plan done and Pt accepts the plan     Start Promethazine 50mg (1/2-1) tab po qd-bid prn anxiety # 60   Increase:  Bupropion 100mg (1) tab po bid # 60   Trazodone 100mg (1/2 - 1) tab po qhs prn insomnia # 30   Continue psychotherapy with private therapist Mathew Metzger  Get TSH, CMP, Lipids, CBC with diff--as ordered by PMD  F/U PMD for routine care and s/p bariatric surgery complaints  Return 10/11/2019 as scheduled with Dr Stacia Lozano     Target Date: 3-6 months  Person/Persons Responsible for Completion of Goal: Meredith Ness Sanjay Ken  Progress Towards Goals: stable, continuing treatment  Treatment Modality: Medication mgt and psychotherapy  Review due 90 to 120 days from date of this plan: 4 months - 1/27/2020  Expected length of service: ongoing treatment  My Physician/PA/NP and I have developed this plan together and I agree to work on the goals and objectives  I understand the treatment goals that were developed for my treatment

## 2019-09-30 DIAGNOSIS — F41.8 OTHER SPECIFIED ANXIETY DISORDERS: Primary | ICD-10-CM

## 2019-09-30 RX ORDER — ALPRAZOLAM 0.25 MG/1
0.25 TABLET ORAL
Qty: 10 TABLET | Refills: 0 | Status: SHIPPED | OUTPATIENT
Start: 2019-09-30 | End: 2020-04-28 | Stop reason: SDUPTHER

## 2019-10-11 ENCOUNTER — OFFICE VISIT (OUTPATIENT)
Dept: PSYCHIATRY | Facility: CLINIC | Age: 29
End: 2019-10-11
Payer: COMMERCIAL

## 2019-10-11 DIAGNOSIS — G47.00 INSOMNIA, UNSPECIFIED TYPE: ICD-10-CM

## 2019-10-11 DIAGNOSIS — F33.2 SEVERE EPISODE OF RECURRENT MAJOR DEPRESSIVE DISORDER, WITHOUT PSYCHOTIC FEATURES (HCC): ICD-10-CM

## 2019-10-11 DIAGNOSIS — F90.9 ATTENTION DEFICIT HYPERACTIVITY DISORDER (ADHD), UNSPECIFIED ADHD TYPE: ICD-10-CM

## 2019-10-11 PROCEDURE — 99214 OFFICE O/P EST MOD 30 MIN: CPT | Performed by: PSYCHIATRY & NEUROLOGY

## 2019-10-11 RX ORDER — BUPROPION HYDROCHLORIDE 100 MG/1
TABLET ORAL
Qty: 90 TABLET | Refills: 1 | Status: SHIPPED | OUTPATIENT
Start: 2019-10-11 | End: 2019-12-18 | Stop reason: SDUPTHER

## 2019-10-11 RX ORDER — TRAZODONE HYDROCHLORIDE 150 MG/1
150 TABLET ORAL
Qty: 90 TABLET | Refills: 1 | Status: SHIPPED | OUTPATIENT
Start: 2019-10-11 | End: 2019-12-18 | Stop reason: SDUPTHER
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
## 2019-10-11 NOTE — PATIENT INSTRUCTIONS
Severe episode of recurrent major depressive disorder, without psychotic features ([facility] [address] )  -     buPROPion (WELLBUTRIN) 100 mg tablet; 200mg in the morning and 100mg at noon    Attention deficit hyperactivity disorder (ADHD), unspecified ADHD type  -     buPROPion (WELLBUTRIN) 100 mg tablet; 200mg in the morning and 100mg at noon    Insomnia, unspecified type  -     traZODone (DESYREL) 150 mg tablet;  Take 1 tablet (150 mg total) by mouth daily at bedtime as needed for sleep        Follow up in 2 months

## 2019-11-18 NOTE — MISCELLANEOUS
Message  GI Reminder Recall [facility]:   Date: 01/17/2018   Dear Keely Gonzalez:     Review of our records shows you are due for the following: Follow Up Visit  Please call the following office to schedule your appointment:   [address] [place] [address], [address] [place] [address], [address] [place] [address] [phone]  We look forward to hearing from you! Sincerely,     [facility]      Signatures   Electronically signed by :  Santi Kirkpatrick, ; Jan 17 2018  9:08AM EST                       (Author)
decreased weight-shifting ability

## 2019-12-18 ENCOUNTER — OFFICE VISIT (OUTPATIENT)
Dept: PSYCHIATRY | Facility: CLINIC | Age: 29
End: 2019-12-18
Payer: COMMERCIAL

## 2019-12-18 DIAGNOSIS — F90.9 ATTENTION DEFICIT HYPERACTIVITY DISORDER (ADHD), UNSPECIFIED ADHD TYPE: ICD-10-CM

## 2019-12-18 DIAGNOSIS — G47.00 INSOMNIA, UNSPECIFIED TYPE: Primary | ICD-10-CM

## 2019-12-18 DIAGNOSIS — Z56.6 STRESS AT WORK: ICD-10-CM

## 2019-12-18 DIAGNOSIS — F33.2 SEVERE EPISODE OF RECURRENT MAJOR DEPRESSIVE DISORDER, WITHOUT PSYCHOTIC FEATURES (HCC): ICD-10-CM

## 2019-12-18 DIAGNOSIS — F33.8 SEASONAL AFFECTIVE DISORDER (HCC): ICD-10-CM

## 2019-12-18 DIAGNOSIS — F33.0 MAJOR DEPRESSIVE DISORDER, RECURRENT, MILD (HCC): ICD-10-CM

## 2019-12-18 PROCEDURE — 90833 PSYTX W PT W E/M 30 MIN: CPT | Performed by: PSYCHIATRY & NEUROLOGY

## 2019-12-18 PROCEDURE — 99214 OFFICE O/P EST MOD 30 MIN: CPT | Performed by: PSYCHIATRY & NEUROLOGY

## 2019-12-18 RX ORDER — BUPROPION HYDROCHLORIDE 100 MG/1
200 TABLET ORAL 2 TIMES DAILY
Qty: 360 TABLET | Refills: 1 | Status: SHIPPED | OUTPATIENT
Start: 2019-12-18 | End: 2020-05-08 | Stop reason: SDUPTHER

## 2019-12-18 RX ORDER — MELATONIN
1
COMMUNITY

## 2019-12-18 RX ORDER — TRAZODONE HYDROCHLORIDE 150 MG/1
150 TABLET ORAL
Qty: 90 TABLET | Refills: 1 | Status: SHIPPED | OUTPATIENT
Start: 2019-12-18 | End: 2020-05-08 | Stop reason: SDUPTHER

## 2019-12-18 SDOH — HEALTH STABILITY - MENTAL HEALTH: OTHER PHYSICAL AND MENTAL STRAIN RELATED TO WORK: Z56.6

## 2019-12-18 NOTE — BH TREATMENT PLAN
TREATMENT PLAN (Medication Management Only)        [facility]    Name and Date of Birth:  Tamar Nieves 34 y o  1990  Date of Treatment Plan: December 18, 2019  Diagnosis/Diagnoses:    1  Insomnia, unspecified type    2  Attention deficit hyperactivity disorder (ADHD), unspecified ADHD type    3  Major depressive disorder, recurrent, mild ([facility] [address] )    4  Stress at work    5  Seasonal affective disorder ([facility] )    6  Severe episode of recurrent major depressive disorder, without psychotic features [facility])      Strengths/Personal Resources for Self-Care: "Im good at my job, Im pretty self-aware with feelings/emotions, supportive family and friends", taking medications as prescribed, ability to listen, stable employment, well educated, willingness to work on problems, work skills  Area/Areas of need (in own words): lack of energy, lack of motivation, stress at work  1  Long Term Goal: follow up with sleep doctor and get up in the morning better  Target Date: 2 months - 2/18/2020  Person/Persons responsible for completion of goal: Dr Anna Wagner  2  Short Term Objective (s) - How will we reach this goal?:   A  Provider new recommended medication/dosage changes and/or continue medication(s): Medication changes: I have changed [place] Noguera's buPROPion  I am also having her maintain her levonorgestrel, fexofenadine, omeprazole, multivitamin, methocarbamol, promethazine, ALPRAZolam, cholecalciferol, and traZODone     B  N/A   C  N/A    Target Date: 3 months - 3/18/2020  Person/Persons Responsible for Completion of Goal: Dr Deedee Wagner Goals: limited progress  Treatment Modality: medication management every 3 months  Review due 90 to 120 days from date of this plan: 3 months - 3/18/2020  Expected length of service: ongoing treatment  My Physician/PA/NP and I have developed this plan together and I agree to work on the goals and objectives  I understand the treatment goals that were developed for my treatment

## 2019-12-18 NOTE — PATIENT INSTRUCTIONS
Major depressive disorder, recurrent, mild (HCC)    Stress at work    Seasonal affective disorder (HCC)  -     cholecalciferol (VITAMIN D3) 1,000 units tablet; Take 1 capsule by mouth  - 10,000 lux light for 30 mins in the morning 12 in from face    Severe episode of recurrent major depressive disorder, without psychotic features (HCC)  -     buPROPion (WELLBUTRIN) 100 mg tablet;  Take 2 tablets (200 mg total) by mouth 2 (two) times a day              Follow up in 3 months

## 2019-12-18 NOTE — PSYCH
Subjective:     Patient ID: Dylan Gloria is a 34 y o  female MDD, insomnia, and ADHD presenting for a follow up visit  She is currently on trazodone and wellbutrin  HPI ROS Appetite Changes and Sleep: The patient stated that things are going well for her  She believes that the medication is helping a lot  Her stress level is still high, work is stressful  "I think now it is seasonal depression more then severe depression " She has been taking Vitamin D3 for the last week or two to try and help  She denied a depressed mood, but more stressed  Spending around the holidays is also stressful and she is working on getting her debt paid off  She has started dating someone for the last 3 months or so  Things are going well  She denied SI/HI  Denied recent crying spells  Still losing weight post-op  She is thinking of surgery in the future to get the excess skin removed as she has been getting sores under her skin  Focus and concentration are okay  They could be better, but also could be worse  Appetite is better then it used to be  No longer feeling sick in the morning  She denied side effects  Still doing the online support group  Sleeping 8 hours of sleep with Trazodone  Review Of Systems:     Mood Anxiety   Behavior Normal    Thought Content Normal   General Emotional Problems and Sleep Disturbances   Personality Normal   Other Psych Symptoms Normal   Constitutional As Noted in HPI   ENT Negative   Cardiovascular Negative   Respiratory Negative   Gastrointestinal Negative   Genitourinary Negative   Musculoskeletal Negative   Integumentary Negative   Neurological Headache   Endocrine Normal    Other Symptoms Normal              Laboratory Results: No results found for this or any previous visit      Substance Abuse History:  Social History     Substance and Sexual Activity   Drug Use No       Family Psychiatric History:   Family History   Problem Relation Age of Onset    Melanoma Mother     No Known Problems Father     Other Maternal Grandmother         colitis    Diabetes Maternal Grandmother     Hypertension Maternal Grandmother     Stroke Paternal Grandfather     Heart disease Neg Hx     Thyroid disease Neg Hx        The following portions of the patient's history were reviewed and updated as appropriate: allergies, current medications, past family history, past medical history, past social history, past surgical history and problem list     Social History     Socioeconomic History    Marital status: Single     Spouse name: Not on file    Number of children: 0    Years of education: BA in social work    Highest education level: Not on file   Occupational History    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   Social Needs    Financial resource strain: Not on file    Food insecurity:     Worry: Not on file     Inability: Not on file   [employer] needs:     Medical: Not on file     Non-medical: Not on file   Tobacco Use    Smoking status: Never Smoker    Smokeless tobacco: Never Used   Substance and Sexual Activity    Alcohol use: Yes     Comment: occasional    Drug use: No    Sexual activity: Not Currently     Birth control/protection: IUD   Lifestyle    Physical activity:     Days per week: Not on file     Minutes per session: Not on file    Stress: Not on file   Relationships    Social connections:     Talks on phone: Not on file     Gets together: Not on file     Attends [employer] service: Not on file     Active member of club or organization: Not on file     Attends meetings of clubs or organizations: Not on file     Relationship status: Not on file    Intimate partner violence:     Fear of current or ex partner: Not on file     Emotionally abused: Not on file     Physically abused: Not on file     Forced sexual activity: Not on file   Other Topics Concern    Not on file   Social History Narrative    Not on file     Social History     Social History Narrative    Not on file       Objective:       Mental status:  Appearance calm and cooperative , adequate hygiene and grooming and good eye contact    Mood euthymic   Affect affect was constricted   Speech a normal rate   Thought Processes coherent/organized and normal thought processes   Hallucinations no hallucinations present    Thought Content no delusions   Abnormal Thoughts no suicidal thoughts  and no homicidal thoughts    Orientation  oriented to person and place and time   Remote Memory short term memory intact and long term memory intact   Attention Span concentration intact   Intellect Appears to be of Average Intelligence   Insight Insight intact   Judgement judgment was intact   Muscle Strength Muscle strength and tone were normal and Normal gait    Language no difficulty naming common objects, no difficulty repeating a phrase  and no difficulty writing a sentence    Fund of Knowledge displays adequate knowledge of current events, adequate fund of knowledge regarding past history and adequate fund of knowledge regarding vocabulary    Pain none   Pain Scale 0       Assessment/Plan:       Diagnoses and all orders for this visit:    Insomnia, unspecified type  -     traZODone (DESYREL) 150 mg tablet; Take 1 tablet (150 mg total) by mouth daily at bedtime as needed for sleep    Attention deficit hyperactivity disorder (ADHD), unspecified ADHD type  -     buPROPion (WELLBUTRIN) 100 mg tablet; Take 2 tablets (200 mg total) by mouth 2 (two) times a day    Major depressive disorder, recurrent, mild (HCC)    Stress at work    Seasonal affective disorder (HCC)  -     cholecalciferol (VITAMIN D3) 1,000 units tablet; Take 1 capsule by mouth  - 10,000 lux light for 30 mins in the morning 12 in from face    Severe episode of recurrent major depressive disorder, without psychotic features (HCC)  -     buPROPion (WELLBUTRIN) 100 mg tablet;  Take 2 tablets (200 mg total) by mouth 2 (two) times a day              Follow up in 3 months    Treatment Recommendations- Risks Benefits      Immediate Medical/Psychiatric/Psychotherapy Treatments and Any Precautions: doing better overall, but still stressed at work and with finances  She is working on paying down her debt and has a new boyfriend, which are improvements  Still having trouble getting out of bed and the winter and reduced sun light are possibly impacting this  Recommended light therapy as above  Increased wellbutrin  Risks, Benefits And Possible Side Effects Of Medications:  PSYCH RISK, BENEFITS AND POSSIBLE SIDE EFFECTS disucssed    Controlled Medication Discussion: Discussed with patient Black Box warning on concurrent use of benzodiazepines and opioid medications including sedation, respiratory depression, coma and death  Patient understands the risk of treatment with benzodiazepines in addition to opioids and wants to continue taking those medications  , Discussed with patient the risks of sedation, respiratory depression, impairment of ability to drive and potential for abuse and addiction related to treatment with benzodiazepine medications  The patient understands risk of treatment with benzodiazepine medications, agrees to not drive if feels impaired and agrees to take medications as prescribed  and The patient has been filling controlled prescriptions on time as prescribed to [facility] program       Psychotherapy Provided: Individual psychotherapy provided       Goals discussed in session: medications, stress, handling stress, relationship    Counseling provided: 20

## 2020-01-17 ENCOUNTER — OFFICE VISIT (OUTPATIENT)
Dept: FAMILY MEDICINE CLINIC | Facility: CLINIC | Age: 30
End: 2020-01-17
Payer: COMMERCIAL

## 2020-01-17 VITALS
DIASTOLIC BLOOD PRESSURE: 80 MMHG | BODY MASS INDEX: 25.21 KG/M2 | HEIGHT: 62 IN | WEIGHT: 137 LBS | HEART RATE: 84 BPM | SYSTOLIC BLOOD PRESSURE: 116 MMHG | OXYGEN SATURATION: 98 %

## 2020-01-17 DIAGNOSIS — K58.0 IRRITABLE BOWEL SYNDROME WITH DIARRHEA: ICD-10-CM

## 2020-01-17 DIAGNOSIS — L70.0 ACNE VULGARIS: Primary | ICD-10-CM

## 2020-01-17 DIAGNOSIS — F33.2 SEVERE EPISODE OF RECURRENT MAJOR DEPRESSIVE DISORDER, WITHOUT PSYCHOTIC FEATURES (HCC): ICD-10-CM

## 2020-01-17 PROCEDURE — 99214 OFFICE O/P EST MOD 30 MIN: CPT | Performed by: INTERNAL MEDICINE

## 2020-01-17 PROCEDURE — 3008F BODY MASS INDEX DOCD: CPT | Performed by: INTERNAL MEDICINE

## 2020-01-17 RX ORDER — CLINDAMYCIN AND BENZOYL PEROXIDE 10; 50 MG/G; MG/G
GEL TOPICAL 2 TIMES DAILY
Qty: 25 G | Refills: 0 | Status: SHIPPED | OUTPATIENT
Start: 2020-01-17 | End: 2021-11-11

## 2020-01-17 RX ORDER — HYOSCYAMINE SULFATE 0.125 MG
0.12 TABLET ORAL EVERY 4 HOURS PRN
Qty: 30 TABLET | Refills: 0
Start: 2020-01-17 | End: 2020-04-28 | Stop reason: SDUPTHER

## 2020-01-17 NOTE — PROGRESS NOTES
Assessment/Plan:    No problem-specific Assessment & Plan notes found for this encounter  Diagnoses and all orders for this visit:    Irritable bowel syndrome with diarrhea  -     hyoscyamine (ANASPAZ,LEVSIN) 0 125 MG tablet; Take 1 tablet (0 125 mg total) by mouth every 4 (four) hours as needed for cramping    FMLA form filled out  Copy was given to the patient  Patient will follow-up with  Gastroenterology  Acne vulgaris  -     clindamycin-benzoyl peroxide (BENZACLIN) gel; Apply topically 2 (two) times a day      Severe episode of recurrent major depressive disorder, without psychotic features (HCC)        Subjective:      Patient ID: Sebastien Nath is a 34 y o  female  HPI   patient history IBS depression disorder is here to follow-up on chronic medical problems and wants to discuss acne  She has been using over-the-counter acne treatment without much improvement  Patient is on IUD  Her depression is much better now  However, she still gets flares up of IBS necessitating her to use the bathroom several times in  an hour  She does use hyoscyamine to help with her cramps  She has not seen her gastroenterologist recently  This has been interfering with her employment  Would like to have fill out an FMLA form which was filled out in the office today    She has lost several pounds since the gastric surgery  The following portions of the patient's history were reviewed and updated as appropriate: allergies, current medications, past family history, past medical history, past social history, past surgical history and problem list     Review of Systems   Constitutional: Negative for chills and fever  Respiratory: Negative for cough and shortness of breath  Cardiovascular: Negative for chest pain, palpitations and leg swelling  Gastrointestinal:        As above   Skin: Positive for rash (Acne)  Psychiatric/Behavioral: Positive for dysphoric mood  The patient is nervous/anxious  Objective:      /80 (BP Location: Left arm, Patient Position: Sitting, Cuff Size: Standard)   Pulse 84   Ht 5' 2" (1 575 m)   Wt 62 1 kg (137 lb)   SpO2 98%   BMI 25 06 kg/m²          Physical Exam   HENT:   Left Ear: External ear normal    Minimal postnasal drainage   Cardiovascular: Normal rate, regular rhythm and normal heart sounds  No murmur heard  Pulmonary/Chest: Effort normal and breath sounds normal  No stridor  No respiratory distress  She has no wheezes  Abdominal: Soft  Bowel sounds are normal  She exhibits no distension and no mass  There is no tenderness  There is no guarding  Musculoskeletal: She exhibits no edema  Neurological: She is alert     Skin:   Acne around the jawline pustular in nature

## 2020-02-19 ENCOUNTER — OFFICE VISIT (OUTPATIENT)
Dept: FAMILY MEDICINE CLINIC | Facility: CLINIC | Age: 30
End: 2020-02-19
Payer: COMMERCIAL

## 2020-02-19 VITALS
SYSTOLIC BLOOD PRESSURE: 100 MMHG | WEIGHT: 134 LBS | BODY MASS INDEX: 24.66 KG/M2 | DIASTOLIC BLOOD PRESSURE: 70 MMHG | HEIGHT: 62 IN

## 2020-02-19 DIAGNOSIS — S13.9XXA NECK SPRAIN, INITIAL ENCOUNTER: Primary | ICD-10-CM

## 2020-02-19 DIAGNOSIS — R06.81 GERD WITH APNEA: ICD-10-CM

## 2020-02-19 DIAGNOSIS — K21.9 GERD WITH APNEA: ICD-10-CM

## 2020-02-19 PROCEDURE — 3008F BODY MASS INDEX DOCD: CPT | Performed by: PSYCHIATRY & NEUROLOGY

## 2020-02-19 PROCEDURE — 3008F BODY MASS INDEX DOCD: CPT | Performed by: INTERNAL MEDICINE

## 2020-02-19 PROCEDURE — 99213 OFFICE O/P EST LOW 20 MIN: CPT | Performed by: INTERNAL MEDICINE

## 2020-02-19 PROCEDURE — 1036F TOBACCO NON-USER: CPT | Performed by: INTERNAL MEDICINE

## 2020-02-19 RX ORDER — OMEPRAZOLE 40 MG/1
40 CAPSULE, DELAYED RELEASE ORAL DAILY
Qty: 90 CAPSULE | Refills: 0 | Status: SHIPPED | OUTPATIENT
Start: 2020-02-19 | End: 2020-04-28 | Stop reason: SDUPTHER

## 2020-02-19 RX ORDER — CYCLOBENZAPRINE HCL 10 MG
10 TABLET ORAL 3 TIMES DAILY PRN
Qty: 15 TABLET | Refills: 0 | Status: SHIPPED | OUTPATIENT
Start: 2020-02-19 | End: 2020-02-21

## 2020-02-19 RX ORDER — PREDNISONE 20 MG/1
20 TABLET ORAL DAILY
Qty: 5 TABLET | Refills: 0 | Status: SHIPPED | OUTPATIENT
Start: 2020-02-19 | End: 2020-05-08

## 2020-02-19 NOTE — PROGRESS NOTES
Assessment/Plan:         Diagnoses and all orders for this visit:    Neck sprain, initial encounter  -     cyclobenzaprine (FLEXERIL) 10 mg tablet; Take 1 tablet (10 mg total) by mouth 3 (three) times a day as needed for muscle spasms  -     predniSONE 20 mg tablet; Take 1 tablet (20 mg total) by mouth daily  Warm compresses gentle stretching  Caution advise with use of Flexeril  2 day work excuse    Subjective:      Patient ID: Tomi Cruz is a 34 y o  female  Neck Pain    This is a new problem  Episode onset: 3 days  The problem occurs constantly  The problem has been unchanged  The pain is associated with nothing  The pain is present in the midline  The pain is severe  The symptoms are aggravated by twisting, position and bending  The pain is same all the time  Pertinent negatives include no fever, headaches, numbness or tingling  She has tried muscle relaxants for the symptoms  The treatment provided mild relief  The following portions of the patient's history were reviewed and updated as appropriate: allergies, current medications, past medical history, past social history, past surgical history and problem list     Review of Systems   Constitutional: Negative for chills and fever  Musculoskeletal: Positive for neck pain and neck stiffness  Neurological: Negative for tingling, numbness and headaches  Objective:      /70 (BP Location: Right arm, Patient Position: Sitting, Cuff Size: Standard)   Ht 5' 2" (1 575 m)   Wt 60 8 kg (134 lb)   BMI 24 51 kg/m²          Physical Exam   Neck:   Increase trapezius muscle spasm   decreased range of motion   Neurological: She is alert

## 2020-02-19 NOTE — LETTER
February 19, 2020     Patient: Hema Perry   YOB: 1990   Date of Visit: 2/19/2020       To Whom it May Concern:    Hema Perry is under my professional care  She was seen in my office on 2/19/2020  She may return to work on 02/21/2020  If you have any questions or concerns, please don't hesitate to call           Sincerely,          Jose D Irvin MD        CC: Hema Perry

## 2020-02-21 DIAGNOSIS — S13.9XXA NECK SPRAIN, INITIAL ENCOUNTER: ICD-10-CM

## 2020-02-21 RX ORDER — CYCLOBENZAPRINE HCL 10 MG
TABLET ORAL
Qty: 15 TABLET | Refills: 0 | Status: SHIPPED | OUTPATIENT
Start: 2020-02-21 | End: 2020-04-28 | Stop reason: SDUPTHER

## 2020-03-12 ENCOUNTER — OFFICE VISIT (OUTPATIENT)
Dept: FAMILY MEDICINE CLINIC | Facility: CLINIC | Age: 30
End: 2020-03-12
Payer: COMMERCIAL

## 2020-03-12 VITALS
SYSTOLIC BLOOD PRESSURE: 110 MMHG | OXYGEN SATURATION: 97 % | HEART RATE: 90 BPM | DIASTOLIC BLOOD PRESSURE: 80 MMHG | TEMPERATURE: 99.1 F

## 2020-03-12 DIAGNOSIS — J02.9 PHARYNGITIS, UNSPECIFIED ETIOLOGY: Primary | ICD-10-CM

## 2020-03-12 PROCEDURE — 99213 OFFICE O/P EST LOW 20 MIN: CPT | Performed by: INTERNAL MEDICINE

## 2020-03-12 PROCEDURE — 1036F TOBACCO NON-USER: CPT | Performed by: INTERNAL MEDICINE

## 2020-03-12 RX ORDER — AMOXICILLIN 875 MG/1
875 TABLET, COATED ORAL 2 TIMES DAILY
Qty: 14 TABLET | Refills: 0 | Status: SHIPPED | OUTPATIENT
Start: 2020-03-12 | End: 2020-03-19

## 2020-03-12 NOTE — PROGRESS NOTES
Assessment/Plan:         Diagnoses and all orders for this visit:    Pharyngitis, unspecified etiology  -     amoxicillin (AMOXIL) 875 mg tablet; Take 1 tablet (875 mg total) by mouth 2 (two) times a day for 7 days    Increase fluid intake, rest, saline gargles, saline nasal irrigation, tylenol as tolerated  Follow up if symptoms getting worse or seek immediate medical help for emergency  Pt understands the plan  Work excuse given  Subjective:      Patient ID: Gely Long is a 34 y o  female  URI    This is a new problem  The current episode started in the past 7 days  The problem has been gradually worsening  The maximum temperature recorded prior to her arrival was 103 - 104 F  Associated symptoms include congestion, ear pain, sinus pain and a sore throat  Pertinent negatives include no coughing  The following portions of the patient's history were reviewed and updated as appropriate: allergies, current medications, past family history, past medical history, past social history, past surgical history and problem list     Review of Systems   Constitutional: Positive for fever (As above)  HENT: Positive for congestion, ear pain, sinus pain and sore throat  Respiratory: Negative for cough and shortness of breath  Objective: There were no vitals taken for this visit  Physical Exam   Constitutional:   Sounds congested   HENT:   Throat erythema   minimal sinus pressure on palpation   Eyes: Conjunctivae are normal    Cardiovascular: Normal rate, regular rhythm and normal heart sounds  Pulmonary/Chest: Effort normal and breath sounds normal  No stridor  No respiratory distress  She has no wheezes  Musculoskeletal: She exhibits no edema  Lymphadenopathy:     She has cervical adenopathy

## 2020-03-12 NOTE — LETTER
March 12, 2020     Patient: Lowell Gamez   YOB: 1990   Date of Visit: 3/12/2020       To Whom it May Concern:    Lowell Gamez is under my professional care  She was seen in my office on 3/12/2020  She may return to work on 03/18/2020  If you have any questions or concerns, please don't hesitate to call           Sincerely,          John Yoon MD        CC: Lowell Kenchiara

## 2020-04-28 DIAGNOSIS — F41.8 OTHER SPECIFIED ANXIETY DISORDERS: ICD-10-CM

## 2020-04-28 DIAGNOSIS — S13.9XXA NECK SPRAIN, INITIAL ENCOUNTER: ICD-10-CM

## 2020-04-28 DIAGNOSIS — K58.0 IRRITABLE BOWEL SYNDROME WITH DIARRHEA: ICD-10-CM

## 2020-04-28 DIAGNOSIS — K21.9 GERD WITH APNEA: ICD-10-CM

## 2020-04-28 DIAGNOSIS — R06.81 GERD WITH APNEA: ICD-10-CM

## 2020-04-29 RX ORDER — ALPRAZOLAM 0.25 MG/1
0.25 TABLET ORAL
Qty: 10 TABLET | Refills: 0 | Status: SHIPPED | OUTPATIENT
Start: 2020-04-29 | End: 2020-08-12

## 2020-04-29 RX ORDER — HYOSCYAMINE SULFATE 0.125 MG
0.12 TABLET ORAL EVERY 4 HOURS PRN
Qty: 30 TABLET | Refills: 0 | Status: SHIPPED | OUTPATIENT
Start: 2020-04-29

## 2020-04-29 RX ORDER — CYCLOBENZAPRINE HCL 10 MG
10 TABLET ORAL 3 TIMES DAILY PRN
Qty: 15 TABLET | Refills: 0 | Status: SHIPPED | OUTPATIENT
Start: 2020-04-29 | End: 2022-04-05

## 2020-04-29 RX ORDER — OMEPRAZOLE 40 MG/1
40 CAPSULE, DELAYED RELEASE ORAL DAILY
Qty: 90 CAPSULE | Refills: 0 | Status: SHIPPED | OUTPATIENT
Start: 2020-04-29 | End: 2020-07-30

## 2020-05-05 ENCOUNTER — TELEPHONE (OUTPATIENT)
Dept: FAMILY MEDICINE CLINIC | Facility: CLINIC | Age: 30
End: 2020-05-05

## 2020-05-08 ENCOUNTER — TELEMEDICINE (OUTPATIENT)
Dept: PSYCHIATRY | Facility: CLINIC | Age: 30
End: 2020-05-08
Payer: COMMERCIAL

## 2020-05-08 DIAGNOSIS — G47.00 INSOMNIA, UNSPECIFIED TYPE: ICD-10-CM

## 2020-05-08 DIAGNOSIS — F33.8 SEASONAL AFFECTIVE DISORDER (HCC): ICD-10-CM

## 2020-05-08 DIAGNOSIS — Z56.6 STRESS AT WORK: ICD-10-CM

## 2020-05-08 DIAGNOSIS — F90.9 ATTENTION DEFICIT HYPERACTIVITY DISORDER (ADHD), UNSPECIFIED ADHD TYPE: ICD-10-CM

## 2020-05-08 DIAGNOSIS — F41.8 OTHER SPECIFIED ANXIETY DISORDERS: ICD-10-CM

## 2020-05-08 DIAGNOSIS — F33.2 SEVERE EPISODE OF RECURRENT MAJOR DEPRESSIVE DISORDER, WITHOUT PSYCHOTIC FEATURES (HCC): Primary | ICD-10-CM

## 2020-05-08 PROCEDURE — 99214 OFFICE O/P EST MOD 30 MIN: CPT | Performed by: PSYCHIATRY & NEUROLOGY

## 2020-05-08 PROCEDURE — 90833 PSYTX W PT W E/M 30 MIN: CPT | Performed by: PSYCHIATRY & NEUROLOGY

## 2020-05-08 RX ORDER — BUPROPION HYDROCHLORIDE 100 MG/1
200 TABLET ORAL 2 TIMES DAILY
Qty: 360 TABLET | Refills: 1 | Status: SHIPPED | OUTPATIENT
Start: 2020-05-08 | End: 2020-08-12

## 2020-05-08 RX ORDER — TRAZODONE HYDROCHLORIDE 150 MG/1
75 TABLET ORAL
Qty: 45 TABLET | Refills: 1 | Status: SHIPPED | OUTPATIENT
Start: 2020-05-08 | End: 2020-08-12 | Stop reason: SDUPTHER

## 2020-05-08 SDOH — HEALTH STABILITY - MENTAL HEALTH: OTHER PHYSICAL AND MENTAL STRAIN RELATED TO WORK: Z56.6

## 2020-05-11 ENCOUNTER — TELEMEDICINE (OUTPATIENT)
Dept: FAMILY MEDICINE CLINIC | Facility: CLINIC | Age: 30
End: 2020-05-11
Payer: COMMERCIAL

## 2020-05-11 DIAGNOSIS — Z20.828 EXPOSURE TO SARS-ASSOCIATED CORONAVIRUS: ICD-10-CM

## 2020-05-11 DIAGNOSIS — Z20.828 EXPOSURE TO SARS-ASSOCIATED CORONAVIRUS: Primary | ICD-10-CM

## 2020-05-11 PROCEDURE — U0003 INFECTIOUS AGENT DETECTION BY NUCLEIC ACID (DNA OR RNA); SEVERE ACUTE RESPIRATORY SYNDROME CORONAVIRUS 2 (SARS-COV-2) (CORONAVIRUS DISEASE [COVID-19]), AMPLIFIED PROBE TECHNIQUE, MAKING USE OF HIGH THROUGHPUT TECHNOLOGIES AS DESCRIBED BY CMS-2020-01-R: HCPCS

## 2020-05-11 PROCEDURE — 99213 OFFICE O/P EST LOW 20 MIN: CPT | Performed by: INTERNAL MEDICINE

## 2020-05-12 LAB — SARS-COV-2 RNA RESP QL NAA+PROBE: NEGATIVE

## 2020-05-14 ENCOUNTER — TELEPHONE (OUTPATIENT)
Dept: FAMILY MEDICINE CLINIC | Facility: CLINIC | Age: 30
End: 2020-05-14

## 2020-05-26 ENCOUNTER — TELEPHONE (OUTPATIENT)
Dept: PSYCHIATRY | Facility: CLINIC | Age: 30
End: 2020-05-26

## 2020-07-13 ENCOUNTER — HOSPITAL ENCOUNTER (EMERGENCY)
Facility: HOSPITAL | Age: 30
Discharge: HOME | End: 2020-07-13
Attending: EMERGENCY MEDICINE | Admitting: EMERGENCY MEDICINE
Payer: COMMERCIAL

## 2020-07-13 VITALS
SYSTOLIC BLOOD PRESSURE: 130 MMHG | BODY MASS INDEX: 22.94 KG/M2 | RESPIRATION RATE: 18 BRPM | DIASTOLIC BLOOD PRESSURE: 67 MMHG | HEART RATE: 79 BPM | OXYGEN SATURATION: 100 % | TEMPERATURE: 97.2 F | WEIGHT: 125.44 LBS

## 2020-07-13 DIAGNOSIS — R11.0 NAUSEA: ICD-10-CM

## 2020-07-13 DIAGNOSIS — F41.9 ANXIETY: Primary | ICD-10-CM

## 2020-07-13 LAB
BACTERIA UR QL AUTO: ABNORMAL /HPF
BILIRUB UR QL STRIP: NEGATIVE
CLARITY UR: CLEAR
COLOR UR: YELLOW
COLOR, POC: YELLOW
EXT PREG TEST URINE: NEGATIVE
EXT. CONTROL ED NAV: NORMAL
GLUCOSE UR STRIP-MCNC: NEGATIVE MG/DL
HGB UR QL STRIP.AUTO: ABNORMAL
KETONES UR STRIP-MCNC: ABNORMAL MG/DL
LEUKOCYTE ESTERASE UR QL STRIP: NEGATIVE
MUCOUS THREADS UR QL AUTO: ABNORMAL
NITRITE UR QL STRIP: NEGATIVE
NON-SQ EPI CELLS URNS QL MICRO: ABNORMAL /HPF
PH UR STRIP.AUTO: 6 [PH] (ref 4.5–8)
PROT UR STRIP-MCNC: NEGATIVE MG/DL
RBC #/AREA URNS AUTO: ABNORMAL /HPF
SP GR UR STRIP.AUTO: 1.01 (ref 1–1.03)
UROBILINOGEN UR QL STRIP.AUTO: 0.2 E.U./DL
WBC #/AREA URNS AUTO: ABNORMAL /HPF

## 2020-07-13 PROCEDURE — 81001 URINALYSIS AUTO W/SCOPE: CPT

## 2020-07-13 PROCEDURE — 99284 EMERGENCY DEPT VISIT MOD MDM: CPT | Performed by: EMERGENCY MEDICINE

## 2020-07-13 PROCEDURE — 81025 URINE PREGNANCY TEST: CPT | Performed by: EMERGENCY MEDICINE

## 2020-07-13 PROCEDURE — 99283 EMERGENCY DEPT VISIT LOW MDM: CPT

## 2020-07-13 RX ORDER — ONDANSETRON 4 MG/1
4 TABLET, ORALLY DISINTEGRATING ORAL EVERY 6 HOURS PRN
Qty: 20 TABLET | Refills: 0 | Status: SHIPPED | OUTPATIENT
Start: 2020-07-13 | End: 2020-08-12 | Stop reason: SDUPTHER

## 2020-07-13 RX ORDER — DIAZEPAM 5 MG/1
5 TABLET ORAL ONCE
Status: COMPLETED | OUTPATIENT
Start: 2020-07-13 | End: 2020-07-13

## 2020-07-13 RX ORDER — ONDANSETRON 4 MG/1
4 TABLET, ORALLY DISINTEGRATING ORAL ONCE
Status: COMPLETED | OUTPATIENT
Start: 2020-07-13 | End: 2020-07-13

## 2020-07-13 RX ADMIN — DIAZEPAM 5 MG: 5 TABLET ORAL at 16:04

## 2020-07-13 RX ADMIN — ONDANSETRON 4 MG: 4 TABLET, ORALLY DISINTEGRATING ORAL at 16:03

## 2020-07-13 NOTE — ED PROVIDER NOTES
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 and is very busy at work  States that she does see a psychiatrist and a therapist outside of the hospital   Patient states that she has not eaten a full meal since Wednesday  Patient only experiences nausea when she tries to force herself to eat  She has also been experiencing nonbloody, non melanotic diarrhea consistent with previous anxiety attacks  Denies fever/chills, vomiting, lightheadedness/dizziness, numbness/weakness, headache, change in vision, URI symptoms, neck pain, chest pain, palpitations, shortness of breath, cough, back pain, flank pain, abdominal pain, hematochezia, melena, dysuria, hematuria, abnormal vaginal discharge/bleeding  Prior to Admission Medications   Prescriptions Last Dose Informant Patient Reported? Taking?    ALPRAZolam (XANAX) 0 25 mg tablet   No No   Sig: Take 1 tablet (0 25 mg total) by mouth daily at bedtime as needed for anxiety   Multiple Vitamin (MULTIVITAMIN) tablet   Yes No   Sig: Take 1 tablet by mouth daily   buPROPion (WELLBUTRIN) 100 mg tablet   No No   Sig: Take 2 tablets (200 mg total) by mouth 2 (two) times a day   cholecalciferol (VITAMIN D3) 1,000 units tablet   Yes No   Sig: Take 1 capsule by mouth   clindamycin-benzoyl peroxide (BENZACLIN) gel   No No   Sig: Apply topically 2 (two) times a day   cyclobenzaprine (FLEXERIL) 10 mg tablet   No No   Sig: Take 1 tablet (10 mg total) by mouth 3 (three) times a day as needed for muscle spasms   fexofenadine (ALLEGRA) 60 MG tablet  Self Yes No   Sig: one by mouth daily prn   hyoscyamine (ANASPAZ,LEVSIN) 0 125 MG tablet   No No   Sig: Take 1 tablet (0 125 mg total) by mouth every 4 (four) hours as needed for cramping   levonorgestrel (MIRENA) 20 MCG/24HR IUD  Self Yes No   Si each by Intrauterine route   omeprazole (PriLOSEC) 40 MG capsule   No No   Sig: Take 1 capsule (40 mg total) by mouth daily   traZODone (DESYREL) 150 mg tablet   No No   Sig: Take 0 5 tablets (75 mg total) by mouth daily at bedtime as needed for sleep      Facility-Administered Medications: None       Past Medical History:   Diagnosis Date    Abdominal pain     Abnormal CAT scan     Anxiety     Anxiety     Blood in stool     Candidiasis of skin     Chronic diarrhea     Chronic fatigue     Colitis     Community acquired bacterial pneumonia     Depression     Dyspepsia     GERD (gastroesophageal reflux disease)     Hemorrhoids     Herpes labialis     IBS (irritable bowel syndrome)     Left ovarian cyst     Morbid obesity (HCC)     Nausea & vomiting     Sleep pattern disturbance     Strain of thoracic spine, initial encounter        Past Surgical History:   Procedure Laterality Date    EGD AND COLONOSCOPY N/A 2017    Procedure: EGD AND COLONOSCOPY;  Surgeon: Natali Frey MD;  Location: [facility]; Service: Gastroenterology    WISDOM TOOTH EXTRACTION      WRIST SURGERY         Family History   Problem Relation Age of Onset   Earna Rukhsana Melanoma Mother     No Known Problems Father     Other Maternal Grandmother         colitis    Diabetes Maternal Grandmother     Hypertension Maternal Grandmother     Stroke Paternal Grandfather     Heart disease Neg Hx     Thyroid disease Neg Hx      I have reviewed and agree with the history as documented      E-Cigarette/Vaping     E-Cigarette/Vaping Substances     Social History     Tobacco Use    Smoking status: Never Smoker    Smokeless tobacco: Never Used   Substance Use Topics    Alcohol use: Yes     Comment: occasional    Drug use: No       Review of Systems   Constitutional: Negative for chills and fever  HENT: Negative for rhinorrhea, sore throat and trouble swallowing  Eyes: Negative for photophobia and visual disturbance  Respiratory: Negative for cough, chest tightness and shortness of breath  Cardiovascular: Negative for chest pain, palpitations and leg swelling  Gastrointestinal: Positive for diarrhea and nausea  Negative for abdominal pain, blood in stool and vomiting  Endocrine: Negative for polyuria  Genitourinary: Negative for dysuria, flank pain, hematuria, vaginal bleeding and vaginal discharge  Musculoskeletal: Negative for back pain and neck pain  Skin: Negative for color change and rash  Allergic/Immunologic: Negative for immunocompromised state  Neurological: Negative for dizziness, weakness, light-headedness, numbness and headaches  Psychiatric/Behavioral: The patient is nervous/anxious  All other systems reviewed and are negative  Physical Exam  Physical Exam   Constitutional: Vital signs are normal  She appears well-developed  She is cooperative  No distress  HENT:   Mouth/Throat: Uvula is midline, oropharynx is clear and moist and mucous membranes are normal    Eyes: Pupils are equal, round, and reactive to light  Conjunctivae and EOM are normal    Neck: Trachea normal  No thyroid mass and no thyromegaly present  Cardiovascular: Normal rate, regular rhythm, normal heart sounds, intact distal pulses and normal pulses  No murmur heard  Pulmonary/Chest: Effort normal and breath sounds normal    Abdominal: Soft  Normal appearance and bowel sounds are normal  There is no tenderness  There is no rebound, no guarding and no CVA tenderness  Neurological: She is alert  Skin: Skin is warm, dry and intact  Psychiatric: She has a normal mood and affect   Her speech is normal and behavior is normal  Thought content normal        Vital Signs  ED Triage Vitals [07/13/20 1455]   Temperature Pulse Respirations Blood Pressure SpO2   (!) 97 2 °F (36 2 °C) 79 18 130/67 100 %      Temp Source Heart Rate Source Patient Position - Orthostatic VS BP Location FiO2 (%)   Temporal -- -- -- --      Pain Score       --           Vitals:    07/13/20 1455   BP: 130/67   Pulse: 79         Visual Acuity      ED Medications  Medications   ondansetron (ZOFRAN-ODT) dispersible tablet 4 mg (4 mg Oral Given 7/13/20 1603)   diazepam (VALIUM) tablet 5 mg (5 mg Oral Given 7/13/20 1604)       Diagnostic Studies  Results Reviewed     Procedure Component Value Units Date/Time    Urine Microscopic [[id number]] Collected:  07/13/20 1546    Lab Status: In process Specimen:  Urine, Clean Catch Updated:  07/13/20 1553    POCT pregnancy, urine [[id number]]  (Normal) Resulted:  07/13/20 1549    Lab Status:  Final result Updated:  07/13/20 1549     EXT PREG TEST UR (Ref: Negative) negative     Control valid    POCT urinalysis dipstick [[id number]]  (Abnormal) Resulted:  07/13/20 1549    Lab Status:  Final result Specimen:  Urine Updated:  07/13/20 1549     Color, UA yellow    Urine Macroscopic, POC [[id number]]  (Abnormal) Collected:  07/13/20 1546    Lab Status:  Final result Specimen:  Urine Updated:  07/13/20 1548     Color, UA Yellow     Clarity, UA Clear     pH, UA 6 0     Leukocytes, UA Negative     Nitrite, UA Negative     Protein, UA Negative mg/dl      Glucose, UA Negative mg/dl      Ketones, UA 40 (2+) mg/dl      Urobilinogen, UA 0 2 E U /dl      Bilirubin, UA Negative     Blood, UA Small     Specific [place], UA 1 015    Narrative:       CLINITEK RESULT                 No orders to display              Procedures  Procedures         ED Course                                             MDM  Number of Diagnoses or Management Options  Diagnosis management comments: Plan to check urinalysis and urine pregnancy  Will treat symptomatically with Valium and Zofran    Patient should follow up with therapist and psychiatrist         Disposition  Final diagnoses:   Anxiety   Nausea     Time reflects when diagnosis was documented in both MDM as applicable and the Disposition within this note     Time User Action Codes Description Comment    7/13/2020  4:40 PM Jaqui Garnerville Add [F41 9] Anxiety     7/13/2020  4:40 PM Jaqui Garnerville Add [R11 0] Nausea       ED Disposition     ED Disposition Condition Date/Time Comment    Discharge Stable Mon Jul 13, 2020  4:40 PM Viviana Risk discharge to home/self care  Follow-up Information     Follow up With Specialties Details Why Contact Info Additional Via Bere Harris MD Internal Medicine Schedule an appointment as soon as possible for a visit   [facility] [address]  [address]  [address] [facility] Emergency Department Emergency Medicine Go to  If symptoms worsen [facility] [id number]  [phone] [place] [facility], [address], [place], [address]          Patient's Medications   Discharge Prescriptions    ONDANSETRON (ZOFRAN-ODT) 4 MG DISINTEGRATING TABLET    Take 1 tablet (4 mg total) by mouth every 6 (six) hours as needed for nausea       Start Date: 7/13/2020 End Date: --       Order Dose: 4 mg       Quantity: 20 tablet    Refills: 0     No discharge procedures on file      PDMP Review       Value Time User    PDMP Reviewed  Yes 4/29/2020 10:39 AM Viky Bustos MD          ED Provider  Electronically Signed by           Broderick Plunkett MD  07/13/20 5767

## 2020-07-30 DIAGNOSIS — K21.9 GERD WITH APNEA: ICD-10-CM

## 2020-07-30 DIAGNOSIS — R06.81 GERD WITH APNEA: ICD-10-CM

## 2020-07-30 RX ORDER — OMEPRAZOLE 40 MG/1
CAPSULE, DELAYED RELEASE ORAL
Qty: 90 CAPSULE | Refills: 0 | Status: SHIPPED | OUTPATIENT
Start: 2020-07-30 | End: 2020-12-01

## 2020-08-12 ENCOUNTER — TELEPHONE (OUTPATIENT)
Dept: PSYCHIATRY | Facility: CLINIC | Age: 30
End: 2020-08-12

## 2020-08-12 ENCOUNTER — TELEMEDICINE (OUTPATIENT)
Dept: PSYCHIATRY | Facility: CLINIC | Age: 30
End: 2020-08-12
Payer: COMMERCIAL

## 2020-08-12 DIAGNOSIS — R11.0 NAUSEA: ICD-10-CM

## 2020-08-12 DIAGNOSIS — F41.8 OTHER SPECIFIED ANXIETY DISORDERS: Primary | ICD-10-CM

## 2020-08-12 DIAGNOSIS — F33.2 SEVERE EPISODE OF RECURRENT MAJOR DEPRESSIVE DISORDER, WITHOUT PSYCHOTIC FEATURES (HCC): ICD-10-CM

## 2020-08-12 DIAGNOSIS — F33.8 SEASONAL AFFECTIVE DISORDER (HCC): ICD-10-CM

## 2020-08-12 DIAGNOSIS — G47.00 INSOMNIA, UNSPECIFIED TYPE: ICD-10-CM

## 2020-08-12 PROCEDURE — 90832 PSYTX W PT 30 MINUTES: CPT | Performed by: PSYCHIATRY & NEUROLOGY

## 2020-08-12 PROCEDURE — 99214 OFFICE O/P EST MOD 30 MIN: CPT | Performed by: PSYCHIATRY & NEUROLOGY

## 2020-08-12 PROCEDURE — 1036F TOBACCO NON-USER: CPT | Performed by: PSYCHIATRY & NEUROLOGY

## 2020-08-12 RX ORDER — ONDANSETRON 4 MG/1
4 TABLET, ORALLY DISINTEGRATING ORAL EVERY 6 HOURS PRN
Qty: 20 TABLET | Refills: 0 | Status: SHIPPED | OUTPATIENT
Start: 2020-08-12 | End: 2022-01-04 | Stop reason: SDUPTHER

## 2020-08-12 RX ORDER — DIAZEPAM 5 MG/1
5 TABLET ORAL EVERY 8 HOURS PRN
Qty: 30 TABLET | Refills: 1 | Status: SHIPPED | OUTPATIENT
Start: 2020-08-12 | End: 2020-12-15

## 2020-08-12 RX ORDER — FLUOXETINE 10 MG/1
10 CAPSULE ORAL DAILY
Qty: 60 CAPSULE | Refills: 1 | Status: SHIPPED | OUTPATIENT
Start: 2020-08-12 | End: 2020-11-17

## 2020-08-12 RX ORDER — TRAZODONE HYDROCHLORIDE 150 MG/1
225 TABLET ORAL
Qty: 90 TABLET | Refills: 1 | Status: SHIPPED | OUTPATIENT
Start: 2020-08-12 | End: 2020-08-14 | Stop reason: SDUPTHER

## 2020-08-12 NOTE — TELEPHONE ENCOUNTER
Pharmacy called stating insurance requires 90 day supply    Please resend script to pharmacy for TRAZODONE

## 2020-08-12 NOTE — PATIENT INSTRUCTIONS
Other specified anxiety disorders  -     FLUoxetine (PROzac) 10 mg capsule; Take 1 capsule (10 mg total) by mouth daily Increase to 20mg in two weeks  -     diazepam (VALIUM) 5 mg tablet; Take 1 tablet (5 mg total) by mouth every 8 (eight) hours as needed for anxiety    Severe episode of recurrent major depressive disorder, without psychotic features (HCC)  -     FLUoxetine (PROzac) 10 mg capsule; Take 1 capsule (10 mg total) by mouth daily Increase to 20mg in two weeks    Seasonal affective disorder (HCC)  -     FLUoxetine (PROzac) 10 mg capsule; Take 1 capsule (10 mg total) by mouth daily Increase to 20mg in two weeks    Insomnia, unspecified type  -     traZODone (DESYREL) 150 mg tablet; Take 1 5 tablets (225 mg total) by mouth daily at bedtime as needed for sleep    Nausea  -     ondansetron (ZOFRAN-ODT) 4 mg disintegrating tablet; Take 1 tablet (4 mg total) by mouth every 6 (six) hours as needed for nausea        Wean wellbutrin to 200mg in the morning for 1 week, then 100mg in the morning for 1 week, then stop
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
## 2020-08-14 DIAGNOSIS — G47.00 INSOMNIA, UNSPECIFIED TYPE: ICD-10-CM

## 2020-08-14 RX ORDER — TRAZODONE HYDROCHLORIDE 150 MG/1
225 TABLET ORAL
Qty: 135 TABLET | Refills: 1 | Status: SHIPPED | OUTPATIENT
Start: 2020-08-14 | End: 2021-02-15

## 2020-08-18 DIAGNOSIS — R42 DIZZINESS: Primary | ICD-10-CM

## 2020-08-18 DIAGNOSIS — Z98.890 STATUS POST GASTRIC SURGERY: ICD-10-CM

## 2020-08-20 LAB
ALBUMIN SERPL-MCNC: 4.5 G/DL (ref 3.6–5.1)
ALBUMIN/GLOB SERPL: 1.9 (CALC) (ref 1–2.5)
ALP SERPL-CCNC: 56 U/L (ref 31–125)
ALT SERPL-CCNC: 7 U/L (ref 6–29)
AST SERPL-CCNC: 12 U/L (ref 10–30)
BASOPHILS # BLD AUTO: 29 CELLS/UL (ref 0–200)
BASOPHILS NFR BLD AUTO: 0.5 %
BILIRUB SERPL-MCNC: 1.4 MG/DL (ref 0.2–1.2)
BUN SERPL-MCNC: 13 MG/DL (ref 7–25)
BUN/CREAT SERPL: ABNORMAL (CALC) (ref 6–22)
CALCIUM SERPL-MCNC: 9.6 MG/DL (ref 8.6–10.2)
CHLORIDE SERPL-SCNC: 104 MMOL/L (ref 98–110)
CHOLEST SERPL-MCNC: 146 MG/DL
CHOLEST/HDLC SERPL: 2.9 (CALC)
CO2 SERPL-SCNC: 27 MMOL/L (ref 20–32)
CREAT SERPL-MCNC: 0.89 MG/DL (ref 0.5–1.1)
EOSINOPHIL # BLD AUTO: 23 CELLS/UL (ref 15–500)
EOSINOPHIL NFR BLD AUTO: 0.4 %
ERYTHROCYTE [DISTWIDTH] IN BLOOD BY AUTOMATED COUNT: 12.4 % (ref 11–15)
GLOBULIN SER CALC-MCNC: 2.4 G/DL (CALC) (ref 1.9–3.7)
GLUCOSE SERPL-MCNC: 86 MG/DL (ref 65–99)
HCT VFR BLD AUTO: 44.3 % (ref 35–45)
HDLC SERPL-MCNC: 50 MG/DL
HGB BLD-MCNC: 14.7 G/DL (ref 11.7–15.5)
LDLC SERPL CALC-MCNC: 83 MG/DL (CALC)
LYMPHOCYTES # BLD AUTO: 1659 CELLS/UL (ref 850–3900)
LYMPHOCYTES NFR BLD AUTO: 29.1 %
MCH RBC QN AUTO: 30.2 PG (ref 27–33)
MCHC RBC AUTO-ENTMCNC: 33.2 G/DL (ref 32–36)
MCV RBC AUTO: 91 FL (ref 80–100)
MONOCYTES # BLD AUTO: 319 CELLS/UL (ref 200–950)
MONOCYTES NFR BLD AUTO: 5.6 %
NEUTROPHILS # BLD AUTO: 3671 CELLS/UL (ref 1500–7800)
NEUTROPHILS NFR BLD AUTO: 64.4 %
NONHDLC SERPL-MCNC: 96 MG/DL (CALC)
PLATELET # BLD AUTO: 290 THOUSAND/UL (ref 140–400)
PMV BLD REES-ECKER: 11 FL (ref 7.5–12.5)
POTASSIUM SERPL-SCNC: 3.8 MMOL/L (ref 3.5–5.3)
PROT SERPL-MCNC: 6.9 G/DL (ref 6.1–8.1)
RBC # BLD AUTO: 4.87 MILLION/UL (ref 3.8–5.1)
SL AMB EGFR AFRICAN AMERICAN: 102 ML/MIN/1.73M2
SL AMB EGFR NON AFRICAN AMERICAN: 88 ML/MIN/1.73M2
SODIUM SERPL-SCNC: 139 MMOL/L (ref 135–146)
TRIGL SERPL-MCNC: 46 MG/DL
TSH SERPL-ACNC: 0.76 MIU/L
WBC # BLD AUTO: 5.7 THOUSAND/UL (ref 3.8–10.8)

## 2020-09-15 ENCOUNTER — TELEPHONE (OUTPATIENT)
Dept: FAMILY MEDICINE CLINIC | Facility: CLINIC | Age: 30
End: 2020-09-15

## 2020-09-15 NOTE — TELEPHONE ENCOUNTER
Pt is scheduled to be seen tomorrow  Pt called in complaining of loss off appetite and 10 pound weight loss within a week

## 2020-09-16 ENCOUNTER — OFFICE VISIT (OUTPATIENT)
Dept: FAMILY MEDICINE CLINIC | Facility: CLINIC | Age: 30
End: 2020-09-16
Payer: COMMERCIAL

## 2020-09-16 VITALS
TEMPERATURE: 98.1 F | HEIGHT: 62 IN | WEIGHT: 119 LBS | BODY MASS INDEX: 21.9 KG/M2 | HEART RATE: 71 BPM | OXYGEN SATURATION: 98 % | SYSTOLIC BLOOD PRESSURE: 90 MMHG | DIASTOLIC BLOOD PRESSURE: 64 MMHG

## 2020-09-16 DIAGNOSIS — G89.29 CHRONIC BILATERAL THORACIC BACK PAIN: ICD-10-CM

## 2020-09-16 DIAGNOSIS — Z98.84 BARIATRIC SURGERY STATUS: ICD-10-CM

## 2020-09-16 DIAGNOSIS — B37.2 CANDIDIASIS, INTERTRIGO: ICD-10-CM

## 2020-09-16 DIAGNOSIS — R13.12 OROPHARYNGEAL DYSPHAGIA: Primary | ICD-10-CM

## 2020-09-16 DIAGNOSIS — M54.6 CHRONIC BILATERAL THORACIC BACK PAIN: ICD-10-CM

## 2020-09-16 PROCEDURE — 99214 OFFICE O/P EST MOD 30 MIN: CPT | Performed by: INTERNAL MEDICINE

## 2020-09-16 RX ORDER — NYSTATIN 100000 [USP'U]/G
POWDER TOPICAL 2 TIMES DAILY
Qty: 30 G | Refills: 0 | Status: SHIPPED | OUTPATIENT
Start: 2020-09-16 | End: 2020-11-18

## 2020-09-16 NOTE — PROGRESS NOTES
Assessment/Plan:         Diagnoses and all orders for this visit:    Oropharyngeal dysphagia  -     Ambulatory referral to Gastroenterology; Future  Patient has history of gastric sleeve surgery  Concerning for dysphagia given weight loss difficulty with swallowing poor appetite  GI consultation as above  Bariatric surgery status  Surgeon has retired  Chronic bilateral thoracic back pain  -     XR spine lumbar minimum 4 views non injury; Future  -     XR spine thoracic 3 vw; Future  -     C-reactive protein; Future  -     HLA-B27 antigen; Future  Patient's prolonged morning stiffness with history of colitis is concerning for reactive arthropathy  Lab studies and x-ray results ordered as above  Candidiasis, intertrigo  -     nystatin (MYCOSTATIN) powder; Apply topically 2 (two) times a day      Colitis  Not active  Follow-up with GI  Subjective:      Patient ID: Jennie Alonso is a 34 y o  female  HPI  Patient history of gastric sleeve surgery is here with concerns of poor appetite difficulty with swallowing and a significant amount of weight loss about 10 lb in a matter of few weeks on her home scale  Recent lab studies were unremarkable  Patient does not report any dyspepsia and has been taking omeprazole on a regular basis  Patient also complains of low back pain  She reports increased stiffness in the morning last several hours  Patient has history of pan colitis  She has not seen GI doctor recently  She reports her bowels to be normal and has not seen any blood or her mucus in the stool  Recent TSH was normal   CT scan of the abdomen pelvis couple of years ago was unremarkable  Also complains of a rash in her belly button as well as lower abdomen that is pruritic itchy but does not have it at this time  I suspect she is describing candidiasis intertrigo    The following portions of the patient's history were reviewed and updated as appropriate: allergies, current medications, past family history, past medical history, past social history, past surgical history and problem list     Review of Systems   Constitutional: Negative for chills and fever  Respiratory: Negative for cough and shortness of breath  Cardiovascular: Negative for chest pain, palpitations and leg swelling  Gastrointestinal:        As above   Musculoskeletal: Positive for back pain  As above   Skin:        As above   Neurological: Negative for numbness  Objective:      BP 90/64 (BP Location: Left arm, Patient Position: Sitting, Cuff Size: Standard)   Pulse 71   Temp 98 1 °F (36 7 °C)   Ht 5' 2" (1 575 m)   Wt 54 kg (119 lb)   SpO2 98%   BMI 21 77 kg/m²          Physical Exam  Constitutional:       General: She is not in acute distress  Appearance: She is not ill-appearing or diaphoretic  Abdominal:      General: Abdomen is flat  Bowel sounds are normal  There is no distension  Palpations: Abdomen is soft  There is no mass  Tenderness: There is no abdominal tenderness  There is no guarding or rebound  Hernia: No hernia is present  Lymphadenopathy:      Cervical: No cervical adenopathy  Skin:     Coloration: Skin is not jaundiced or pale  Neurological:      Mental Status: She is alert and oriented to person, place, and time  Comments: Tremors but no cogwheel rigidity   Psychiatric:         Mood and Affect: Mood normal          Thought Content:  Thought content normal

## 2020-09-17 ENCOUNTER — APPOINTMENT (OUTPATIENT)
Dept: LAB | Age: 30
End: 2020-09-17
Payer: COMMERCIAL

## 2020-09-17 ENCOUNTER — APPOINTMENT (OUTPATIENT)
Dept: RADIOLOGY | Age: 30
End: 2020-09-17
Payer: COMMERCIAL

## 2020-09-17 DIAGNOSIS — M54.6 CHRONIC BILATERAL THORACIC BACK PAIN: ICD-10-CM

## 2020-09-17 DIAGNOSIS — G89.29 CHRONIC BILATERAL THORACIC BACK PAIN: ICD-10-CM

## 2020-09-17 PROCEDURE — 72110 X-RAY EXAM L-2 SPINE 4/>VWS: CPT

## 2020-09-17 PROCEDURE — 72072 X-RAY EXAM THORAC SPINE 3VWS: CPT

## 2020-09-17 PROCEDURE — 36415 COLL VENOUS BLD VENIPUNCTURE: CPT

## 2020-09-19 LAB
CRP SERPL-MCNC: 0.4 MG/L
HLA-B27 QL FC: NEGATIVE

## 2020-09-22 DIAGNOSIS — M25.50 ARTHRALGIA, UNSPECIFIED JOINT: Primary | ICD-10-CM

## 2020-09-22 DIAGNOSIS — K52.9 COLITIS: ICD-10-CM

## 2020-09-28 ENCOUNTER — OFFICE VISIT (OUTPATIENT)
Dept: GASTROENTEROLOGY | Facility: MEDICAL CENTER | Age: 30
End: 2020-09-28
Payer: COMMERCIAL

## 2020-09-28 VITALS
HEART RATE: 80 BPM | BODY MASS INDEX: 21.53 KG/M2 | WEIGHT: 117 LBS | DIASTOLIC BLOOD PRESSURE: 60 MMHG | SYSTOLIC BLOOD PRESSURE: 100 MMHG | TEMPERATURE: 99.1 F | HEIGHT: 62 IN

## 2020-09-28 DIAGNOSIS — K52.9 CHRONIC DIARRHEA: ICD-10-CM

## 2020-09-28 DIAGNOSIS — K21.00 GASTROESOPHAGEAL REFLUX DISEASE WITH ESOPHAGITIS: Primary | ICD-10-CM

## 2020-09-28 DIAGNOSIS — R13.10 DYSPHAGIA, UNSPECIFIED TYPE: ICD-10-CM

## 2020-09-28 PROCEDURE — 1036F TOBACCO NON-USER: CPT | Performed by: INTERNAL MEDICINE

## 2020-09-28 PROCEDURE — 99214 OFFICE O/P EST MOD 30 MIN: CPT | Performed by: INTERNAL MEDICINE

## 2020-09-28 NOTE — PROGRESS NOTES
[facility] - Outpatient Follow-up Note  Kaila Ortiz 34 y o  female MRN: [id number]  Encounter: [id number]          ASSESSMENT AND PLAN:   24-year-old female with history of sleeve gastrectomy January 2019, depression, diarrhea predominant irritable bowel syndrome, GERD who presents for evaluation of low appetite and dysphagia  1  Gastroesophageal reflux disease with esophagitis  2  Dysphagia  She has history of LA grade C esophagitis on upper endoscopy in 2017  She subsequently underwent sleeve gastrectomy in 2019 with significant weight loss  Over the last several months she notes decreased appetite and early satiety, with intermittent solid food dysphagia  She states she has lost 7 lb unintentionally due to this  She continues to have good control of her reflux on omeprazole 40 mg daily  Differential diagnosis includes altered motility due to her sleeve gastrectomy,  , esophagitis, peptic stricture  I recommend repeat upper endoscopy to evaluate for mucosal abnormality, biopsy for H pylori and to evaluate the status of her sleeve gastrectomy  I recommend she continue to take omeprazole 40 mg daily in the meantime 30 minutes before breakfast   If her upper endoscopy is unremarkable she may benefit from gastric emptying study to evaluate for gastroparesis  I also recommend she eat smaller, more frequent meals not eat 2-3 hours before bed, sleep with the head of her bed elevated in avoid trigger foods     - EGD; Future    3  Chronic diarrhea  She has a history of irritable bowel syndrome, diarrhea predominant  Her colonoscopy in 2017 shows mild sigmoid colitis over biopsies were negative for inflammation    She continues to use Levsin as needed for abdominal spasm    Follow-up after EGD      ______________________________________________________________________    SUBJECTIVE:  24-year-old female with history of sleeve gastrectomy January 2019, depression, diarrhea predominant irritable bowel syndrome, GERD who presents for evaluation  Past history:  She was last evaluated in our office in 2017 for rectal bleeding and diarrhea  She underwent colonoscopy showing normal term and colon other than mild colitis in the sigmoid colon  Upper endoscopy showed LA class C esophagitis 2 cm hiatal hernia  Pathology showed chronic inactive gastritis negative for H pylori, nonspecific duodenitis with partial villous blunting  Colonic biopsies were unremarkable in all locations  Interval history:  She reports undergoing sleeve gastrectomy for weight loss in January 2019  She states she lost approximately 127 lb after this procedure  Over the last several month she has had significant low appetite and unintentional weight loss of approximately 7 lb  She denies nausea  She reports waking in the morning with a coughing/gagging sensation in her throat  She has intermittent vomiting of bile  She denies heartburn  She is currently taking omeprazole 40 mg daily with good control of her acid reflux  She denies sour taste in her mouth  She reports low appetite and after taking a few bites of food will feel full quickly  She reports chronic symptoms related to the IBS D which are controlled with Levsin as needed  With stress she has rectal urgency and diarrhea however in between these episodes are bowel movements looked normal   She denies melena, hematochezia  She does note dysphagia intermittently to solid foods or dry foods healing of the food gets stuck in her esophagus as it passes  She denies odynophagia  She denies NSAID use        REVIEW OF SYSTEMS IS OTHERWISE NEGATIVE        Historical Information   Past Medical History:   Diagnosis Date    Abdominal pain     Abnormal CAT scan     Anxiety     Anxiety     Blood in stool     Candidiasis of skin     Chronic diarrhea     Chronic fatigue     Colitis     Community acquired bacterial pneumonia     Depression     Dyspepsia     GERD (gastroesophageal reflux disease)     Hemorrhoids     Herpes labialis     IBS (irritable bowel syndrome)     Left ovarian cyst     Morbid obesity (HCC)     Nausea & vomiting     Sleep pattern disturbance     Strain of thoracic spine, initial encounter      Past Surgical History:   Procedure Laterality Date    COLONOSCOPY  12/05/2017    EGD AND COLONOSCOPY N/A 12/5/2017    Procedure: EGD AND COLONOSCOPY;  Surgeon: Phyllis Brock MD;  Location: [facility];   Service: Gastroenterology    GASTRECTOMY SLEEVE LAPAROSCOPIC  2019    UPPER GASTROINTESTINAL ENDOSCOPY  12/05/2017    WISDOM TOOTH EXTRACTION      WRIST SURGERY       Social History   Social History     Substance and Sexual Activity   Alcohol Use Yes    Comment: occasional     Social History     Substance and Sexual Activity   Drug Use No     Social History     Tobacco Use   Smoking Status Never Smoker   Smokeless Tobacco Never Used     Family History   Problem Relation Age of Onset    Melanoma Mother     No Known Problems Father     Other Maternal Grandmother         colitis    Diabetes Maternal Grandmother     Hypertension Maternal Grandmother     Stroke Paternal Grandfather     Heart disease Neg Hx     Thyroid disease Neg Hx        Meds/Allergies       Current Outpatient Medications:     cholecalciferol (VITAMIN D3) 1,000 units tablet    clindamycin-benzoyl peroxide (BENZACLIN) gel    cyclobenzaprine (FLEXERIL) 10 mg tablet    diazepam (VALIUM) 5 mg tablet    fexofenadine (ALLEGRA) 60 MG tablet    FLUoxetine (PROzac) 10 mg capsule    hyoscyamine (ANASPAZ,LEVSIN) 0 125 MG tablet    levonorgestrel (MIRENA) 20 MCG/24HR IUD    Multiple Vitamin (MULTIVITAMIN) tablet    nystatin (MYCOSTATIN) powder    omeprazole (PriLOSEC) 40 MG capsule    ondansetron (ZOFRAN-ODT) 4 mg disintegrating tablet    traZODone (DESYREL) 150 mg tablet    Allergies   Allergen Reactions    Other Other (See Comments)     Seasonal, dander, molds- sneezing, ear infections           Objective     Blood pressure 100/60, pulse 80, temperature 99 1 °F (37 3 °C), temperature source Tympanic, height 5' 2" (1 575 m), weight 53 1 kg (117 lb)  Body mass index is 21 4 kg/m²  PHYSICAL EXAM:      General Appearance:   Well-appearing young female Alert, cooperative, no distress   HEENT:   Normocephalic, atraumatic, anicteric  Neck:  Supple, symmetrical, trachea midline   Lungs:   Clear to auscultation bilaterally; no rales, rhonchi or wheezing; respirations unlabored    Heart[de-identified]   Regular rate and rhythm; no murmur, rub, or gallop  Abdomen:   Soft, non-tender, non-distended; normal bowel sounds; no masses, no organomegaly    Genitalia:   Deferred    Rectal:   Deferred    Extremities:  No cyanosis, clubbing or edema    Pulses:  2+ and symmetric    Skin:  No jaundice, rashes, or lesions    Lymph nodes:  No palpable cervical lymphadenopathy        Lab Results:   No visits with results within 1 Day(s) from this visit  Latest known visit with results is:   Orders Only on 09/17/2020   Component Date Value    HLA-B27 09/17/2020 NEGATIVE     C-Reactive Protein, Quant 09/17/2020 0 4          Radiology Results:   Xr Spine Thoracic 3 Vw    Result Date: 9/20/2020  Narrative: THORACIC SPINE INDICATION:   M54 6: Pain in thoracic spine G89 29: Other chronic pain  COMPARISON:  None VIEWS:  XR SPINE THORACIC 3 VW FINDINGS: There is no fracture or pathologic bone lesion  Thoracic vertebral alignment is within normal limits  No significant degenerative changes  There is no displacement of the paraspinal line  The pedicles appear intact  Impression: No acute osseous abnormality  Workstation performed: [id number]     Xr Spine Lumbar Minimum 4 Views Non Injury    Result Date: 9/18/2020  Narrative: LUMBAR SPINE INDICATION:   M54 6: Pain in thoracic spine G89 29: Other chronic pain   COMPARISON:  None VIEWS:  XR SPINE LUMBAR MINIMUM 4 VIEWS NON INJURY FINDINGS: There are 5 non rib bearing lumbar vertebral bodies  There is no evidence of acute fracture or destructive osseous lesion  Alignment is unremarkable  No significant lumbar degenerative change noted  The pedicles appear intact   Pelvic IUD     Impression: No acute osseous abnormalityV Workstation performed: [id number]

## 2020-09-28 NOTE — PATIENT INSTRUCTIONS
The patient is scheduled at [facility] for an EGD with Blake Holland on 10/20/2020  prep instructions have been gone over in the office, with the patient, by the MA  The patient is aware that they will receive a call with the arrival time the day prior to procedure and that they will need a  the day of the procedure   I have asked the patient to call with any questions that they might have prior to procedure

## 2020-10-19 ENCOUNTER — ANESTHESIA EVENT (OUTPATIENT)
Dept: GASTROENTEROLOGY | Facility: MEDICAL CENTER | Age: 30
End: 2020-10-19

## 2020-10-20 ENCOUNTER — HOSPITAL ENCOUNTER (OUTPATIENT)
Dept: GASTROENTEROLOGY | Facility: MEDICAL CENTER | Age: 30
Setting detail: OUTPATIENT SURGERY
Discharge: HOME | End: 2020-10-20
Admitting: INTERNAL MEDICINE
Payer: COMMERCIAL

## 2020-10-20 ENCOUNTER — ANESTHESIA (OUTPATIENT)
Dept: GASTROENTEROLOGY | Facility: MEDICAL CENTER | Age: 30
End: 2020-10-20

## 2020-10-20 VITALS
TEMPERATURE: 98.5 F | HEART RATE: 68 BPM | OXYGEN SATURATION: 99 % | DIASTOLIC BLOOD PRESSURE: 53 MMHG | HEIGHT: 62 IN | BODY MASS INDEX: 21.53 KG/M2 | RESPIRATION RATE: 18 BRPM | SYSTOLIC BLOOD PRESSURE: 95 MMHG | WEIGHT: 117 LBS

## 2020-10-20 VITALS — HEART RATE: 75 BPM

## 2020-10-20 DIAGNOSIS — R13.10 DYSPHAGIA, UNSPECIFIED TYPE: ICD-10-CM

## 2020-10-20 DIAGNOSIS — K21.00 GASTROESOPHAGEAL REFLUX DISEASE WITH ESOPHAGITIS: ICD-10-CM

## 2020-10-20 PROCEDURE — 88305 TISSUE EXAM BY PATHOLOGIST: CPT | Performed by: PATHOLOGY

## 2020-10-20 PROCEDURE — 43239 EGD BIOPSY SINGLE/MULTIPLE: CPT | Performed by: INTERNAL MEDICINE

## 2020-10-20 RX ORDER — SODIUM CHLORIDE 9 MG/ML
125 INJECTION, SOLUTION INTRAVENOUS CONTINUOUS
Status: DISCONTINUED | OUTPATIENT
Start: 2020-10-20 | End: 2020-10-24 | Stop reason: HOSPADM

## 2020-10-20 RX ORDER — PROPOFOL 10 MG/ML
INJECTION, EMULSION INTRAVENOUS AS NEEDED
Status: DISCONTINUED | OUTPATIENT
Start: 2020-10-20 | End: 2020-10-20

## 2020-10-20 RX ORDER — FENTANYL CITRATE/PF 50 MCG/ML
25 SYRINGE (ML) INJECTION
Status: DISCONTINUED | OUTPATIENT
Start: 2020-10-20 | End: 2020-10-24 | Stop reason: HOSPADM

## 2020-10-20 RX ORDER — ONDANSETRON 2 MG/ML
4 INJECTION INTRAMUSCULAR; INTRAVENOUS ONCE AS NEEDED
Status: DISCONTINUED | OUTPATIENT
Start: 2020-10-20 | End: 2020-10-24 | Stop reason: HOSPADM

## 2020-10-20 RX ADMIN — PROPOFOL 200 MG: 10 INJECTION, EMULSION INTRAVENOUS at 12:13

## 2020-10-20 RX ADMIN — PROPOFOL 100 MG: 10 INJECTION, EMULSION INTRAVENOUS at 12:15

## 2020-10-20 RX ADMIN — PROPOFOL 100 MG: 10 INJECTION, EMULSION INTRAVENOUS at 12:17

## 2020-10-20 RX ADMIN — SODIUM CHLORIDE 125 ML/HR: 0.9 INJECTION, SOLUTION INTRAVENOUS at 11:47

## 2020-10-22 ENCOUNTER — TELEPHONE (OUTPATIENT)
Dept: GASTROENTEROLOGY | Facility: MEDICAL CENTER | Age: 30
End: 2020-10-22

## 2020-11-02 ENCOUNTER — OFFICE VISIT (OUTPATIENT)
Dept: GASTROENTEROLOGY | Facility: MEDICAL CENTER | Age: 30
End: 2020-11-02
Payer: COMMERCIAL

## 2020-11-02 VITALS
HEIGHT: 62 IN | BODY MASS INDEX: 22.45 KG/M2 | TEMPERATURE: 98.5 F | DIASTOLIC BLOOD PRESSURE: 65 MMHG | SYSTOLIC BLOOD PRESSURE: 100 MMHG | HEART RATE: 88 BPM | WEIGHT: 122 LBS

## 2020-11-02 DIAGNOSIS — K44.9 HIATAL HERNIA: ICD-10-CM

## 2020-11-02 DIAGNOSIS — K52.9 CHRONIC DIARRHEA: ICD-10-CM

## 2020-11-02 DIAGNOSIS — K21.00 GASTROESOPHAGEAL REFLUX DISEASE WITH ESOPHAGITIS WITHOUT HEMORRHAGE: Primary | ICD-10-CM

## 2020-11-02 PROCEDURE — 99214 OFFICE O/P EST MOD 30 MIN: CPT | Performed by: PHYSICIAN ASSISTANT

## 2020-11-02 PROCEDURE — 3008F BODY MASS INDEX DOCD: CPT | Performed by: PSYCHIATRY & NEUROLOGY

## 2020-11-10 ENCOUNTER — TELEPHONE (OUTPATIENT)
Dept: FAMILY MEDICINE CLINIC | Facility: CLINIC | Age: 30
End: 2020-11-10

## 2020-11-10 ENCOUNTER — NURSE TRIAGE (OUTPATIENT)
Dept: OTHER | Facility: OTHER | Age: 30
End: 2020-11-10

## 2020-11-11 DIAGNOSIS — Z20.828 EXPOSURE TO SARS-ASSOCIATED CORONAVIRUS: ICD-10-CM

## 2020-11-11 PROCEDURE — U0003 INFECTIOUS AGENT DETECTION BY NUCLEIC ACID (DNA OR RNA); SEVERE ACUTE RESPIRATORY SYNDROME CORONAVIRUS 2 (SARS-COV-2) (CORONAVIRUS DISEASE [COVID-19]), AMPLIFIED PROBE TECHNIQUE, MAKING USE OF HIGH THROUGHPUT TECHNOLOGIES AS DESCRIBED BY CMS-2020-01-R: HCPCS | Performed by: INTERNAL MEDICINE

## 2020-11-13 ENCOUNTER — TELEPHONE (OUTPATIENT)
Dept: FAMILY MEDICINE CLINIC | Facility: CLINIC | Age: 30
End: 2020-11-13

## 2020-11-13 LAB — SARS-COV-2 RNA SPEC QL NAA+PROBE: NOT DETECTED

## 2020-11-17 ENCOUNTER — DOCUMENTATION (OUTPATIENT)
Dept: PSYCHIATRY | Facility: CLINIC | Age: 30
End: 2020-11-17

## 2020-11-17 ENCOUNTER — TELEPHONE (OUTPATIENT)
Dept: GASTROENTEROLOGY | Facility: MEDICAL CENTER | Age: 30
End: 2020-11-17

## 2020-11-17 DIAGNOSIS — F33.2 SEVERE EPISODE OF RECURRENT MAJOR DEPRESSIVE DISORDER, WITHOUT PSYCHOTIC FEATURES (HCC): ICD-10-CM

## 2020-11-17 DIAGNOSIS — F33.8 SEASONAL AFFECTIVE DISORDER (HCC): ICD-10-CM

## 2020-11-17 DIAGNOSIS — F41.8 OTHER SPECIFIED ANXIETY DISORDERS: ICD-10-CM

## 2020-11-17 RX ORDER — FLUOXETINE HYDROCHLORIDE 20 MG/1
20 CAPSULE ORAL DAILY
Qty: 90 CAPSULE | Refills: 1 | Status: SHIPPED | OUTPATIENT
Start: 2020-11-17 | End: 2020-11-18 | Stop reason: SDUPTHER

## 2020-11-18 ENCOUNTER — TELEMEDICINE (OUTPATIENT)
Dept: PSYCHIATRY | Facility: CLINIC | Age: 30
End: 2020-11-18
Payer: COMMERCIAL

## 2020-11-18 ENCOUNTER — VBI (OUTPATIENT)
Dept: ADMINISTRATIVE | Facility: OTHER | Age: 30
End: 2020-11-18

## 2020-11-18 DIAGNOSIS — F90.9 ATTENTION DEFICIT HYPERACTIVITY DISORDER (ADHD), UNSPECIFIED ADHD TYPE: ICD-10-CM

## 2020-11-18 DIAGNOSIS — F33.2 SEVERE EPISODE OF RECURRENT MAJOR DEPRESSIVE DISORDER, WITHOUT PSYCHOTIC FEATURES (HCC): ICD-10-CM

## 2020-11-18 DIAGNOSIS — F33.8 SEASONAL AFFECTIVE DISORDER (HCC): Primary | ICD-10-CM

## 2020-11-18 DIAGNOSIS — G47.00 INSOMNIA, UNSPECIFIED TYPE: ICD-10-CM

## 2020-11-18 DIAGNOSIS — F41.8 OTHER SPECIFIED ANXIETY DISORDERS: ICD-10-CM

## 2020-11-18 PROCEDURE — 99214 OFFICE O/P EST MOD 30 MIN: CPT | Performed by: PSYCHIATRY & NEUROLOGY

## 2020-11-18 PROCEDURE — 1036F TOBACCO NON-USER: CPT | Performed by: PSYCHIATRY & NEUROLOGY

## 2020-11-18 PROCEDURE — 90833 PSYTX W PT W E/M 30 MIN: CPT | Performed by: PSYCHIATRY & NEUROLOGY

## 2020-11-18 RX ORDER — FLUOXETINE HYDROCHLORIDE 20 MG/1
20 CAPSULE ORAL DAILY
Qty: 90 CAPSULE | Refills: 1 | Status: SHIPPED | OUTPATIENT
Start: 2020-11-18 | End: 2021-02-24

## 2020-12-01 ENCOUNTER — HOSPITAL ENCOUNTER (OUTPATIENT)
Dept: GASTROENTEROLOGY | Facility: HOSPITAL | Age: 30
Discharge: HOME | End: 2020-12-01
Payer: COMMERCIAL

## 2020-12-01 VITALS
HEART RATE: 58 BPM | OXYGEN SATURATION: 97 % | DIASTOLIC BLOOD PRESSURE: 54 MMHG | SYSTOLIC BLOOD PRESSURE: 91 MMHG | RESPIRATION RATE: 16 BRPM | TEMPERATURE: 98.3 F

## 2020-12-01 DIAGNOSIS — K44.9 HIATAL HERNIA: ICD-10-CM

## 2020-12-01 DIAGNOSIS — K21.00 GASTROESOPHAGEAL REFLUX DISEASE WITH ESOPHAGITIS WITHOUT HEMORRHAGE: ICD-10-CM

## 2020-12-01 DIAGNOSIS — K21.9 GERD WITH APNEA: ICD-10-CM

## 2020-12-01 DIAGNOSIS — R06.81 GERD WITH APNEA: ICD-10-CM

## 2020-12-01 PROCEDURE — 91020 GASTRIC MOTILITY STUDIES: CPT

## 2020-12-01 PROCEDURE — 91038 ESOPH IMPED FUNCT TEST > 1HR: CPT

## 2020-12-01 RX ORDER — OMEPRAZOLE 40 MG/1
CAPSULE, DELAYED RELEASE ORAL
Qty: 90 CAPSULE | Refills: 0 | Status: SHIPPED | OUTPATIENT
Start: 2020-12-01 | End: 2021-03-08

## 2020-12-13 DIAGNOSIS — F41.8 OTHER SPECIFIED ANXIETY DISORDERS: ICD-10-CM

## 2020-12-15 RX ORDER — DIAZEPAM 5 MG/1
TABLET ORAL
Qty: 30 TABLET | Refills: 1 | Status: SHIPPED | OUTPATIENT
Start: 2020-12-15 | End: 2022-01-04 | Stop reason: SDUPTHER

## 2020-12-16 ENCOUNTER — TELEMEDICINE (OUTPATIENT)
Dept: BARIATRICS | Facility: CLINIC | Age: 30
End: 2020-12-16

## 2020-12-16 VITALS — BODY MASS INDEX: 22.45 KG/M2 | HEIGHT: 62 IN | WEIGHT: 122 LBS

## 2020-12-16 DIAGNOSIS — K44.9 HIATAL HERNIA: ICD-10-CM

## 2020-12-16 DIAGNOSIS — R13.10 DYSPHAGIA, UNSPECIFIED TYPE: Primary | ICD-10-CM

## 2020-12-16 PROCEDURE — 1036F TOBACCO NON-USER: CPT | Performed by: SURGERY

## 2020-12-16 PROCEDURE — 99204 OFFICE O/P NEW MOD 45 MIN: CPT | Performed by: SURGERY

## 2020-12-16 PROCEDURE — 3008F BODY MASS INDEX DOCD: CPT | Performed by: SURGERY

## 2020-12-17 PROCEDURE — 91038 ESOPH IMPED FUNCT TEST > 1HR: CPT | Performed by: INTERNAL MEDICINE

## 2020-12-17 PROCEDURE — 91010 ESOPHAGUS MOTILITY STUDY: CPT | Performed by: INTERNAL MEDICINE

## 2020-12-18 ENCOUNTER — TELEPHONE (OUTPATIENT)
Dept: BARIATRICS | Facility: CLINIC | Age: 30
End: 2020-12-18

## 2020-12-18 NOTE — TELEPHONE ENCOUNTER
I spoke with patient and inform her that DR Coco Perez states she pt to get UGI done and schedule a f/u after to review and pt will also need RD appt

## 2020-12-31 ENCOUNTER — TRANSCRIBE ORDERS (OUTPATIENT)
Dept: GASTROENTEROLOGY | Facility: CLINIC | Age: 30
End: 2020-12-31

## 2021-01-17 NOTE — PROGRESS NOTES
Assessment and Plan:   Patient is a 49-year-old female who presents for rheumatology consult regarding chronic back pain  She describes issues with back pain diffusely through the spine involving the lower back and across the upper back and into her over the past 5 years  She does not describe typical inflammatory features for her back pain such as prolonged morning stiffness that gets better with activity and movement  She did have testing already for HLA B27 which was negative which decreases suspicion for an underlying inflammatory spondylitis  She also had x-ray of the thoracic and lumbar spine which were grossly normal   We discussed we will do some additional rheumatologic workup however suspicion is low for an underlying rheumatic cause for her back pain  I did refer her to spine Pain Management for further evaluation of this and possibly she would from trigger point injections in the as she did have reproducible tenderness in the musculature  She did not have a typical exam for generalized fibromyalgia pain as it was pretty localized to her back  If her workup is negative she will not need additional rheumatologic follow-up  Plan:  Diagnoses and all orders for this visit:    Polyarthralgia  -     Vitamin D 25 hydroxy  -     MIGUEL ÁNGEL Screen w/ Reflex to Titer/Pattern  -     Sedimentation rate, automated  -     Sjogren's Antibodies  -     RF Screen w/ Reflex to Titer  -     Cyclic citrul peptide antibody, IgG    Arthralgia, unspecified joint  -     Ambulatory referral to Rheumatology    Colitis  -     Ambulatory referral to Rheumatology    Myalgia  -     Ambulatory referral to Pain Management; Future    Chronic bilateral low back pain without sciatica  -     XR sacroiliac joints 3+ views; Future  -     Ambulatory referral to Pain Management;  Future    Other orders  -     Cancel: Chronic Hepatitis Panel        Follow-up plan: no rheumatology follow-up needed if work-up negative       SLIM Oliver is a 27 y o   female s/p sleeve gastrectomy 6/2019, GERD, depression, IBS, who presents for rheumatology consult by request of Dr Juan Pablo Sullivan for chronic back pain  She reports chronic lower and mid back pain ongoing for about 5 years  Feels it is possibly muscular pain along the spine but is unsure  If she stands longer than an hour her back will hurt  Does have morning stiffness that does not necessarily improve with activity or movement  Has pain in her shoulders, across her upper back into the neck  Sometimes has joint pain in her thumbs and attributes this to working on a computer all day  Otherwise no significant persistent peripheral joint pain  No joint swelling anywhere or morning stiffness in the joints  She is using Flexeril which does help her at night  She cannot take NSAIDs due to her history of gastric sleeve  Sometimes Tylenol does help  No known history of inflammatory bowel or eye disease  Denies photosensitivity, rashes, psoriasis, sicca symptoms, oral or nasal ulcers, alopecia, Raynaud's, h/o pericarditis or pleurisy, h/o blood clots or miscarriages  Review of Systems  Review of Systems   Constitutional: Negative for chills, fatigue, fever and unexpected weight change  HENT: Negative for mouth sores and trouble swallowing  Eyes: Negative for pain and visual disturbance  Respiratory: Negative for cough and shortness of breath  Cardiovascular: Negative for chest pain and leg swelling  Gastrointestinal: Negative for abdominal pain, blood in stool, constipation, diarrhea and nausea  Musculoskeletal: Positive for back pain and myalgias  Negative for arthralgias and joint swelling  Skin: Negative for color change and rash  Neurological: Negative for weakness and numbness  Hematological: Negative for adenopathy  Psychiatric/Behavioral: Negative for sleep disturbance         Allergies  Allergies   Allergen Reactions    Other Other (See Comments)     Seasonal, dander, molds- sneezing, ear infections       Home Medications    Current Outpatient Medications:     cholecalciferol (VITAMIN D3) 1,000 units tablet, Take 1 capsule by mouth, Disp: , Rfl:     clindamycin-benzoyl peroxide (BENZACLIN) gel, Apply topically 2 (two) times a day, Disp: 25 g, Rfl: 0    cyclobenzaprine (FLEXERIL) 10 mg tablet, Take 1 tablet (10 mg total) by mouth 3 (three) times a day as needed for muscle spasms, Disp: 15 tablet, Rfl: 0    diazepam (VALIUM) 5 mg tablet, TAKE 1 TABLET BY MOUTH EVERY 8 HOURS AS NEEDED FOR ANXIETY, Disp: 30 tablet, Rfl: 1    fexofenadine (ALLEGRA) 60 MG tablet, one by mouth daily prn, Disp: , Rfl:     FLUoxetine (PROzac) 20 mg capsule, Take 1 capsule (20 mg total) by mouth daily, Disp: 90 capsule, Rfl: 1    hyoscyamine (ANASPAZ,LEVSIN) 0 125 MG tablet, Take 1 tablet (0 125 mg total) by mouth every 4 (four) hours as needed for cramping, Disp: 30 tablet, Rfl: 0    levonorgestrel (MIRENA) 20 MCG/24HR IUD, 1 each by Intrauterine route, Disp: , Rfl:     Multiple Vitamin (MULTIVITAMIN) tablet, Take 1 tablet by mouth daily, Disp: , Rfl:     omeprazole (PriLOSEC) 40 MG capsule, take 1 capsule by mouth once daily, Disp: 90 capsule, Rfl: 0    ondansetron (ZOFRAN-ODT) 4 mg disintegrating tablet, Take 1 tablet (4 mg total) by mouth every 6 (six) hours as needed for nausea, Disp: 20 tablet, Rfl: 0    traZODone (DESYREL) 150 mg tablet, Take 1 5 tablets (225 mg total) by mouth daily at bedtime as needed for sleep (Patient taking differently: Take 150 mg by mouth daily at bedtime as needed for sleep ), Disp: 135 tablet, Rfl: 1    Past Medical History  Past Medical History:   Diagnosis Date    Abdominal pain     Abnormal CAT scan     Anxiety     Anxiety     Blood in stool     Candidiasis of skin     Chronic diarrhea     Chronic fatigue     Colitis     Community acquired bacterial pneumonia     Depression     Dyspepsia     GERD (gastroesophageal reflux disease)     Hemorrhoids     Herpes labialis     IBS (irritable bowel syndrome)     Left ovarian cyst     Morbid obesity (HCC)     Nausea & vomiting     Sleep pattern disturbance     Strain of thoracic spine, initial encounter        Past Surgical History   Past Surgical History:   Procedure Laterality Date    COLONOSCOPY  12/05/2017    EGD AND COLONOSCOPY N/A 12/5/2017    Procedure: EGD AND COLONOSCOPY;  Surgeon: Veena Ta MD;  Location:  GI LAB; Service: Gastroenterology    GASTRECTOMY SLEEVE LAPAROSCOPIC  2019    UPPER GASTROINTESTINAL ENDOSCOPY  12/05/2017    WISDOM TOOTH EXTRACTION      WRIST SURGERY         Family History  No known family history of autoimmune or inflammatory diseases  Family History   Problem Relation Age of Onset   [facility] Melanoma Mother     No Known Problems Father     Other Maternal Grandmother         colitis    Diabetes Maternal Grandmother     Hypertension Maternal Grandmother     Stroke Paternal Grandfather     Heart disease Neg Hx     Thyroid disease Neg Hx        Social History  Occupation: works from home, on a computer   Social History     Substance and Sexual Activity   Alcohol Use Yes    Frequency: Monthly or less    Drinks per session: 1 or 2    Binge frequency: Never    Comment: occasional     Social History     Substance and Sexual Activity   Drug Use No     Social History     Tobacco Use   Smoking Status Current Every Day Smoker    Types: E-Cigarettes   Smokeless Tobacco Never Used       Objective:    Vitals:    01/19/21 0811   BP: 110/62   Pulse: 84   Weight: 57 6 kg (127 lb)   Height: 5' 2" (1 575 m)       Physical Exam  Constitutional:       General: She is not in acute distress  Appearance: She is well-developed  HENT:      Head: Normocephalic and atraumatic  Eyes:      General: Lids are normal  No scleral icterus  Conjunctiva/sclera: Conjunctivae normal    Neck:      Musculoskeletal: Neck supple  No muscular tenderness        Thyroid: No thyromegaly  Cardiovascular:      Rate and Rhythm: Normal rate and regular rhythm  Heart sounds: S1 normal and S2 normal  No murmur  No friction rub  Pulmonary:      Effort: Pulmonary effort is normal  No tachypnea or respiratory distress  Breath sounds: Normal breath sounds  No wheezing, rhonchi or rales  Musculoskeletal:      Comments: Reproducible soft tissue tenderness throughout the paraspinal musculature, across the upper back shoulders, and in the anterior chest   No significant reproducible tenderness throughout the extremities  No joint pain reproduced on exam   No joint swelling or synovitis anywhere  Lymphadenopathy:      Head:      Right side of head: No submental or submandibular adenopathy  Left side of head: No submental or submandibular adenopathy  Cervical: No cervical adenopathy  Skin:     General: Skin is warm and dry  Findings: No rash  Nails: There is no clubbing  Neurological:      Mental Status: She is alert  Sensory: No sensory deficit  Psychiatric:         Behavior: Behavior normal  Behavior is cooperative  Imaging:   XR thoracic/lumbar 9/2020:  Normal, no inflammatory changes     Labs:   Component      Latest Ref Rng & Units 8/19/2020   White Blood Cell Count      3 8 - 10 8 Thousand/uL 5 7   Red Blood Cell Count      3 80 - 5 10 Million/uL 4 87   Hemoglobin      11 7 - 15 5 g/dL 14 7   HCT      35 0 - 45 0 % 44 3   MCV      80 0 - 100 0 fL 91 0   MCH      27 0 - 33 0 pg 30 2   MCHC        32 0 - 36 0 g/dL 33 2   RDW      11 0 - 15 0 % 12 4   Platelet Count      307 - 400 Thousand/uL 290   SL AMB MPV      7 5 - 12 5 fL 11 0   Neutrophils (Absolute)      1,500 - 7,800 cells/uL 3,671   Lymphocytes (Absolute)      850 - 3,900 cells/uL 1,659   Monocytes (Absolute)      200 - 950 cells/uL 319   Eosinophils (Absolute)      15 - 500 cells/uL 23   Basophils ABS      0 - 200 cells/uL 29   Neutrophils      % 64 4   Lymphocytes      % 29 1 Monocytes      % 5 6   Eosinophils      % 0 4   Basophils PCT      % 0 5   Glucose, Random      65 - 99 mg/dL 86   BUN      7 - 25 mg/dL 13   Creatinine      0 50 - 1 10 mg/dL 0 89   eGFR Non       > OR = 60 mL/min/1 73m2 88   eGFR       > OR = 60 mL/min/1 73m2 102   SL AMB BUN/CREATININE RATIO      6 - 22 (calc) NOT APPLICABLE   Sodium      135 - 146 mmol/L 139   Potassium      3 5 - 5 3 mmol/L 3 8   Chloride      98 - 110 mmol/L 104   CO2      20 - 32 mmol/L 27   Calcium      8 6 - 10 2 mg/dL 9 6   Total Protein      6 1 - 8 1 g/dL 6 9   Albumin      3 6 - 5 1 g/dL 4 5   Globulin      1 9 - 3 7 g/dL (calc) 2 4   Albumin/Globulin Ratio      1 0 - 2 5 (calc) 1 9   TOTAL BILIRUBIN      0 2 - 1 2 mg/dL 1 4 (H)   Alkaline Phosphatase      31 - 125 U/L 56   AST      10 - 30 U/L 12   ALT      6 - 29 U/L 7   TSH W/RFX TO FREE T4      mIU/L 0 76     Component      Latest Ref Rng & Units 9/17/2020   HLA-B27      NEGATIVE NEGATIVE   C-Reactive Protein, Quant      <8 0 mg/L 0 4

## 2021-01-18 ENCOUNTER — OFFICE VISIT (OUTPATIENT)
Dept: FAMILY MEDICINE CLINIC | Facility: CLINIC | Age: 31
End: 2021-01-18
Payer: COMMERCIAL

## 2021-01-18 VITALS
HEART RATE: 98 BPM | BODY MASS INDEX: 23.48 KG/M2 | HEIGHT: 62 IN | OXYGEN SATURATION: 99 % | DIASTOLIC BLOOD PRESSURE: 64 MMHG | RESPIRATION RATE: 16 BRPM | WEIGHT: 127.6 LBS | SYSTOLIC BLOOD PRESSURE: 100 MMHG | TEMPERATURE: 98.4 F

## 2021-01-18 DIAGNOSIS — Z13.89 SCREENING FOR GENITOURINARY CONDITION: ICD-10-CM

## 2021-01-18 DIAGNOSIS — R13.12 OROPHARYNGEAL DYSPHAGIA: Primary | ICD-10-CM

## 2021-01-18 DIAGNOSIS — Z13.21 ENCOUNTER FOR VITAMIN DEFICIENCY SCREENING: ICD-10-CM

## 2021-01-18 DIAGNOSIS — Z98.84 BARIATRIC SURGERY STATUS: ICD-10-CM

## 2021-01-18 DIAGNOSIS — Z13.220 NEED FOR LIPID SCREENING: ICD-10-CM

## 2021-01-18 DIAGNOSIS — Z00.00 ANNUAL PHYSICAL EXAM: ICD-10-CM

## 2021-01-18 DIAGNOSIS — Z13.1 SCREENING FOR DIABETES MELLITUS (DM): ICD-10-CM

## 2021-01-18 DIAGNOSIS — Z13.29 SCREENING FOR THYROID DISORDER: ICD-10-CM

## 2021-01-18 PROCEDURE — 3725F SCREEN DEPRESSION PERFORMED: CPT | Performed by: INTERNAL MEDICINE

## 2021-01-18 PROCEDURE — 99395 PREV VISIT EST AGE 18-39: CPT | Performed by: INTERNAL MEDICINE

## 2021-01-18 NOTE — PROGRESS NOTES
Assessment/Plan:         Diagnoses and all orders for this visit:      Annual physical exam  As above  Bariatric surgery status      Oropharyngeal dysphagia      Subjective:      Patient ID: Dylan Gloria is a 27 y o  female  HPI  Patient history of dysphagia depression status post bariatric surgery is here to follow-up on chronic medical problems  Patient has ongoing dyspeptic symptoms which she recently had an EGD  Recent bariatric surgery appointment was reviewed  Patient is on a PPI  Patient has ongoing bowel changes for which I encouraged her to follow-up with GI  Depression is controlled on current regimen  However, she is under lot of stress  She is following up regularly with a therapist and a psychiatrist   Ongoing back pain for which she will be seeing rheumatologist soon  The following portions of the patient's history were reviewed and updated as appropriate: allergies, current medications, past family history, past medical history, past social history, past surgical history and problem list     Review of Systems   Constitutional: Negative for chills and fever  HENT: Positive for trouble swallowing  Eyes: Negative for visual disturbance  Respiratory: Negative for cough and shortness of breath  Cardiovascular: Negative for chest pain, palpitations and leg swelling  Gastrointestinal: Positive for abdominal pain and nausea  Negative for blood in stool and constipation  Genitourinary: Negative for difficulty urinating  Musculoskeletal: Positive for back pain (Patient has an appointment to see rheumatology)  Neurological: Negative for dizziness  Hematological: Negative for adenopathy  Does not bruise/bleed easily  Psychiatric/Behavioral: Negative for dysphoric mood (Controlled)  The patient is not nervous/anxious           Increased stress lately         Objective:      /64 (BP Location: Left arm, Patient Position: Sitting, Cuff Size: Large)   Pulse 98   Temp 98 4 °F (36 9 °C)   Resp 16   Ht 5' 2" (1 575 m)   Wt 57 9 kg (127 lb 9 6 oz)   SpO2 99%   BMI 23 34 kg/m²          Physical Exam  Constitutional:       General: She is not in acute distress  Appearance: She is well-developed  HENT:      Head: Normocephalic  Mouth/Throat:      Pharynx: No oropharyngeal exudate  Eyes:      General: No scleral icterus  Pupils: Pupils are equal, round, and reactive to light  Neck:      Thyroid: No thyromegaly  Cardiovascular:      Rate and Rhythm: Normal rate and regular rhythm  Heart sounds: Normal heart sounds  No murmur  Pulmonary:      Effort: Pulmonary effort is normal  No respiratory distress  Breath sounds: Normal breath sounds  No wheezing or rales  Abdominal:      General: Bowel sounds are normal  There is no distension  Palpations: Abdomen is soft  Tenderness: There is no abdominal tenderness  There is no guarding or rebound  Lymphadenopathy:      Cervical: No cervical adenopathy  Skin:     General: Skin is warm  Neurological:      Mental Status: She is alert and oriented to person, place, and time  Cranial Nerves: No cranial nerve deficit  Deep Tendon Reflexes: Reflexes are normal and symmetric  Psychiatric:         Mood and Affect: Mood normal          Behavior: Behavior normal          Thought Content:  Thought content normal          Judgment: Judgment normal

## 2021-01-19 ENCOUNTER — VBI (OUTPATIENT)
Dept: ADMINISTRATIVE | Facility: OTHER | Age: 31
End: 2021-01-19

## 2021-01-19 ENCOUNTER — CONSULT (OUTPATIENT)
Dept: RHEUMATOLOGY | Facility: CLINIC | Age: 31
End: 2021-01-19
Payer: COMMERCIAL

## 2021-01-19 VITALS
BODY MASS INDEX: 23.37 KG/M2 | WEIGHT: 127 LBS | HEART RATE: 84 BPM | HEIGHT: 62 IN | DIASTOLIC BLOOD PRESSURE: 62 MMHG | SYSTOLIC BLOOD PRESSURE: 110 MMHG

## 2021-01-19 DIAGNOSIS — M54.50 CHRONIC BILATERAL LOW BACK PAIN WITHOUT SCIATICA: ICD-10-CM

## 2021-01-19 DIAGNOSIS — K52.9 COLITIS: ICD-10-CM

## 2021-01-19 DIAGNOSIS — M25.50 ARTHRALGIA, UNSPECIFIED JOINT: ICD-10-CM

## 2021-01-19 DIAGNOSIS — G89.29 CHRONIC BILATERAL LOW BACK PAIN WITHOUT SCIATICA: ICD-10-CM

## 2021-01-19 DIAGNOSIS — M79.10 MYALGIA: ICD-10-CM

## 2021-01-19 DIAGNOSIS — M25.50 POLYARTHRALGIA: Primary | ICD-10-CM

## 2021-01-19 PROCEDURE — 3008F BODY MASS INDEX DOCD: CPT | Performed by: INTERNAL MEDICINE

## 2021-01-19 PROCEDURE — 99245 OFF/OP CONSLTJ NEW/EST HI 55: CPT | Performed by: INTERNAL MEDICINE

## 2021-02-14 DIAGNOSIS — G47.00 INSOMNIA, UNSPECIFIED TYPE: ICD-10-CM

## 2021-02-15 RX ORDER — TRAZODONE HYDROCHLORIDE 150 MG/1
150 TABLET ORAL
Qty: 30 TABLET | Refills: 1 | Status: SHIPPED | OUTPATIENT
Start: 2021-02-15 | End: 2021-04-19

## 2021-02-24 ENCOUNTER — TELEMEDICINE (OUTPATIENT)
Dept: PSYCHIATRY | Facility: CLINIC | Age: 31
End: 2021-02-24
Payer: COMMERCIAL

## 2021-02-24 DIAGNOSIS — F90.9 ATTENTION DEFICIT HYPERACTIVITY DISORDER (ADHD), UNSPECIFIED ADHD TYPE: ICD-10-CM

## 2021-02-24 DIAGNOSIS — F33.2 SEVERE EPISODE OF RECURRENT MAJOR DEPRESSIVE DISORDER, WITHOUT PSYCHOTIC FEATURES (HCC): Primary | ICD-10-CM

## 2021-02-24 DIAGNOSIS — F33.8 SEASONAL AFFECTIVE DISORDER (HCC): ICD-10-CM

## 2021-02-24 DIAGNOSIS — G47.00 INSOMNIA, UNSPECIFIED TYPE: ICD-10-CM

## 2021-02-24 PROCEDURE — 99214 OFFICE O/P EST MOD 30 MIN: CPT | Performed by: PSYCHIATRY & NEUROLOGY

## 2021-02-24 PROCEDURE — 90833 PSYTX W PT W E/M 30 MIN: CPT | Performed by: PSYCHIATRY & NEUROLOGY

## 2021-02-24 RX ORDER — FLUOXETINE 20 MG/1
30 TABLET, FILM COATED ORAL DAILY
Qty: 45 TABLET | Refills: 1 | Status: SHIPPED | OUTPATIENT
Start: 2021-02-24 | End: 2021-04-19

## 2021-02-24 NOTE — PSYCH
Virtual Regular Visit      Assessment/Plan:    Problem List Items Addressed This Visit        Other    Severe recurrent major depression ([address] ) - Primary    Relevant Medications    FLUoxetine (PROzac) 20 MG tablet    Insomnia    ADHD    Relevant Medications    FLUoxetine (PROzac) 20 MG tablet    Seasonal affective disorder (HCC)    Relevant Medications    FLUoxetine (PROzac) 20 MG tablet               Reason for visit is   Chief Complaint   Patient presents with    Medication Management    Virtual Regular Visit        Encounter provider Yana Lugo MD    Provider located at [address] [place] [id number] [phone]      Recent Visits  No visits were found meeting these conditions  Showing recent visits within past 7 days and meeting all other requirements     Today's Visits  Date Type Provider Dept   02/24/21 Telemedicine Yana Lugo MD [facility] 18 today's visits and meeting all other requirements     Future Appointments  No visits were found meeting these conditions  Showing future appointments within next 150 days and meeting all other requirements        The patient was identified by name and date of birth  Darnell Rudolph was informed that this is a telemedicine visit and that the visit is being conducted through [facility] and patient was informed that this is a secure, HIPAA-compliant platform  She agrees to proceed     My office door was closed  No one else was in the room  She acknowledged consent and understanding of privacy and security of the video platform  The patient has agreed to participate and understands they can discontinue the visit at any time  Patient is aware this is a billable service  Subjective:     Patient ID: Darnell Rudolph is a 27 y o  female MDD, insomnia, and ADHD presenting for a follow up visit   She is currently on Prozac, trazodone, and s/p wellbutrin due to worsening anxiety  HPI ROS Appetite Changes and Sleep: The patient stated that she as been doing okay, she has been better  She feels that because she is not getting out and having social interactions that it is affecting her memory and focus  She has been sleeping with Trazodone at 75mg  She still has trouble getting up in the morning, but the sun coming up helps her  Her energy level is not great  She is not keeping up with her house cleaning chores  She works out once every few weeks  She thinks that the Prozac is helping and not causing her current symptoms  She feels that the Prozac help her handle situations easier  Her anxiety is better  She denied SI/HI  She is doing well in her relationship  He is very positive and supportive  They are practically living together and cohabitate well  She has been very busy at work, which reduced her alone time and time for herself  Appetite is reduced and she is maintaining her weight, but she is not eating as healthy as she should be  Review Of Systems:     Mood Anxiety and Depression depression >anxiety   Behavior Normal    Thought Content Normal   General Emotional Problems and Sleep Disturbances   Personality Normal   Other Psych Symptoms Normal   Constitutional As Noted in HPI   ENT Negative   Cardiovascular Negative   Respiratory Negative   Gastrointestinal Negative   Genitourinary Negative   Musculoskeletal Negative   Integumentary Negative   Neurological Negative   Endocrine Normal    Other Symptoms Normal              Laboratory Results: No results found for this or any previous visit      Substance Abuse History:  Social History     Substance and Sexual Activity   Drug Use No       Family Psychiatric History:   Family History   Problem Relation Age of Onset   [facility] [facility] Melanoma Mother     No Known Problems Father     Other Maternal Grandmother         colitis    Diabetes Maternal Grandmother     Hypertension Maternal Grandmother     Stroke Paternal Grandfather     Heart disease Neg Hx     Thyroid disease Neg Hx        The following portions of the patient's history were reviewed and updated as appropriate: allergies, current medications, past family history, past medical history, past social history, past surgical history and problem list     Social History     Socioeconomic History    Marital status: Single     Spouse name: Not on file    Number of children: 0    Years of education: BA in social work    Highest education level: Not on file   Occupational History    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   Social Needs    Financial resource strain: Not on file    Food insecurity     Worry: Not on file     Inability: Not on file   [employer] needs     Medical: Not on file     Non-medical: Not on file   Tobacco Use    Smoking status: Current Every Day Smoker     Types: E-Cigarettes    Smokeless tobacco: Never Used   Substance and Sexual Activity    Alcohol use: Yes     Frequency: Monthly or less     Drinks per session: 1 or 2     Binge frequency: Never     Comment: occasional    Drug use: No    Sexual activity: Not Currently     Birth control/protection: I U D     Lifestyle    Physical activity     Days per week: Not on file     Minutes per session: Not on file    Stress: Not on file   Relationships    Social connections     Talks on phone: Not on file     Gets together: Not on file     Attends Judaism service: Not on file     Active member of club or organization: Not on file     Attends meetings of clubs or organizations: Not on file     Relationship status: Not on file    Intimate partner violence     Fear of current or ex partner: Not on file     Emotionally abused: Not on file     Physically abused: Not on file     Forced sexual activity: Not on file   Other Topics Concern    Not on file   Social History Narrative    Not on file     Social History     Social History Narrative    Not on file       Objective:       Mental status:  Appearance calm and cooperative , adequate hygiene and grooming and good eye contact    Mood euthymic   Affect affect was constricted   Speech a normal rate   Thought Processes coherent/organized and normal thought processes   Hallucinations no hallucinations present    Thought Content no delusions   Abnormal Thoughts no suicidal thoughts  and no homicidal thoughts    Orientation  oriented to person and place and time   Remote Memory short term memory intact and long term memory intact   Attention Span concentration intact   Intellect Appears to be of Average Intelligence   Insight Insight intact   Judgement judgment was intact   Muscle Strength Muscle strength and tone were normal and Normal gait    Language no difficulty naming common objects, no difficulty repeating a phrase  and no difficulty writing a sentence    Fund of Knowledge displays adequate knowledge of current events, adequate fund of knowledge regarding past history and adequate fund of knowledge regarding vocabulary    Pain none   Pain Scale 0       Assessment/Plan:          Encounter Diagnoses   Name Primary?  Insomnia, unspecified type     Seasonal affective disorder (HCC)     Attention deficit hyperactivity disorder (ADHD), unspecified ADHD type     Severe episode of recurrent major depressive disorder, without psychotic features ([facility] [address] ) Yes     Continue trazodone 75mg  Increase prozac to 30mg and can increase further to 40mg if needed in 1-2 weeks      Follow up in 3 months    Treatment Recommendations- Risks Benefits      Immediate Medical/Psychiatric/Psychotherapy Treatments and Any Precautions: depression better, but still there  Anxiety is better  She is feeling stressed at work and at home due to having to work late, [employer], and then not have much time for herself or self care  Will increase Prozac      Risks, Benefits And Possible Side Effects Of Medications:  PSYCH RISK, BENEFITS AND POSSIBLE SIDE EFFECTS disucssed    Controlled Medication Discussion: Discussed with patient Black Box warning on concurrent use of benzodiazepines and opioid medications including sedation, respiratory depression, coma and death  Patient understands the risk of treatment with benzodiazepines in addition to opioids and wants to continue taking those medications  , Discussed with patient the risks of sedation, respiratory depression, impairment of ability to drive and potential for abuse and addiction related to treatment with benzodiazepine medications  The patient understands risk of treatment with benzodiazepine medications, agrees to not drive if feels impaired and agrees to take medications as prescribed  and The patient has been filling controlled prescriptions on time as prescribed to [facility] [facility] program       Psychotherapy Provided: Individual psychotherapy provided  Goals discussed in session: medications, stress at work, a more even relationship with a division of labor  Counseling provided: 16    I spent 25 minutes with patient today in which greater than 50% of the time was spent in counseling/coordination of care regarding treatment    This note was not shared with the patient due to reasonable likelihood of causing patient harm    VIRTUAL VISIT DISCLAIMER    Tomi Cruz acknowledges that she has consented to an online visit or consultation  She understands that the online visit is based solely on information provided by her, and that, in the absence of a face-to-face physical evaluation by the physician, the diagnosis she receives is both limited and provisional in terms of accuracy and completeness  This is not intended to replace a full medical face-to-face evaluation by the physician  Tomi Cruz understands and accepts these terms

## 2021-02-24 NOTE — PATIENT INSTRUCTIONS
Encounter Diagnoses   Name Primary?     Insomnia, unspecified type     Seasonal affective disorder (HCC)     Attention deficit hyperactivity disorder (ADHD), unspecified ADHD type     Severe episode of recurrent major depressive disorder, without psychotic features ([facility] [address] ) Yes     Continue trazodone 75mg  Increase prozac to 30mg and can increase further to 40mg if needed in 1-2 weeks      Follow up in 3 months

## 2021-03-07 DIAGNOSIS — R06.81 GERD WITH APNEA: ICD-10-CM

## 2021-03-07 DIAGNOSIS — K21.9 GERD WITH APNEA: ICD-10-CM

## 2021-03-08 RX ORDER — OMEPRAZOLE 40 MG/1
CAPSULE, DELAYED RELEASE ORAL
Qty: 90 CAPSULE | Refills: 0 | Status: SHIPPED | OUTPATIENT
Start: 2021-03-08 | End: 2021-07-01

## 2021-03-31 ENCOUNTER — OFFICE VISIT (OUTPATIENT)
Dept: FAMILY MEDICINE CLINIC | Facility: CLINIC | Age: 31
End: 2021-03-31
Payer: COMMERCIAL

## 2021-03-31 VITALS
HEIGHT: 62 IN | TEMPERATURE: 97.7 F | BODY MASS INDEX: 23.52 KG/M2 | HEART RATE: 72 BPM | RESPIRATION RATE: 18 BRPM | SYSTOLIC BLOOD PRESSURE: 100 MMHG | WEIGHT: 127.8 LBS | OXYGEN SATURATION: 99 % | DIASTOLIC BLOOD PRESSURE: 80 MMHG

## 2021-03-31 DIAGNOSIS — L53.9 LOCALIZED ERYTHEMA: Primary | ICD-10-CM

## 2021-03-31 PROCEDURE — 3008F BODY MASS INDEX DOCD: CPT | Performed by: INTERNAL MEDICINE

## 2021-03-31 PROCEDURE — 99213 OFFICE O/P EST LOW 20 MIN: CPT | Performed by: INTERNAL MEDICINE

## 2021-03-31 NOTE — PROGRESS NOTES
Assessment/Plan:         Diagnoses and all orders for this visit:    Localized erythema     Secondary to COVID vaccination See discussion above    Subjective:      Patient ID: Anna Celeste is a 27 y o  female  HPI   patient is here for an acute visit  Concerned of a local erythematous rash that appeared almost  7 days post vaccination  She mentions local mild warmth pruritus  Does not report any concerning systemic symptoms otherwise especially respiratory issues  I explained to the patient  the delayed local reaction to COVID vaccination is well documented in a recent article [facility] March 3, 2020  Patient had complete resolution of her systemic symptoms at the time of appearance of this rash  Encouraged the patient to take Benadryl continue with cold compresses  Patient can proceed with her 2nd vaccination at the appropriate time  She may expect a similar reactions sooner  In onset than the 1st   Patient does not report any tick bite  The following portions of the patient's history were reviewed and updated as appropriate: allergies, current medications, past family history, past medical history, past social history, past surgical history and problem list     Review of Systems      Objective:      /80 (BP Location: Left arm, Patient Position: Sitting, Cuff Size: Adult)   Pulse 72   Temp 97 7 °F (36 5 °C)   Resp 18   Ht 5' 2" (1 575 m)   Wt 58 kg (127 lb 12 8 oz)   SpO2 99%   BMI 23 37 kg/m²          Physical Exam  Skin:     Findings: Erythema (About six inches oblong  erythematous plaque mildly warm nontender to touch) present

## 2021-04-16 ENCOUNTER — VBI (OUTPATIENT)
Dept: ADMINISTRATIVE | Facility: OTHER | Age: 31
End: 2021-04-16

## 2021-04-18 DIAGNOSIS — G47.00 INSOMNIA, UNSPECIFIED TYPE: ICD-10-CM

## 2021-04-18 DIAGNOSIS — F33.8 SEASONAL AFFECTIVE DISORDER (HCC): ICD-10-CM

## 2021-04-18 DIAGNOSIS — F33.2 SEVERE EPISODE OF RECURRENT MAJOR DEPRESSIVE DISORDER, WITHOUT PSYCHOTIC FEATURES (HCC): ICD-10-CM

## 2021-04-19 RX ORDER — FLUOXETINE 20 MG/1
TABLET, FILM COATED ORAL
Qty: 45 TABLET | Refills: 1 | Status: SHIPPED | OUTPATIENT
Start: 2021-04-19 | End: 2021-07-14 | Stop reason: SDUPTHER

## 2021-04-19 RX ORDER — TRAZODONE HYDROCHLORIDE 150 MG/1
75 TABLET ORAL
Qty: 60 TABLET | Refills: 1 | Status: SHIPPED | OUTPATIENT
Start: 2021-04-19 | End: 2021-09-29 | Stop reason: SDUPTHER

## 2021-05-12 ENCOUNTER — TELEPHONE (OUTPATIENT)
Dept: PSYCHIATRY | Facility: CLINIC | Age: 31
End: 2021-05-12

## 2021-05-12 NOTE — TELEPHONE ENCOUNTER
Nursing made follow up call and spoke with Michael  She reports the Trazadone is helpful for her sleep  The Prozac is not really helping  Michael reports recently her motivation is "at an all time low" She is constantly late to work, Maintaining her house and paying bills are overwhelming  Michael is aware of Dr Savanna Magaña on Monday       Please review on RTO

## 2021-05-12 NOTE — TELEPHONE ENCOUNTER
Patient does not think that the meds are working and spoke with therapist who recommended that she speak  with you about a mood stabilizer

## 2021-05-19 ENCOUNTER — TELEMEDICINE (OUTPATIENT)
Dept: PSYCHIATRY | Facility: CLINIC | Age: 31
End: 2021-05-19
Payer: COMMERCIAL

## 2021-05-19 DIAGNOSIS — G47.00 INSOMNIA, UNSPECIFIED TYPE: Primary | ICD-10-CM

## 2021-05-19 DIAGNOSIS — F41.9 ANXIETY DISORDER, UNSPECIFIED TYPE: ICD-10-CM

## 2021-05-19 DIAGNOSIS — Z56.6 STRESS AT WORK: ICD-10-CM

## 2021-05-19 DIAGNOSIS — F90.9 ATTENTION DEFICIT HYPERACTIVITY DISORDER (ADHD), UNSPECIFIED ADHD TYPE: ICD-10-CM

## 2021-05-19 DIAGNOSIS — F33.2 SEVERE EPISODE OF RECURRENT MAJOR DEPRESSIVE DISORDER, WITHOUT PSYCHOTIC FEATURES (HCC): ICD-10-CM

## 2021-05-19 PROCEDURE — 99214 OFFICE O/P EST MOD 30 MIN: CPT | Performed by: PSYCHIATRY & NEUROLOGY

## 2021-05-19 PROCEDURE — 90833 PSYTX W PT W E/M 30 MIN: CPT | Performed by: PSYCHIATRY & NEUROLOGY

## 2021-05-19 RX ORDER — BUPROPION HYDROCHLORIDE 100 MG/1
100 TABLET, EXTENDED RELEASE ORAL DAILY
Qty: 30 TABLET | Refills: 1 | Status: SHIPPED | OUTPATIENT
Start: 2021-05-19 | End: 2021-07-14 | Stop reason: SDUPTHER

## 2021-05-19 SDOH — HEALTH STABILITY - MENTAL HEALTH: OTHER PHYSICAL AND MENTAL STRAIN RELATED TO WORK: Z56.6

## 2021-05-19 NOTE — BH TREATMENT PLAN
TREATMENT PLAN (Medication Management Only)        [facility]    Name and Date of Birth:  Clarice Mane 27 y o  1990  Date of Treatment Plan: May 19, 2021  Diagnosis/Diagnoses:    1  Insomnia, unspecified type    2  Severe episode of recurrent major depressive disorder, without psychotic features ([address] )    3  Attention deficit hyperactivity disorder (ADHD), unspecified ADHD type    4  Anxiety disorder, unspecified type    5  Stress at work      Strengths/Personal Resources for Self-Care: "I go to work everyday even when I dont feel like it  I have been paying bills that cannot wait  still keeping on top of somethings  Self aware of emotions  good communicator  "   Area/Areas of need (in own words): "working on paying the medical bills", anxiety, depression  1  Long Term Goal: hoping to have a better winter and not such a miserable place like last winter     Target Date:6 months - 11/19/2021  Person/Persons responsible for completion of goal: Dr Tiffanie Robbins  2  Short Term Objective (s) - How will we reach this goal?:   A  Provider new recommended medication/dosage changes and/or continue medication(s): Medication changes: I am having Michael Noguera start on buPROPion  I am also having her maintain her levonorgestrel, fexofenadine, multivitamin, cholecalciferol, clindamycin-benzoyl peroxide, hyoscyamine, cyclobenzaprine, ondansetron, diazepam, omeprazole, traZODone, and FLUoxetine     B  N/A   C  N/A  Target Date:6 months - 11/19/2021  Person/Persons Responsible for Completion of Goal: Dr Yeni Be Goals: continuing treatment  Treatment Modality: medication management every 2 months  Review due 180 days from date of this plan: 6 months - 11/19/2021  Expected length of service: ongoing treatment  My Physician/PA/NP and I have developed this plan together and I agree to work on the goals and objectives   I understand the treatment goals that were developed for my treatment    Treatment Plan done but not signed at time of office visit due to:  Plan reviewed by phone or in person  and verbal consent given due to [place] social distancing

## 2021-05-19 NOTE — PSYCH
Virtual Regular Visit      Assessment/Plan:    Problem List Items Addressed This Visit        Other    Anxiety disorder    Relevant Medications    buPROPion (WELLBUTRIN SR) 100 mg 12 hr tablet    Severe recurrent major depression (HCC)    Relevant Medications    buPROPion (WELLBUTRIN SR) 100 mg 12 hr tablet    Insomnia - Primary    ADHD    Relevant Medications    buPROPion (WELLBUTRIN SR) 100 mg 12 hr tablet    Stress at work                 Reason for visit is   Chief Complaint   Patient presents with    Medication Management    Virtual Regular Visit        Encounter provider Angeline Do MD    Provider located at [address] [address] [phone]      Recent Visits  Date Type Provider Dept   05/12/21 Telephone MD [facility] recent visits within past 7 days and meeting all other requirements     Today's Visits  Date Type Provider Dept   05/19/21 [address], MD [address] today's visits and meeting all other requirements     Future Appointments  No visits were found meeting these conditions  Showing future appointments within next 150 days and meeting all other requirements        The patient was identified by name and date of birth  Susan Wallis was informed that this is a telemedicine visit and that the visit is being conducted through [employer] and patient was informed that this is a secure, HIPAA-compliant platform  She agrees to proceed     My office door was closed  No one else was in the room  She acknowledged consent and understanding of privacy and security of the video platform  The patient has agreed to participate and understands they can discontinue the visit at any time  Patient is aware this is a billable service       Subjective:     Patient ID: Susan Wallis is a 27 y o  female with MDD, insomnia, and ADHD presenting for a follow up visit  She is currently on Prozac, trazodone, and s/p wellbutrin due to worsening anxiety        HPI ROS Appetite Changes and Sleep: she stated that she is having more bad days than good days  She is not going on bike rides which is not like her  She has some bills going into collections due to issues with motivation  She is also not motivated or focused  She did not feel like the prozac was doing enough for her and then she ran out and stopped it  She spoke to her therapist who mentioned a mood stabilizer to discuss with me  She has been sleeping a lot  Appetite is pretty normal  She has not lost more weight and is maintaining  She is isolating some days, not currently  She is stressed at work due to [employer]  She is more comfortable socializing now that most of her family and [place] of friends are vaccinated  She has a problem mostly with motivation  She denied SI/HI  She was taking her older scripts of valium and xanax as needed when she was without SSRI  Review Of Systems:     Mood Anxiety and Depression   Behavior Normal    Thought Content Normal   General Emotional Problems and Decreased Functioning   Personality Normal   Other Psych Symptoms Normal   Constitutional As Noted in HPI   ENT Negative   Cardiovascular Negative   Respiratory Negative   Gastrointestinal Negative   Genitourinary Negative   Musculoskeletal Negative   Integumentary Negative   Neurological Negative   Endocrine Normal    Other Symptoms Normal              Laboratory Results: No results found for this or any previous visit      Substance Abuse History:  Social History     Substance and Sexual Activity   Drug Use No       Family Psychiatric History:   Family History   Problem Relation Age of Onset   [employer] Melanoma Mother     No Known Problems Father     Other Maternal Grandmother         colitis    Diabetes Maternal Grandmother     Hypertension Maternal Grandmother     Stroke Paternal Grandfather     Heart disease Neg Hx     Thyroid disease Neg Hx        The following portions of the patient's history were reviewed and updated as appropriate: allergies, current medications, past family history, past medical history, past social history, past surgical history and problem list     Social History     Socioeconomic History    Marital status: Single     Spouse name: Not on file    Number of children: 0    Years of education: BA in social work    Highest education level: Not on file   Occupational History    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   Social Needs    Financial resource strain: Not on file    Food insecurity     Worry: Not on file     Inability: Not on file   [employer] needs     Medical: Not on file     Non-medical: Not on file   Tobacco Use    Smoking status: Current Every Day Smoker     Types: E-Cigarettes    Smokeless tobacco: Never Used   Substance and Sexual Activity    Alcohol use: Yes     Frequency: Monthly or less     Drinks per session: 1 or 2     Binge frequency: Never     Comment: occasional    Drug use: No    Sexual activity: Not Currently     Birth control/protection: I U D     Lifestyle    Physical activity     Days per week: Not on file     Minutes per session: Not on file    Stress: Not on file   Relationships    Social connections     Talks on phone: Not on file     Gets together: Not on file     Attends [employer] service: Not on file     Active member of club or organization: Not on file     Attends meetings of clubs or organizations: Not on file     Relationship status: Not on file    Intimate partner violence     Fear of current or ex partner: Not on file     Emotionally abused: Not on file     Physically abused: Not on file     Forced sexual activity: Not on file   Other Topics Concern    Not on file   Social History Narrative    Not on file     Social History     Social History Narrative    Not on file     She takes medicinal marijuana for anxiety and depression  Objective:       Mental status:  Appearance calm and cooperative , adequate hygiene and grooming and good eye contact    Mood dysphoric   Affect affect was constricted   Speech a normal rate   Thought Processes coherent/organized and normal thought processes   Hallucinations no hallucinations present    Thought Content no delusions   Abnormal Thoughts no suicidal thoughts  and no homicidal thoughts    Orientation  oriented to person and place and time   Remote Memory short term memory intact and long term memory intact   Attention Span concentration intact   Intellect Appears to be of Average Intelligence   Insight Insight intact   Judgement judgment was intact   Muscle Strength Muscle strength and tone were normal and she is seated   Language no difficulty naming common objects and no difficulty repeating a phrase    Fund of Knowledge displays adequate knowledge of current events and adequate fund of knowledge regarding past history   Pain none   Pain Scale 0       Assessment/Plan:       Diagnoses and all orders for this visit:    Insomnia, unspecified type    Severe episode of recurrent major depressive disorder, without psychotic features (HCC)  -     buPROPion (WELLBUTRIN SR) 100 mg 12 hr tablet; Take 1 tablet (100 mg total) by mouth daily    Attention deficit hyperactivity disorder (ADHD), unspecified ADHD type    Anxiety disorder, unspecified type    Stress at work        Follow up in July as scheduled and next visit will start SSRI  Treatment Recommendations- Risks Benefits      Immediate Medical/Psychiatric/Psychotherapy Treatments and Any Precautions: evaluated the patient again for BPAD and there were no symptoms of sarath or hypomania seen that would require a mood stabilizer  Depression is associated with good days between bad ones   She wishes to restart Wellbutrin as he nausea is better and this was helping with her motivation in the past  Will add SSRI back in 3 months for SAD  She had stopped prozac due to running out and not being sure if it was working  Risks, Benefits And Possible Side Effects Of Medications:  {PSYCH RISK, BENEFITS AND POSSIBLE SIDE EFFECTS discussed    Controlled Medication Discussion: Discussed with patient Black Box warning on concurrent use of benzodiazepines and opioid medications including sedation, respiratory depression, coma and death  Patient understands the risk of treatment with benzodiazepines in addition to opioids and wants to continue taking those medications  , Discussed with patient the risks of sedation, respiratory depression, impairment of ability to drive and potential for abuse and addiction related to treatment with benzodiazepine medications  The patient understands risk of treatment with benzodiazepine medications, agrees to not drive if feels impaired and agrees to take medications as prescribed  and The patient has been filling controlled prescriptions on time as prescribed to [address] [facility] program       Psychotherapy Provided: Individual psychotherapy provided  Goals discussed in session: moods, medication compliance, relationships, isolating, socializing    Counseling provided: 20     I spent 30 minutes with patient today in which greater than 50% of the time was spent in counseling/coordination of care regarding treatment    This note was not shared with the patient due to reasonable likelihood of causing patient harm    VIRTUAL VISIT DISCLAIMER    Leslie Delfino acknowledges that she has consented to an online visit or consultation  She understands that the online visit is based solely on information provided by her, and that, in the absence of a face-to-face physical evaluation by the physician, the diagnosis she receives is both limited and provisional in terms of accuracy and completeness   This is not intended to replace a full medical face-to-face evaluation by the physician  Cleopatra Carranza understands and accepts these terms

## 2021-05-19 NOTE — PATIENT INSTRUCTIONS
Insomnia, unspecified type    Severe episode of recurrent major depressive disorder, without psychotic features (HCC)  -     buPROPion (WELLBUTRIN SR) 100 mg 12 hr tablet; Take 1 tablet (100 mg total) by mouth daily    Attention deficit hyperactivity disorder (ADHD), unspecified ADHD type    Anxiety disorder, unspecified type    Stress at work        Follow up in July as scheduled and next visit will start SSRI

## 2021-06-16 ENCOUNTER — TELEPHONE (OUTPATIENT)
Dept: PSYCHIATRY | Facility: CLINIC | Age: 31
End: 2021-06-16

## 2021-06-16 NOTE — TELEPHONE ENCOUNTER
Patient called for refill for bupropion   Has refill at pharmacy, confirmed with [facility] and will be ready for pickup in about 1 hour

## 2021-07-01 DIAGNOSIS — K21.9 GERD WITH APNEA: ICD-10-CM

## 2021-07-01 DIAGNOSIS — R06.81 GERD WITH APNEA: ICD-10-CM

## 2021-07-01 RX ORDER — OMEPRAZOLE 40 MG/1
CAPSULE, DELAYED RELEASE ORAL
Qty: 90 CAPSULE | Refills: 0 | Status: SHIPPED | OUTPATIENT
Start: 2021-07-01 | End: 2021-10-05

## 2021-07-14 ENCOUNTER — TELEMEDICINE (OUTPATIENT)
Dept: PSYCHIATRY | Facility: CLINIC | Age: 31
End: 2021-07-14
Payer: COMMERCIAL

## 2021-07-14 DIAGNOSIS — F41.9 ANXIETY DISORDER, UNSPECIFIED TYPE: ICD-10-CM

## 2021-07-14 DIAGNOSIS — F33.2 SEVERE EPISODE OF RECURRENT MAJOR DEPRESSIVE DISORDER, WITHOUT PSYCHOTIC FEATURES (HCC): ICD-10-CM

## 2021-07-14 DIAGNOSIS — F33.8 SEASONAL AFFECTIVE DISORDER (HCC): ICD-10-CM

## 2021-07-14 DIAGNOSIS — G47.00 INSOMNIA, UNSPECIFIED TYPE: Primary | ICD-10-CM

## 2021-07-14 DIAGNOSIS — F90.9 ATTENTION DEFICIT HYPERACTIVITY DISORDER (ADHD), UNSPECIFIED ADHD TYPE: ICD-10-CM

## 2021-07-14 PROCEDURE — 99213 OFFICE O/P EST LOW 20 MIN: CPT | Performed by: PSYCHIATRY & NEUROLOGY

## 2021-07-14 RX ORDER — BUPROPION HYDROCHLORIDE 100 MG/1
100 TABLET, EXTENDED RELEASE ORAL DAILY
Qty: 90 TABLET | Refills: 2 | Status: SHIPPED | OUTPATIENT
Start: 2021-07-14 | End: 2021-08-26 | Stop reason: SDUPTHER

## 2021-07-14 RX ORDER — FLUOXETINE 20 MG/1
10 TABLET, FILM COATED ORAL DAILY
Qty: 60 TABLET | Refills: 1 | Status: SHIPPED | OUTPATIENT
Start: 2021-07-14 | End: 2021-09-29 | Stop reason: SDUPTHER

## 2021-07-14 NOTE — PATIENT INSTRUCTIONS
Insomnia, unspecified type    Severe episode of recurrent major depressive disorder, without psychotic features (HCC)  -     FLUoxetine (PROzac) 20 MG tablet; Take 0 5 tablets (10 mg total) by mouth daily For 2 weeks and then increase to 20mg  -     buPROPion (WELLBUTRIN SR) 100 mg 12 hr tablet; Take 1 tablet (100 mg total) by mouth daily    Attention deficit hyperactivity disorder (ADHD), unspecified ADHD type    Anxiety disorder, unspecified type    Seasonal affective disorder (HCC)  -     FLUoxetine (PROzac) 20 MG tablet;  Take 0 5 tablets (10 mg total) by mouth daily For 2 weeks and then increase to 20mg      follow up 2-3 months

## 2021-07-14 NOTE — PSYCH
Virtual Regular Visit    Verification of patient location:    Patient is currently located in the state [facility]  Patient is currently located in a state in which I am licensed      Assessment/Plan:    Problem List Items Addressed This Visit        Other    Anxiety disorder    Relevant Medications    FLUoxetine (PROzac) 20 MG tablet    buPROPion (WELLBUTRIN SR) 100 mg 12 hr tablet    Severe recurrent major depression (HCC)    Relevant Medications    FLUoxetine (PROzac) 20 MG tablet    buPROPion (WELLBUTRIN SR) 100 mg 12 hr tablet    Insomnia - Primary    ADHD    Relevant Medications    FLUoxetine (PROzac) 20 MG tablet    buPROPion (WELLBUTRIN SR) 100 mg 12 hr tablet    Seasonal affective disorder (HCC)    Relevant Medications    FLUoxetine (PROzac) 20 MG tablet    buPROPion (WELLBUTRIN SR) 100 mg 12 hr tablet                    Reason for visit is   Chief Complaint   Patient presents with    Virtual Regular Visit        Encounter provider Dagoberto Reyna MD    Provider located at [address] [place] [id number] [phone]      Recent Visits  No visits were found meeting these conditions  Showing recent visits within past 7 days and meeting all other requirements  Today's Visits  Date Type Provider Dept   07/14/21 1660 S  MD Vinnie Aguilera today's visits and meeting all other requirements  Future Appointments  No visits were found meeting these conditions  Showing future appointments within next 150 days and meeting all other requirements       The patient was identified by name and date of birth  Yari Medina was informed that this is a telemedicine visit and [facility] and patient was informed that this is a secure, HIPAA-compliant platform  She agrees to proceed     My office door was closed  No one else was in the room    She acknowledged consent and understanding of privacy and security of the video platform  The patient has agreed to participate and understands they can discontinue the visit at any time  Patient is aware this is a billable service  Subjective:     Patient ID: Rancho Morales is a 27 y o  female with MDD, insomnia, and ADHD presenting for a follow up visit  She is currently on s/p Prozac, trazodone, and wellbutrin  HPI ROS Appetite Changes and Sleep:  She is doing well  She feels like the re-addition of wellbutrin has been working  It has not affected her appetite or sleep  She is feeling better when she wakes up in the morning  She is still tired when she wakes up to an alarm, but better ad delfino  Mood has been better overall  Her grandmother just moved back in and this is usually a big stress for her, but so far so good  She denied SI/HI  She thinks the anxiety is controlled  Stress at work has not been too bad as compared to other Julys  Review Of Systems:     Mood Anxiety and Depression both controlled   Behavior Normal    Thought Content Normal   General Normal    Personality Normal   Other Psych Symptoms Normal   Constitutional As Noted in HPI   ENT Negative   Cardiovascular Negative   Respiratory Negative   Gastrointestinal Nausea when she is hunger   Genitourinary Negative   Musculoskeletal Negative   Integumentary Negative   Neurological Negative   Endocrine Normal    Other Symptoms Normal              Laboratory Results: No results found for this or any previous visit      Substance Abuse History:  Social History     Substance and Sexual Activity   Drug Use No       Family Psychiatric History:   Family History   Problem Relation Age of Onset   Cha Day Melanoma Mother     No Known Problems Father     Other Maternal Grandmother         colitis    Diabetes Maternal Grandmother     Hypertension Maternal Grandmother     Stroke Paternal Grandfather     Heart disease Neg Hx     Thyroid disease Neg Hx The following portions of the patient's history were reviewed and updated as appropriate: allergies, current medications, past family history, past medical history, past social history, past surgical history and problem list     Social History     Socioeconomic History    Marital status: Single     Spouse name: Not on file    Number of children: 0    Years of education: BA in social work    Highest education level: Not on file   Occupational History    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   Tobacco Use    Smoking status: Current Every Day Smoker     Types: E-Cigarettes    Smokeless tobacco: Never Used   Vaping Use    Vaping Use: Every day   Substance and Sexual Activity    Alcohol use: Yes     Comment: occasional    Drug use: No    Sexual activity: Not Currently     Birth control/protection: I U D  Other Topics Concern    Not on file   Social History Narrative    Not on file     Social Determinants of Health     Financial Resource Strain:     Difficulty of Paying Living Expenses:    Food Insecurity:     Worried About Running Out of Food in the Last Year:     [address] in the Last Year:    Transportation Needs:     Lack of Transportation (Medical):      Lack of Transportation (Non-Medical):    Physical Activity:     Days of Exercise per Week:     Minutes of Exercise per Session:    Stress:     Feeling of Stress :    Social Connections:     Frequency of Communication with Friends and Family:     Frequency of Social Gatherings with Friends and Family:     Attends Gnosticist Services:     Active Member of Clubs or Organizations:     Attends Club or Organization Meetings:     Marital Status:    Intimate Partner Violence:     Fear of Current or Ex-Partner:     Emotionally Abused:     Physically Abused:     Sexually Abused:      Social History     Social History Narrative    Not on file       Objective:       Mental status:  Appearance calm and cooperative , adequate hygiene and grooming and good eye contact    Mood euthymic   Affect affect was broad   Speech a normal rate   Thought Processes coherent/organized and normal thought processes   Hallucinations no hallucinations present    Thought Content no delusions   Abnormal Thoughts no suicidal thoughts  and no homicidal thoughts    Orientation  oriented to person and place and time   Remote Memory short term memory intact and long term memory intact   Attention Span concentration intact   Intellect Appears to be of Average Intelligence   Insight Insight intact   Judgement judgment was intact   Muscle Strength Muscle strength and tone were normal and gait not assessed as she is seated   Language no difficulty naming common objects and no difficulty repeating a phrase    Fund of Knowledge displays adequate knowledge of current events and adequate fund of knowledge regarding past history   Pain none   Pain Scale 0       Assessment/Plan:       Diagnoses and all orders for this visit:    Insomnia, unspecified type    Severe episode of recurrent major depressive disorder, without psychotic features (HCC)  -     FLUoxetine (PROzac) 20 MG tablet; Take 0 5 tablets (10 mg total) by mouth daily For 2 weeks and then increase to 20mg  -     buPROPion (WELLBUTRIN SR) 100 mg 12 hr tablet; Take 1 tablet (100 mg total) by mouth daily    Attention deficit hyperactivity disorder (ADHD), unspecified ADHD type    Anxiety disorder, unspecified type    Seasonal affective disorder (HCC)  -     FLUoxetine (PROzac) 20 MG tablet; Take 0 5 tablets (10 mg total) by mouth daily For 2 weeks and then increase to 20mg      follow up 2-3 months      Treatment Recommendations- Risks Benefits      Immediate Medical/Psychiatric/Psychotherapy Treatments and Any Precautions: she has been doing well on the wellbutrin without it affecting her anxiety, appetite, or sleep  Will now add back SSRI for SAD in the fall and winter   Will start Prozac and get to a goal of 40mg  Risks, Benefits And Possible Side Effects Of Medications:  {PSYCH RISK, BENEFITS AND POSSIBLE SIDE EFFECTS discussed    Controlled Medication Discussion: The patient has been filling controlled prescriptions on time as prescribed to [facility] program           This note was not shared with the patient due to reasonable likelihood of causing patient harm                    I spent 15 minutes with patient today in which greater than 50% of the time was spent in counseling/coordination of care regarding treatment    [address] [facility] verbally agrees to participate in [employer]  Pt is aware that [employer] could be limited without vital signs or the ability to perform a full hands-on physical Scarlet Estrada understands she or the provider may request at any time to terminate the video visit and request the patient to seek care or treatment in person

## 2021-08-10 ENCOUNTER — TELEMEDICINE (OUTPATIENT)
Dept: FAMILY MEDICINE CLINIC | Facility: CLINIC | Age: 31
End: 2021-08-10
Payer: COMMERCIAL

## 2021-08-10 DIAGNOSIS — B34.9 VIRAL INFECTION, UNSPECIFIED: Primary | ICD-10-CM

## 2021-08-10 PROCEDURE — U0005 INFEC AGEN DETEC AMPLI PROBE: HCPCS | Performed by: INTERNAL MEDICINE

## 2021-08-10 PROCEDURE — U0003 INFECTIOUS AGENT DETECTION BY NUCLEIC ACID (DNA OR RNA); SEVERE ACUTE RESPIRATORY SYNDROME CORONAVIRUS 2 (SARS-COV-2) (CORONAVIRUS DISEASE [COVID-19]), AMPLIFIED PROBE TECHNIQUE, MAKING USE OF HIGH THROUGHPUT TECHNOLOGIES AS DESCRIBED BY CMS-2020-01-R: HCPCS | Performed by: INTERNAL MEDICINE

## 2021-08-10 PROCEDURE — 99213 OFFICE O/P EST LOW 20 MIN: CPT | Performed by: INTERNAL MEDICINE

## 2021-08-10 NOTE — PROGRESS NOTES
COVID-19 Outpatient Progress Note    Assessment/Plan:    Problem List Items Addressed This Visit     None      Visit Diagnoses     Viral infection, unspecified    -  Primary    Relevant Orders    Novel Coronavirus (Covid-19),PCR [facility] - Collected at   [facility] or [facility]       work excuse written hydration encouraged patient will keep me posted  Disposition:     I have spent 15 minutes directly with the patient  Greater than 50% of this time was spent in counseling/coordination of care regarding: risks and benefits of treatment options, instructions for management and impressions  Verification of patient location:    Patient is located in the following state in which I hold an active license [place]    Encounter provider Celestina Saeed MD    Provider located at [address] [facility]  [address] [facility] [address] [facility] [address] [place] [address] [phone]    Recent Visits  No visits were found meeting these conditions  Showing recent visits within past 7 days and meeting all other requirements  Today's Visits  Date Type Provider Dept   08/10/21 Telemedicine Celestina Saeed MD [facility] 93 today's visits and meeting all other requirements  Future Appointments  No visits were found meeting these conditions  Showing future appointments within next 150 days and meeting all other requirements     This virtual check-in was done via [employer] and patient was informed that this is a secure, HIPAA-compliant platform  She agrees to proceed  Patient agrees to participate in a virtual check in via telephone or video visit instead of presenting to the office to address urgent/immediate medical needs  Patient is aware this is a billable service  After connecting through [facility], the patient was identified by name and date of birth   Valmckay Nathor was informed that this was a telemedicine visit and that the exam was being conducted confidentially over secure lines  My office door was closed  No one else was in the room  Erica Del Rio acknowledged consent and understanding of privacy and security of the telemedicine visit  I informed the patient that I have reviewed her record in Epic and presented the opportunity for her to ask any questions regarding the visit today  The patient agreed to participate  Subjective:   Erica Del Rio is a 27 y o  female who is concerned about COVID-19  Patient's symptoms include nasal congestion, rhinorrhea, cough, abdominal pain, nausea, diarrhea and headache  Patient denies fever, chills, anosmia, loss of taste, vomiting and myalgias  Date of symptom onset: 8/7/2021    Lab Results   Component Value Date    SARSCOV2 Not Detected 11/11/2020     Past Medical History:   Diagnosis Date    Abdominal pain     Abnormal CAT scan     Anxiety     Anxiety     Blood in stool     Candidiasis of skin     Chronic diarrhea     Chronic fatigue     Colitis     Community acquired bacterial pneumonia     Depression     Dyspepsia     GERD (gastroesophageal reflux disease)     Hemorrhoids     Herpes labialis     IBS (irritable bowel syndrome)     Left ovarian cyst     Morbid obesity (HCC)     Nausea & vomiting     Sleep pattern disturbance     Strain of thoracic spine, initial encounter      Past Surgical History:   Procedure Laterality Date    COLONOSCOPY  12/05/2017    EGD AND COLONOSCOPY N/A 12/5/2017    Procedure: EGD AND COLONOSCOPY;  Surgeon: Sary Vernon MD;  [facility];   Service: Gastroenterology    GASTRECTOMY SLEEVE LAPAROSCOPIC  2019    UPPER GASTROINTESTINAL ENDOSCOPY  12/05/2017    WISDOM TOOTH EXTRACTION      WRIST SURGERY       Current Outpatient Medications   Medication Sig Dispense Refill    buPROPion (WELLBUTRIN SR) 100 mg 12 hr tablet Take 1 tablet (100 mg total) by mouth daily 90 tablet 2    cholecalciferol (VITAMIN D3) 1,000 units tablet Take 1 capsule by mouth      clindamycin-benzoyl peroxide (BENZACLIN) gel Apply topically 2 (two) times a day 25 g 0    cyclobenzaprine (FLEXERIL) 10 mg tablet Take 1 tablet (10 mg total) by mouth 3 (three) times a day as needed for muscle spasms 15 tablet 0    diazepam (VALIUM) 5 mg tablet TAKE 1 TABLET BY MOUTH EVERY 8 HOURS AS NEEDED FOR ANXIETY 30 tablet 1    fexofenadine (ALLEGRA) 60 MG tablet one by mouth daily prn      FLUoxetine (PROzac) 20 MG tablet Take 0 5 tablets (10 mg total) by mouth daily For 2 weeks and then increase to 20mg 60 tablet 1    hyoscyamine (ANASPAZ,LEVSIN) 0 125 MG tablet Take 1 tablet (0 125 mg total) by mouth every 4 (four) hours as needed for cramping 30 tablet 0    levonorgestrel (MIRENA) 20 MCG/24HR IUD 1 each by Intrauterine route      Multiple Vitamin (MULTIVITAMIN) tablet Take 1 tablet by mouth daily      omeprazole (PriLOSEC) 40 MG capsule take 1 capsule by mouth once daily 90 capsule 0    ondansetron (ZOFRAN-ODT) 4 mg disintegrating tablet Take 1 tablet (4 mg total) by mouth every 6 (six) hours as needed for nausea 20 tablet 0    traZODone (DESYREL) 150 mg tablet Take 0 5 tablets (75 mg total) by mouth daily at bedtime as needed for sleep 60 tablet 1     No current facility-administered medications for this visit  Allergies   Allergen Reactions    Other Other (See Comments)     Seasonal, dander, molds- sneezing, ear infections       Review of Systems   Constitutional: Negative for chills and fever  HENT: Positive for congestion and rhinorrhea  Respiratory: Positive for cough  Gastrointestinal: Positive for abdominal pain, diarrhea and nausea  Negative for vomiting  Musculoskeletal: Negative for myalgias  Neurological: Positive for headaches  Objective: There were no vitals filed for this visit  Physical Exam  Constitutional:       Appearance: She is ill-appearing  She is not toxic-appearing  Pulmonary:      Effort: Pulmonary effort is normal  No respiratory distress  Neurological:      Mental Status: She is alert and oriented to person, place, and time  VIRTUAL VISIT DISCLAIMER    Vannessa Dillon verbally agrees to participate in [employer]  Pt is aware that [employer] could be limited without vital signs or the ability to perform a full hands-on physical Thomasene Quiet understands she or the provider may request at any time to terminate the video visit and request the patient to seek care or treatment in person

## 2021-08-10 NOTE — PROGRESS NOTES
Virtual Regular Visit    Verification of patient location:    Patient is located in the following state in which I hold an active license { amb virtual patient location:[address]}      Assessment/Plan:    Problem List Items Addressed This Visit     None               Reason for visit is   Chief Complaint   Patient presents with   31 [id number]     patient states that she has nausea, diarreha, headaches, cough     Virtual Regular Visit        Encounter provider Rosita Mcfarlane MD    Provider located at [address] , [address]  [phone]      Recent Visits  No visits were found meeting these conditions  Showing recent visits within past 7 days and meeting all other requirements  Today's Visits  Date Type Provider Dept   08/10/21 Telemedicine Rosita Mcfarlane MD [facility] 93 today's visits and meeting all other requirements  Future Appointments  No visits were found meeting these conditions  Showing future appointments within next 150 days and meeting all other requirements       The patient was identified by name and date of birth  Rachael Howard was informed that this is a telemedicine visit and that the visit is being conducted through {AMB VIRTUAL VISIT AFOCCY:12924}  {Telemedicine confidentiality :74695} {Telemedicine participants:43927}  She acknowledged consent and understanding of privacy and security of the video platform  The patient has agreed to participate and understands they can discontinue the visit at any time  Patient is aware this is a billable service  Ronni Bee is a 27 y o  female ***         HPI     Past Medical History:   Diagnosis Date    Abdominal pain     Abnormal CAT scan     Anxiety     Anxiety     Blood in stool     Candidiasis of skin     Chronic diarrhea     Chronic fatigue     Colitis     Community acquired bacterial pneumonia     Depression     Dyspepsia     GERD (gastroesophageal reflux disease)     Hemorrhoids     Herpes labialis     IBS (irritable bowel syndrome)     Left ovarian cyst     Morbid obesity (HCC)     Nausea & vomiting     Sleep pattern disturbance     Strain of thoracic spine, initial encounter        Past Surgical History:   Procedure Laterality Date    COLONOSCOPY  12/05/2017    EGD AND COLONOSCOPY N/A 12/5/2017    Procedure: EGD AND COLONOSCOPY;  Surgeon: Jose Garcia MD;  [facility];   Service: Gastroenterology    GASTRECTOMY SLEEVE LAPAROSCOPIC  2019    UPPER GASTROINTESTINAL ENDOSCOPY  12/05/2017    WISDOM TOOTH EXTRACTION      WRIST SURGERY         Current Outpatient Medications   Medication Sig Dispense Refill    buPROPion (WELLBUTRIN SR) 100 mg 12 hr tablet Take 1 tablet (100 mg total) by mouth daily 90 tablet 2    cholecalciferol (VITAMIN D3) 1,000 units tablet Take 1 capsule by mouth      clindamycin-benzoyl peroxide (BENZACLIN) gel Apply topically 2 (two) times a day 25 g 0    cyclobenzaprine (FLEXERIL) 10 mg tablet Take 1 tablet (10 mg total) by mouth 3 (three) times a day as needed for muscle spasms 15 tablet 0    diazepam (VALIUM) 5 mg tablet TAKE 1 TABLET BY MOUTH EVERY 8 HOURS AS NEEDED FOR ANXIETY 30 tablet 1    fexofenadine (ALLEGRA) 60 MG tablet one by mouth daily prn      FLUoxetine (PROzac) 20 MG tablet Take 0 5 tablets (10 mg total) by mouth daily For 2 weeks and then increase to 20mg 60 tablet 1    hyoscyamine (ANASPAZ,LEVSIN) 0 125 MG tablet Take 1 tablet (0 125 mg total) by mouth every 4 (four) hours as needed for cramping 30 tablet 0    levonorgestrel (MIRENA) 20 MCG/24HR IUD 1 each by Intrauterine route      Multiple Vitamin (MULTIVITAMIN) tablet Take 1 tablet by mouth daily      omeprazole (PriLOSEC) 40 MG capsule take 1 capsule by mouth once daily 90 capsule 0    ondansetron (ZOFRAN-ODT) 4 mg disintegrating tablet Take 1 tablet (4 mg total) by mouth every 6 (six) hours as needed for nausea 20 tablet 0    traZODone (DESYREL) 150 mg tablet Take 0 5 tablets (75 mg total) by mouth daily at bedtime as needed for sleep 60 tablet 1     No current facility-administered medications for this visit  Allergies   Allergen Reactions    Other Other (See Comments)     Seasonal, dander, molds- sneezing, ear infections       Review of Systems    Video Exam    There were no vitals filed for this visit  Physical Exam     {Time Spent:04729}    VIRTUAL VISIT DISCLAIMER      Abdelrahman Riley verbally agrees to participate in [employer]  Pt is aware that [employer] could be limited without vital signs or the ability to perform a full hands-on physical Mary Sidhu understands she or the provider may request at any time to terminate the video visit and request the patient to seek care or treatment in person

## 2021-08-11 ENCOUNTER — VBI (OUTPATIENT)
Dept: ADMINISTRATIVE | Facility: OTHER | Age: 31
End: 2021-08-11

## 2021-08-12 ENCOUNTER — TELEPHONE (OUTPATIENT)
Dept: FAMILY MEDICINE CLINIC | Facility: CLINIC | Age: 31
End: 2021-08-12

## 2021-08-12 NOTE — TELEPHONE ENCOUNTER
Patient called requesting a letter to release to go back to work  Letter was already done and sent to patient

## 2021-08-26 DIAGNOSIS — F33.2 SEVERE EPISODE OF RECURRENT MAJOR DEPRESSIVE DISORDER, WITHOUT PSYCHOTIC FEATURES (HCC): ICD-10-CM

## 2021-08-27 RX ORDER — BUPROPION HYDROCHLORIDE 100 MG/1
100 TABLET, EXTENDED RELEASE ORAL DAILY
Qty: 90 TABLET | Refills: 2 | Status: SHIPPED | OUTPATIENT
Start: 2021-08-27 | End: 2022-01-18

## 2021-09-29 ENCOUNTER — TELEMEDICINE (OUTPATIENT)
Dept: PSYCHIATRY | Facility: CLINIC | Age: 31
End: 2021-09-29
Payer: COMMERCIAL

## 2021-09-29 DIAGNOSIS — F41.9 ANXIETY DISORDER, UNSPECIFIED TYPE: ICD-10-CM

## 2021-09-29 DIAGNOSIS — Z56.6 STRESS AT WORK: ICD-10-CM

## 2021-09-29 DIAGNOSIS — F90.9 ATTENTION DEFICIT HYPERACTIVITY DISORDER (ADHD), UNSPECIFIED ADHD TYPE: ICD-10-CM

## 2021-09-29 DIAGNOSIS — F33.2 SEVERE EPISODE OF RECURRENT MAJOR DEPRESSIVE DISORDER, WITHOUT PSYCHOTIC FEATURES (HCC): Primary | ICD-10-CM

## 2021-09-29 DIAGNOSIS — F33.8 SEASONAL AFFECTIVE DISORDER (HCC): ICD-10-CM

## 2021-09-29 DIAGNOSIS — G47.00 INSOMNIA, UNSPECIFIED TYPE: ICD-10-CM

## 2021-09-29 PROCEDURE — 90833 PSYTX W PT W E/M 30 MIN: CPT | Performed by: PSYCHIATRY & NEUROLOGY

## 2021-09-29 PROCEDURE — 99214 OFFICE O/P EST MOD 30 MIN: CPT | Performed by: PSYCHIATRY & NEUROLOGY

## 2021-09-29 RX ORDER — TRAZODONE HYDROCHLORIDE 150 MG/1
75 TABLET ORAL
Qty: 60 TABLET | Refills: 1 | Status: SHIPPED | OUTPATIENT
Start: 2021-09-29 | End: 2022-01-18

## 2021-09-29 RX ORDER — FLUOXETINE 20 MG/1
10 TABLET, FILM COATED ORAL DAILY
Qty: 90 TABLET | Refills: 2 | Status: SHIPPED | OUTPATIENT
Start: 2021-09-29 | End: 2022-01-18

## 2021-09-29 SDOH — HEALTH STABILITY - MENTAL HEALTH: OTHER PHYSICAL AND MENTAL STRAIN RELATED TO WORK: Z56.6

## 2021-09-29 NOTE — PSYCH
Virtual Regular Visit    Verification of patient location:    Patient is located in the following state in which I hold an active license [place]      Assessment/Plan:    Problem List Items Addressed This Visit        Other    Anxiety disorder    Severe recurrent major depression ([facility] [address] ) - Primary    Insomnia    ADHD    Stress at work    Seasonal affective disorder [facility])                   Reason for visit is   Chief Complaint   Patient presents with    Medication Management    Virtual Regular Visit        Encounter provider Nancy Feldman MD    Provider located at [address] [place] [id number] [phone]      Recent Visits  No visits were found meeting these conditions  Showing recent visits within past 7 days and meeting all other requirements  Today's Visits  Date Type Provider Dept   09/29/21 [address], MD [address] today's visits and meeting all other requirements  Future Appointments  No visits were found meeting these conditions  Showing future appointments within next 150 days and meeting all other requirements       The patient was identified by name and date of birth  Terry Hudson was informed that this is a telemedicine visit and [facility] the [facility] and patient was informed that this is a secure, HIPAA-compliant platform  She agrees to proceed     My office door was closed  No one else was in the room  She acknowledged consent and understanding of privacy and security of the video platform  The patient has agreed to participate and understands they can discontinue the visit at any time  Patient is aware this is a billable service  Subjective:     Patient ID: Terry Hudson is a 27 y o  female with MDD, insomnia, and ADHD presenting for a follow up visit  She is currently on trazodone, and wellbutrin   Prozac was started last visit to combat SAD  HPI ROS Appetite Changes and Sleep: she stated that she never got the Prozac from her Pharmacy  She has only been taking the Wellbutrin  She never followed up with her pharmacy for that script  Her mood has been okay  She is still always tired and has been very achy lately for which she has been getting massages  Work has been very stressful and when she just gets home she wants to relax  Things with her boyfriend are going well  She denied being pregnant  She is taking out her IUD next month and then switching to oral OCP  She has been sleeping well with Trazodone 75mg and she is not very groggy in the morning  Appetite has been a struggle  She doesn't get an appetite until about 11-12pm and then she cannot force herself to eat sooner  After dinner she can snack  Her weight has not been affected, usually fluctuating within 10 lbs and usually at 125lbs  She denied SI/HI  Review Of Systems:     Mood Normal   Behavior Normal    Thought Content Normal   General Emotional Problems   Personality Normal   Other Psych Symptoms Normal   Constitutional As Noted in HPI   ENT allergies and coughing in the morning   Cardiovascular Negative   Respiratory Negative   Gastrointestinal Negative   Genitourinary As Noted in HPI   Musculoskeletal As Noted in HPI   Integumentary Negative   Neurological Negative   Endocrine Normal    Other Symptoms Normal              Laboratory Results: Results for Moon De Leon (MRN [id number]) as of 9/29/2021 11:16   Ref   Range 8/10/2021 12:53   SARS-COV-2 Latest Ref Range: Negative  Negative   NOVEL CORONAVIRUS (COVID-19), PCR [facility] Unknown Rpt       Substance Abuse History:  Social History     Substance and Sexual Activity   Drug Use No       Family Psychiatric History:   Family History   Problem Relation Age of Onset   [facility] Melanoma Mother     No Known Problems Father     Other Maternal Grandmother         colitis    Diabetes Maternal Grandmother     Hypertension Maternal Grandmother     Stroke Paternal Grandfather     Heart disease Neg Hx     Thyroid disease Neg Hx        The following portions of the patient's history were reviewed and updated as appropriate: allergies, current medications, past family history, past medical history, past social history, past surgical history and problem list     Social History     Socioeconomic History    Marital status: Single     Spouse name: Not on file    Number of children: 0    Years of education: BA in social work    Highest education level: Not on file   Occupational History    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   Tobacco Use    Smoking status: Current Every Day Smoker     Types: E-Cigarettes    Smokeless tobacco: Never Used   Vaping Use    Vaping Use: Every day   Substance and Sexual Activity    Alcohol use: Yes     Comment: occasional    Drug use: No    Sexual activity: Not Currently     Birth control/protection: I U D  Other Topics Concern    Not on file   Social History Narrative    Not on file     Social Determinants of Health     Financial Resource Strain:     Difficulty of Paying Living Expenses:    Food Insecurity:     Worried About Running Out of Food in the Last Year:     [address] in the Last Year:    Transportation Needs:     Lack of Transportation (Medical):      Lack of Transportation (Non-Medical):    Physical Activity:     Days of Exercise per Week:     Minutes of Exercise per Session:    Stress:     Feeling of Stress :    Social Connections:     Frequency of Communication with Friends and Family:     Frequency of Social Gatherings with Friends and Family:     Attends Sikhism Services:     Active Member of Clubs or Organizations:     Attends Club or Organization Meetings:     Marital Status:    Intimate Partner Violence:     Fear of Current or Ex-Partner:     Emotionally Abused:     Physically Abused:     Sexually Abused:      Social History     Social History Narrative    Not on file       Objective:       Mental status:  Appearance calm and cooperative , adequate hygiene and grooming and good eye contact    Mood euthymic   Affect affect was constricted   Speech a normal rate   Thought Processes coherent/organized and normal thought processes   Hallucinations no hallucinations present    Thought Content no delusions   Abnormal Thoughts no suicidal thoughts  and no homicidal thoughts    Orientation  oriented to person and place and time   Remote Memory short term memory intact and long term memory intact   Attention Span concentration intact   Intellect Appears to be of Average Intelligence   Insight Insight intact   Judgement judgment was intact   Muscle Strength Muscle strength and tone were normal and gait not assessed   Language no difficulty naming common objects and no difficulty repeating a phrase    Fund of Knowledge displays adequate knowledge of current events and adequate fund of knowledge regarding past history   Pain none   Pain Scale 0       Assessment/Plan:       Diagnoses and all orders for this visit:    Severe episode of recurrent major depressive disorder, without psychotic features ([facility] [address] )    Insomnia, unspecified type    Attention deficit hyperactivity disorder (ADHD), unspecified ADHD type    Anxiety disorder, unspecified type    Seasonal affective disorder ([facility] [address] )    Stress at work        Continue wellbutrin and trazodone  Continue therapy  Restart Prozac titrate to 40mg within 6 weeks  Use 10,000 lux light every morning 30 mins until spring  Follow up in 4 months    Treatment Recommendations- Risks Benefits      Immediate Medical/Psychiatric/Psychotherapy Treatments and Any Precautions: she never got the prozac from the pharmacy in July and thus has not preemptively started it for the prevention of SAD  She will start it today and also start to use her light  She is doing well otherwise      Risks, Benefits And Possible Side Effects Of Medications:  {PSYCH RISK, BENEFITS AND POSSIBLE SIDE EFFECTS (Optional):02690    Controlled Medication Discussion: Discussed with patient the risks of sedation, respiratory depression, impairment of ability to drive and potential for abuse and addiction related to treatment with benzodiazepine medications  The patient understands risk of treatment with benzodiazepine medications, agrees to not drive if feels impaired and agrees to take medications as prescribed  and The patient has been filling controlled prescriptions on time as prescribed to [facility] program       Psychotherapy Provided: Individual psychotherapy provided  Goals discussed in session: SAD, light therapy, potential for family planning in the future, stress at work    Counseling provided: 16      I spent 25 minutes with patient today in which greater than 50% of the time was spent in counseling/coordination of care regarding treatement   This note was not shared with the patient due to reasonable likelihood of causing patient harm      VIRTUAL VISIT DISCLAIMER    Ygnacio Severe verbally agrees to participate in [employer]  Pt is aware that [employer] could be limited without vital signs or the ability to perform a full hands-on physical Francia Mccauley understands she or the provider may request at any time to terminate the video visit and request the patient to seek care or treatment in person

## 2021-09-29 NOTE — BH TREATMENT PLAN
TREATMENT PLAN (Medication Management Only)        [facility]    Name and Date of Birth:  Vannessa Dillon 27 y o  1990  Date of Treatment Plan: September 29, 2021  Diagnosis/Diagnoses:    1  Severe episode of recurrent major depressive disorder, without psychotic features ([facility] [address] )    2  Insomnia, unspecified type    3  Attention deficit hyperactivity disorder (ADHD), unspecified ADHD type    4  Anxiety disorder, unspecified type    5  Seasonal affective disorder ([facility] [address] )    6  Stress at work      Strengths/Personal Resources for Self-Care: "continuing to work through the pandemic  self aware  staying active with my mind and body  "  Area/Areas of need (in own words): "memory at work  "  1  Long Term Goal: to be less stressed and be more staffed at work  to have less of a hectic work load     Target Date:6 months - 3/29/2022  Person/Persons responsible for completion of goal: Dr Nereyda Way Dose  2  Short Term Objective (s) - How will we reach this goal?:   A  Provider new recommended medication/dosage changes and/or continue medication(s): Medication changes: I have changed Michael Noguera's FLUoxetine  I am also having her maintain her levonorgestrel, fexofenadine, multivitamin, cholecalciferol, clindamycin-benzoyl peroxide, hyoscyamine, cyclobenzaprine, ondansetron, diazepam, omeprazole, buPROPion, and traZODone     B  N/A   C  N/A  Target Date:6 months - 3/29/2022  Person/Persons Responsible for Completion of Goal: Dr Nereyda Be Dose  Progress Towards Goals: continuing treatment  Treatment Modality: medication management every 4 months  Review due 180 days from date of this plan: 6 months - 3/29/2022  Expected length of service: ongoing treatment  My Physician/PA/NP and I have developed this plan together and I agree to work on the goals and objectives  I understand the treatment goals that were developed for my treatment    Treatment Plan done but not signed at time of office visit due to:  Plan reviewed by phone or in person  and verbal consent given due to [place] social distancing

## 2021-09-29 NOTE — PATIENT INSTRUCTIONS
Severe episode of recurrent major depressive disorder, without psychotic features ([facility] [address] )    Insomnia, unspecified type    Attention deficit hyperactivity disorder (ADHD), unspecified ADHD type    Anxiety disorder, unspecified type    Seasonal affective disorder ([facility] [address] )    Stress at work        Continue wellbutrin and trazodone  Continue therapy  Restart Prozac titrate to 40mg within 6 weeks  Follow up in 4 months

## 2021-10-05 DIAGNOSIS — K21.9 GERD WITH APNEA: ICD-10-CM

## 2021-10-05 DIAGNOSIS — R06.81 GERD WITH APNEA: ICD-10-CM

## 2021-10-05 RX ORDER — OMEPRAZOLE 40 MG/1
CAPSULE, DELAYED RELEASE ORAL
Qty: 90 CAPSULE | Refills: 0 | Status: SHIPPED | OUTPATIENT
Start: 2021-10-05 | End: 2022-01-05

## 2021-10-13 ENCOUNTER — VBI (OUTPATIENT)
Dept: ADMINISTRATIVE | Facility: OTHER | Age: 31
End: 2021-10-13

## 2021-11-11 ENCOUNTER — OFFICE VISIT (OUTPATIENT)
Dept: OBGYN CLINIC | Facility: MEDICAL CENTER | Age: 31
End: 2021-11-11
Payer: COMMERCIAL

## 2021-11-11 VITALS
SYSTOLIC BLOOD PRESSURE: 98 MMHG | DIASTOLIC BLOOD PRESSURE: 60 MMHG | WEIGHT: 127 LBS | BODY MASS INDEX: 23.37 KG/M2 | HEIGHT: 62 IN

## 2021-11-11 DIAGNOSIS — Z30.011 ENCOUNTER FOR INITIAL PRESCRIPTION OF CONTRACEPTIVE PILLS: Primary | ICD-10-CM

## 2021-11-11 PROBLEM — E66.01 OBESITY, CLASS III, BMI 40-49.9 (MORBID OBESITY) (HCC): Status: RESOLVED | Noted: 2018-03-27 | Resolved: 2021-11-11

## 2021-11-11 PROCEDURE — 1036F TOBACCO NON-USER: CPT | Performed by: NURSE PRACTITIONER

## 2021-11-11 PROCEDURE — 99213 OFFICE O/P EST LOW 20 MIN: CPT | Performed by: NURSE PRACTITIONER

## 2021-11-11 RX ORDER — DESOGESTREL AND ETHINYL ESTRADIOL 21-5 (28)
1 KIT ORAL DAILY
Qty: 30 TABLET | Refills: 3 | Status: SHIPPED | OUTPATIENT
Start: 2021-11-11 | End: 2022-07-06

## 2021-11-30 ENCOUNTER — ANNUAL EXAM (OUTPATIENT)
Dept: OBGYN CLINIC | Facility: MEDICAL CENTER | Age: 31
End: 2021-11-30
Payer: COMMERCIAL

## 2021-11-30 VITALS
HEIGHT: 61 IN | WEIGHT: 128.2 LBS | DIASTOLIC BLOOD PRESSURE: 60 MMHG | BODY MASS INDEX: 24.2 KG/M2 | SYSTOLIC BLOOD PRESSURE: 122 MMHG

## 2021-11-30 DIAGNOSIS — Z01.419 WELL WOMAN EXAM WITH ROUTINE GYNECOLOGICAL EXAM: Primary | ICD-10-CM

## 2021-11-30 DIAGNOSIS — Z30.432 ENCOUNTER FOR IUD REMOVAL: ICD-10-CM

## 2021-11-30 DIAGNOSIS — Z30.011 ENCOUNTER FOR INITIAL PRESCRIPTION OF CONTRACEPTIVE PILLS: ICD-10-CM

## 2021-11-30 LAB
25(OH)D3 SERPL-MCNC: 44 NG/ML (ref 30–100)
ANA SER QL IF: NEGATIVE
CCP IGG SERPL-ACNC: <16 UNITS
ENA SS-A AB SER IA-ACNC: NORMAL AI
ENA SS-B AB SER IA-ACNC: NORMAL AI
ERYTHROCYTE [SEDIMENTATION RATE] IN BLOOD BY WESTERGREN METHOD: 2 MM/H
RHEUMATOID FACT SERPL-ACNC: <14 IU/ML

## 2021-11-30 PROCEDURE — 58301 REMOVE INTRAUTERINE DEVICE: CPT | Performed by: NURSE PRACTITIONER

## 2021-11-30 PROCEDURE — G0145 SCR C/V CYTO,THINLAYER,RESCR: HCPCS | Performed by: NURSE PRACTITIONER

## 2021-11-30 PROCEDURE — 3008F BODY MASS INDEX DOCD: CPT | Performed by: NURSE PRACTITIONER

## 2021-11-30 PROCEDURE — G0476 HPV COMBO ASSAY CA SCREEN: HCPCS | Performed by: NURSE PRACTITIONER

## 2021-11-30 PROCEDURE — S0612 ANNUAL GYNECOLOGICAL EXAMINA: HCPCS | Performed by: NURSE PRACTITIONER

## 2021-12-02 LAB
HPV HR 12 DNA CVX QL NAA+PROBE: NEGATIVE
HPV16 DNA CVX QL NAA+PROBE: NEGATIVE
HPV18 DNA CVX QL NAA+PROBE: NEGATIVE

## 2021-12-07 LAB
LAB AP GYN PRIMARY INTERPRETATION: NORMAL
LAB AP LMP: NORMAL
Lab: NORMAL

## 2021-12-30 ENCOUNTER — VBI (OUTPATIENT)
Dept: ADMINISTRATIVE | Facility: OTHER | Age: 31
End: 2021-12-30

## 2022-01-04 DIAGNOSIS — F41.8 OTHER SPECIFIED ANXIETY DISORDERS: ICD-10-CM

## 2022-01-04 DIAGNOSIS — R11.0 NAUSEA: ICD-10-CM

## 2022-01-04 RX ORDER — ONDANSETRON 4 MG/1
4 TABLET, ORALLY DISINTEGRATING ORAL EVERY 6 HOURS PRN
Qty: 20 TABLET | Refills: 0 | Status: SHIPPED | OUTPATIENT
Start: 2022-01-04 | End: 2022-04-05

## 2022-01-04 RX ORDER — DIAZEPAM 5 MG/1
5 TABLET ORAL EVERY 8 HOURS PRN
Qty: 30 TABLET | Refills: 1 | Status: SHIPPED | OUTPATIENT
Start: 2022-01-04

## 2022-01-05 DIAGNOSIS — R06.81 GERD WITH APNEA: ICD-10-CM

## 2022-01-05 DIAGNOSIS — K21.9 GERD WITH APNEA: ICD-10-CM

## 2022-01-05 RX ORDER — OMEPRAZOLE 40 MG/1
CAPSULE, DELAYED RELEASE ORAL
Qty: 90 CAPSULE | Refills: 0 | Status: SHIPPED | OUTPATIENT
Start: 2022-01-05 | End: 2022-04-05

## 2022-01-18 ENCOUNTER — OFFICE VISIT (OUTPATIENT)
Dept: PSYCHIATRY | Facility: CLINIC | Age: 32
End: 2022-01-18
Payer: COMMERCIAL

## 2022-01-18 DIAGNOSIS — G47.00 INSOMNIA, UNSPECIFIED TYPE: ICD-10-CM

## 2022-01-18 DIAGNOSIS — F33.0 MAJOR DEPRESSIVE DISORDER, RECURRENT, MILD (HCC): Primary | ICD-10-CM

## 2022-01-18 DIAGNOSIS — F90.2 ATTENTION DEFICIT HYPERACTIVITY DISORDER (ADHD), COMBINED TYPE: ICD-10-CM

## 2022-01-18 DIAGNOSIS — F33.8 SEASONAL AFFECTIVE DISORDER (HCC): ICD-10-CM

## 2022-01-18 PROCEDURE — 99214 OFFICE O/P EST MOD 30 MIN: CPT | Performed by: PSYCHIATRY & NEUROLOGY

## 2022-01-18 RX ORDER — FLUOXETINE 20 MG/1
20 TABLET, FILM COATED ORAL DAILY
Qty: 30 TABLET | Refills: 2 | Status: SHIPPED | OUTPATIENT
Start: 2022-01-18 | End: 2022-03-23

## 2022-01-18 RX ORDER — BUPROPION HYDROCHLORIDE 300 MG/1
300 TABLET ORAL DAILY
Qty: 30 TABLET | Refills: 1 | Status: SHIPPED | OUTPATIENT
Start: 2022-01-25 | End: 2022-03-16 | Stop reason: SDUPTHER

## 2022-01-18 RX ORDER — BUPROPION HYDROCHLORIDE 150 MG/1
150 TABLET ORAL DAILY
Qty: 7 TABLET | Refills: 0 | Status: SHIPPED | OUTPATIENT
Start: 2022-01-18 | End: 2022-03-23

## 2022-01-18 RX ORDER — TRAZODONE HYDROCHLORIDE 150 MG/1
75 TABLET ORAL
Qty: 30 TABLET | Refills: 1 | Status: SHIPPED | OUTPATIENT
Start: 2022-01-18 | End: 2022-03-23 | Stop reason: SDUPTHER

## 2022-01-18 NOTE — PSYCH
Keith Kaba 1990 [id number]      The patient was seen for continuing care and follow-up treatment of depression and anxiety  She reports that she was feeling relatively fine until recently when a number of stressors occurred  Her paternal aunt was found dead and there was a question about negligence and there were some investigations going on  Her mother who lived right behind her house decided to move to [place]  She finds work quite overwhelming  Her sleep pattern has been variable  No changes in her appetite  No change in her living situation  She still lives with her grandmother and her boyfriend  She has been taking only 20 mg of Prozac as well as bupropion  mg daily  Difficulties with focus and concentration has been a problem since childhood and she has been diagnosed with ADHD  She is currently seeing a psychotherapist on a weekly basis      ROS:  Interrupted sleep-anxiety-feeling overwhelmed-difficulty concentrating and staying focused  Current Mental Status Evaluation:  Appearance:  Adequate hygiene and grooming, younger than stated age and Good eye contact   Behavior:  calm and cooperative   Mood:  Depressed and Anxious   Affect: appropriate, broad and Tearful   Speech: Normal rate and Normal volume   Thought Process:  Goal directed and coherent   Thought Content:  Does not verbalize delusional material   Perceptual Disturbances: Denies hallucinations and does not appear to be responding to internal stimuli   Risk Potential: No suicidal or homicidal ideation   Orientation:        1  Major depressive disorder, recurrent, mild (HCC)  buPROPion (Wellbutrin XL) 150 mg 24 hr tablet    buPROPion (Wellbutrin XL) 300 mg 24 hr tablet    FLUoxetine (PROzac) 20 MG tablet    traZODone (DESYREL) 150 mg tablet   2  Seasonal affective disorder ([address] )     3  Insomnia, unspecified type     4   Attention deficit hyperactivity disorder (ADHD), combined type  buPROPion (Wellbutrin XL) 150 mg 24 hr tablet    buPROPion (Wellbutrin XL) 300 mg 24 hr tablet          Current Outpatient Medications   Medication Instructions    buPROPion (WELLBUTRIN XL) 150 mg, Oral, Daily    [START ON 1/25/2022] buPROPion (WELLBUTRIN XL) 300 mg, Oral, Daily    cholecalciferol (VITAMIN D3) 1,000 units tablet 1 capsule, Oral    cyclobenzaprine (FLEXERIL) 10 mg, Oral, 3 times daily PRN    desogestrel-ethinyl estradiol (KARIVA) 0 15-0 02/0 01 MG (21/5) per tablet 1 tablet, Oral, Daily    diazepam (VALIUM) 5 mg, Oral, Every 8 hours PRN    fexofenadine (ALLEGRA) 60 MG tablet one by mouth daily prn    FLUoxetine (PROZAC) 20 mg, Oral, Daily, For 2 weeks and then increase to 20mg for 2 weeks then increase to 30mg for 2 weeks then 40mg thereafter    hyoscyamine (ANASPAZ,LEVSIN) 0 125 mg, Oral, Every 4 hours PRN    Multiple Vitamin (MULTIVITAMIN) tablet 1 tablet, Oral, Daily    omeprazole (PriLOSEC) 40 MG capsule take 1 capsule by mouth once daily    ondansetron (ZOFRAN-ODT) 4 mg, Oral, Every 6 hours PRN    traZODone (DESYREL) 75 mg, Oral, Daily at bedtime PRN          Treatment Recommendations- Risks Benefits    Titrate up bupropion from 150 milligrams to 300 milligrams after a week-continue with fluoxetine-continue with trazodone-continue with diazepam p r n  Risks, Benefits And Possible Side Effects Of Medications:  Risks, benefits, and possible side effects of medications explained to patient and patient verbalizes understanding    VIRTUAL VISIT DISCLAIMER    Bernardo Humphreys verbally agrees to participate in [employer]  Pt is aware that [employer] could be limited without vital signs or the ability to perform a full hands-on physical exam  Bernardo Humphreys understands she or the provider may request at any time to terminate the video visit and request the patient to seek care or treatment in person       Sudhir Rehman MD 01/18/22

## 2022-01-20 ENCOUNTER — TELEMEDICINE (OUTPATIENT)
Dept: FAMILY MEDICINE CLINIC | Facility: CLINIC | Age: 32
End: 2022-01-20
Payer: COMMERCIAL

## 2022-01-20 ENCOUNTER — TELEPHONE (OUTPATIENT)
Dept: FAMILY MEDICINE CLINIC | Facility: CLINIC | Age: 32
End: 2022-01-20

## 2022-01-20 VITALS — WEIGHT: 124 LBS | HEIGHT: 60 IN | TEMPERATURE: 97.9 F | BODY MASS INDEX: 24.35 KG/M2

## 2022-01-20 DIAGNOSIS — U07.1 COVID-19: Primary | ICD-10-CM

## 2022-01-20 PROCEDURE — 1036F TOBACCO NON-USER: CPT | Performed by: INTERNAL MEDICINE

## 2022-01-20 PROCEDURE — 3008F BODY MASS INDEX DOCD: CPT | Performed by: INTERNAL MEDICINE

## 2022-01-20 PROCEDURE — 99213 OFFICE O/P EST LOW 20 MIN: CPT | Performed by: INTERNAL MEDICINE

## 2022-01-20 NOTE — PROGRESS NOTES
COVID-19 Outpatient Progress Note    Assessment/Plan:    Problem List Items Addressed This Visit     None      Visit Diagnoses     COVID-19    -  Primary    Relevant Orders    COVID Only- Collected at [facility] or [facility]       CDC guidelines discussed  Disposition:     I have spent 15 minutes directly with the patient  Greater than 50% of this time was spent in counseling/coordination of care regarding: risks and benefits of treatment options, instructions for management, patient and family education and impressions  Encounter provider Liane Sultana MD    Provider located at [address] [place] [address] 264, [facility] [address] Jenna Ramirez , [facility] [address]  [facility] [phone]    Recent Visits  No visits were found meeting these conditions  Showing recent visits within past 7 days and meeting all other requirements  Today's Visits  Date Type Provider Dept   01/20/22 Telephone [address] [facility]   01/20/22 Telemedicine Liane Sultana MD [address] today's visits and meeting all other requirements  Future Appointments  No visits were found meeting these conditions  Showing future appointments within next 150 days and meeting all other requirements     This virtual check-in was done via [facility] and patient was informed that this is a secure, HIPAA-compliant platform  She agrees to proceed  Patient agrees to participate in a virtual check in via telephone or video visit instead of presenting to the office to address urgent/immediate medical needs  Patient is aware this is a billable service  After connecting through [facility], the patient was identified by name and date of birth  Chato Campbell was informed that this was a telemedicine visit and that the exam was being conducted confidentially over secure lines  My office door was closed  No one else was in the room   Chato Campbell acknowledged consent and understanding of privacy and security of the telemedicine visit  I informed the patient that I have reviewed her record in Epic and presented the opportunity for her to ask any questions regarding the visit today  The patient agreed to participate  Verification of patient location:  Patient is located in the following state in which I hold an active license: [place]    Subjective:   Kaila Ortiz is a 32 y o  female who is concerned about COVID-19  Patient's symptoms include fatigue, malaise, nasal congestion, sore throat, cough and headache  Patient denies fever, chills, rhinorrhea, shortness of breath, chest tightness, abdominal pain, nausea, vomiting and diarrhea  - Date of symptom onset: 1/18/2022      COVID-19 vaccination status: Fully vaccinated (primary series)    Lab Results   Component Value Date    SARSCOV2 Negative 08/10/2021    SARSCOV2 Not Detected 11/11/2020     Past Medical History:   Diagnosis Date    Abdominal pain     Abnormal CAT scan     Anxiety     Anxiety     Blood in stool     Candidiasis of skin     Chronic diarrhea     Chronic fatigue     Colitis     Community acquired bacterial pneumonia     Depression     Dyspepsia     GERD (gastroesophageal reflux disease)     Hemorrhoids     Herpes labialis     cold sores     IBS (irritable bowel syndrome)     Left ovarian cyst     Mild episode of recurrent major depressive disorder ([facility] [address] ) 12/19/2018    Morbid obesity (HCC)     Nausea & vomiting     Restless leg     Sleep pattern disturbance     Strain of thoracic spine, initial encounter      Past Surgical History:   Procedure Laterality Date    COLONOSCOPY  12/05/2017    EGD AND COLONOSCOPY N/A 12/5/2017    Procedure: EGD AND COLONOSCOPY;  Surgeon: Isidro Gongora MD;  Location: [facility];   Service: Gastroenterology    GASTRECTOMY SLEEVE LAPAROSCOPIC  2019    UPPER GASTROINTESTINAL ENDOSCOPY  12/05/2017    WISDOM TOOTH EXTRACTION      WRIST SURGERY       Current Outpatient Medications   Medication Sig Dispense Refill    buPROPion (Wellbutrin XL) 150 mg 24 hr tablet Take 1 tablet (150 mg total) by mouth daily 7 tablet 0    [START ON 1/25/2022] buPROPion (Wellbutrin XL) 300 mg 24 hr tablet Take 1 tablet (300 mg total) by mouth daily 30 tablet 1    cholecalciferol (VITAMIN D3) 1,000 units tablet Take 1 capsule by mouth      cyclobenzaprine (FLEXERIL) 10 mg tablet Take 1 tablet (10 mg total) by mouth 3 (three) times a day as needed for muscle spasms 15 tablet 0    desogestrel-ethinyl estradiol (KARIVA) 0 15-0 02/0 01 MG (21/5) per tablet Take 1 tablet by mouth daily 30 tablet 3    diazepam (VALIUM) 5 mg tablet Take 1 tablet (5 mg total) by mouth every 8 (eight) hours as needed for anxiety 30 tablet 1    fexofenadine (ALLEGRA) 60 MG tablet one by mouth daily prn      FLUoxetine (PROzac) 20 MG tablet Take 1 tablet (20 mg total) by mouth daily For 2 weeks and then increase to 20mg for 2 weeks then increase to 30mg for 2 weeks then 40mg thereafter 30 tablet 2    hyoscyamine (ANASPAZ,LEVSIN) 0 125 MG tablet Take 1 tablet (0 125 mg total) by mouth every 4 (four) hours as needed for cramping 30 tablet 0    Multiple Vitamin (MULTIVITAMIN) tablet Take 1 tablet by mouth daily      omeprazole (PriLOSEC) 40 MG capsule take 1 capsule by mouth once daily 90 capsule 0    ondansetron (ZOFRAN-ODT) 4 mg disintegrating tablet Take 1 tablet (4 mg total) by mouth every 6 (six) hours as needed for nausea 20 tablet 0    traZODone (DESYREL) 150 mg tablet Take 0 5 tablets (75 mg total) by mouth daily at bedtime as needed for sleep 30 tablet 1     No current facility-administered medications for this visit  Allergies   Allergen Reactions    Other Other (See Comments)     Seasonal, dander, molds- sneezing, ear infections       Review of Systems   Constitutional: Positive for fatigue  Negative for chills and fever  HENT: Positive for congestion and sore throat  Negative for rhinorrhea  Respiratory: Positive for cough  Negative for chest tightness and shortness of breath  Gastrointestinal: Negative for abdominal pain, diarrhea, nausea and vomiting  Neurological: Positive for headaches  Objective:    Vitals:    01/20/22 1043   Temp: 97 9 °F (36 6 °C)   Weight: 56 2 kg (124 lb)   Height: 5' (1 524 m)       Physical Exam  It was my intent to perform this visit via video technology but the patient was not able to do a video connection so the visit was completed via audio telephone only  VIRTUAL VISIT DISCLAIMER    Anel Jimenez verbally agrees to participate in [employer]  Pt is aware that [employer] could be limited without vital signs or the ability to perform a full hands-on physical Devin Ross understands she or the provider may request at any time to terminate the video visit and request the patient to seek care or treatment in person

## 2022-02-04 ENCOUNTER — TELEMEDICINE (OUTPATIENT)
Dept: FAMILY MEDICINE CLINIC | Facility: CLINIC | Age: 32
End: 2022-02-04
Payer: COMMERCIAL

## 2022-02-04 VITALS — WEIGHT: 123 LBS | BODY MASS INDEX: 24.15 KG/M2 | TEMPERATURE: 97.9 F | HEIGHT: 60 IN

## 2022-02-04 DIAGNOSIS — Z20.822 COUGH WITH EXPOSURE TO COVID-19 VIRUS: ICD-10-CM

## 2022-02-04 DIAGNOSIS — R05.8 COUGH WITH EXPOSURE TO COVID-19 VIRUS: ICD-10-CM

## 2022-02-04 DIAGNOSIS — U07.1 COVID-19: Primary | ICD-10-CM

## 2022-02-04 PROCEDURE — 1036F TOBACCO NON-USER: CPT | Performed by: INTERNAL MEDICINE

## 2022-02-04 PROCEDURE — 99213 OFFICE O/P EST LOW 20 MIN: CPT | Performed by: INTERNAL MEDICINE

## 2022-02-04 PROCEDURE — 3008F BODY MASS INDEX DOCD: CPT | Performed by: INTERNAL MEDICINE

## 2022-02-04 RX ORDER — GUAIFENESIN AND CODEINE PHOSPHATE 100; 10 MG/5ML; MG/5ML
5 SOLUTION ORAL 3 TIMES DAILY PRN
Qty: 236 ML | Refills: 0 | Status: SHIPPED | OUTPATIENT
Start: 2022-02-04

## 2022-02-04 RX ORDER — BENZONATATE 200 MG/1
200 CAPSULE ORAL 3 TIMES DAILY PRN
Qty: 20 CAPSULE | Refills: 0 | Status: SHIPPED | OUTPATIENT
Start: 2022-02-04 | End: 2022-04-05

## 2022-02-04 NOTE — PROGRESS NOTES
COVID-19 Outpatient Progress Note    Assessment/Plan:    Problem List Items Addressed This Visit     None      Visit Diagnoses     COVID-19    -  Primary    Relevant Medications    benzonatate (TESSALON) 200 MG capsule    Cough with exposure to COVID-19 virus        Relevant Medications    guaifenesin-codeine (GUAIFENESIN AC) 100-10 MG/5ML liquid       patient will return to work 2/15 if patient continues to make progress afebrile and no or minimal cough  Mother 15 minutes counseling coordination of care    Encounter provider Fernande Rinne, MD    Provider located at [facility]  [address], [place] [address] Jennifer [place] , [address]  [facility] [place] [phone]    Recent Visits  No visits were found meeting these conditions  Showing recent visits within past 7 days and meeting all other requirements  Today's Visits  Date Type Provider Dept   02/04/22 Telemedicine Fernande Rinne, MD [address] today's visits and meeting all other requirements  Future Appointments  No visits were found meeting these conditions  Showing future appointments within next 150 days and meeting all other requirements     This virtual check-in was done via telephone and she agrees to proceed  Patient agrees to participate in a virtual check in via telephone or video visit instead of presenting to the office to address urgent/immediate medical needs  Patient is aware this is a billable service  After connecting through Telephone, the patient was identified by name and date of birth  Susan Wallis was informed that this was a telemedicine visit and that the exam was being conducted confidentially over secure lines  My office door was closed  No one else was in the room  Susan Wallis acknowledged consent and understanding of privacy and security of the telemedicine visit   I informed the patient that I have reviewed her record in Epic and presented the opportunity for her to ask any questions regarding the visit today  The patient agreed to participate  It was my intent to perform this visit via video technology but the patient was not able to do a video connection so the visit was completed via audio telephone only  Verification of patient location:  Patient is located in the following state in which I hold an active license: [place]    Subjective:   Stevan Finney is a 32 y o  female who is concerned about COVID-19  Patient's symptoms include chills, nasal congestion, sore throat and cough  Patient denies fever, fatigue, malaise, rhinorrhea, anosmia, loss of taste, shortness of breath, chest tightness, abdominal pain, nausea, vomiting, diarrhea, myalgias and headaches       - Date of symptom onset: 2/3/2022      COVID-19 vaccination status: Fully vaccinated (primary series)    Exposure:   Contact with a person who is under investigation (PUI) for or who is positive for COVID-19 within the last 14 days?: Yes    Lab Results   Component Value Date    SARSCOV2 Negative 08/10/2021    SARSCOV2 Not Detected 11/11/2020    [facility] Not Detected 01/20/2022     Past Medical History:   Diagnosis Date    Abdominal pain     Abnormal CAT scan     Anxiety     Anxiety     Blood in stool     Candidiasis of skin     Chronic diarrhea     Chronic fatigue     Colitis     Community acquired bacterial pneumonia     Depression     Dyspepsia     GERD (gastroesophageal reflux disease)     Hemorrhoids     Herpes labialis     cold sores     IBS (irritable bowel syndrome)     Left ovarian cyst     Mild episode of recurrent major depressive disorder ([facility] [address] ) 12/19/2018    Morbid obesity (HCC)     Nausea & vomiting     Restless leg     Sleep pattern disturbance     Strain of thoracic spine, initial encounter      Past Surgical History:   Procedure Laterality Date    COLONOSCOPY  12/05/2017    EGD AND COLONOSCOPY N/A 12/5/2017    Procedure: EGD AND COLONOSCOPY;  Surgeon: Sylwia Valdez MD;  Location: [facility];   Service: Gastroenterology    GASTRECTOMY SLEEVE LAPAROSCOPIC  2019    UPPER GASTROINTESTINAL ENDOSCOPY  12/05/2017    WISDOM TOOTH EXTRACTION      WRIST SURGERY       Current Outpatient Medications   Medication Sig Dispense Refill    buPROPion (Wellbutrin XL) 300 mg 24 hr tablet Take 1 tablet (300 mg total) by mouth daily 30 tablet 1    cholecalciferol (VITAMIN D3) 1,000 units tablet Take 1 capsule by mouth      cyclobenzaprine (FLEXERIL) 10 mg tablet Take 1 tablet (10 mg total) by mouth 3 (three) times a day as needed for muscle spasms 15 tablet 0    desogestrel-ethinyl estradiol (KARIVA) 0 15-0 02/0 01 MG (21/5) per tablet Take 1 tablet by mouth daily 30 tablet 3    diazepam (VALIUM) 5 mg tablet Take 1 tablet (5 mg total) by mouth every 8 (eight) hours as needed for anxiety 30 tablet 1    fexofenadine (ALLEGRA) 60 MG tablet one by mouth daily prn      FLUoxetine (PROzac) 20 MG tablet Take 1 tablet (20 mg total) by mouth daily For 2 weeks and then increase to 20mg for 2 weeks then increase to 30mg for 2 weeks then 40mg thereafter 30 tablet 2    hyoscyamine (ANASPAZ,LEVSIN) 0 125 MG tablet Take 1 tablet (0 125 mg total) by mouth every 4 (four) hours as needed for cramping 30 tablet 0    Multiple Vitamin (MULTIVITAMIN) tablet Take 1 tablet by mouth daily      omeprazole (PriLOSEC) 40 MG capsule take 1 capsule by mouth once daily 90 capsule 0    ondansetron (ZOFRAN-ODT) 4 mg disintegrating tablet Take 1 tablet (4 mg total) by mouth every 6 (six) hours as needed for nausea 20 tablet 0    traZODone (DESYREL) 150 mg tablet Take 0 5 tablets (75 mg total) by mouth daily at bedtime as needed for sleep 30 tablet 1    benzonatate (TESSALON) 200 MG capsule Take 1 capsule (200 mg total) by mouth 3 (three) times a day as needed for cough 20 capsule 0    buPROPion (Wellbutrin XL) 150 mg 24 hr tablet Take 1 tablet (150 mg total) by mouth daily (Patient not taking: Reported on 2/4/2022 ) 7 tablet 0    guaifenesin-codeine (GUAIFENESIN AC) 100-10 MG/5ML liquid Take 5 mL by mouth 3 (three) times a day as needed for cough 236 mL 0     No current facility-administered medications for this visit  Allergies   Allergen Reactions    Other Other (See Comments)     Seasonal, dander, molds- sneezing, ear infections       Review of Systems   Constitutional: Positive for chills and diaphoresis  Negative for fatigue and fever  HENT: Positive for congestion, ear pain and sore throat  Negative for rhinorrhea  Respiratory: Positive for cough  Negative for chest tightness and shortness of breath  Gastrointestinal: Negative for abdominal pain, diarrhea, nausea and vomiting  Musculoskeletal: Negative for myalgias  Neurological: Negative for headaches  Objective:    Vitals:    02/04/22 0807   Temp: 97 9 °F (36 6 °C)   Weight: 55 8 kg (123 lb)   Height: 5' (1 524 m)    95%    Physical Exam patient is heard coughing but able to finish sentences  It was my intent to perform this visit via video technology but the patient was not able to do a video connection so the visit was completed via audio telephone only  VIRTUAL VISIT DISCLAIMER    Shara Fatima verbally agrees to participate in [employer]  Pt is aware that [employer] could be limited without vital signs or the ability to perform a full hands-on physical Shaylee Marcus understands she or the provider may request at any time to terminate the video visit and request the patient to seek care or treatment in person

## 2022-02-17 ENCOUNTER — OFFICE VISIT (OUTPATIENT)
Dept: URGENT CARE | Age: 32
End: 2022-02-17
Payer: COMMERCIAL

## 2022-02-17 VITALS
WEIGHT: 121.6 LBS | TEMPERATURE: 96.6 F | RESPIRATION RATE: 20 BRPM | HEART RATE: 81 BPM | BODY MASS INDEX: 23.87 KG/M2 | SYSTOLIC BLOOD PRESSURE: 115 MMHG | HEIGHT: 60 IN | DIASTOLIC BLOOD PRESSURE: 63 MMHG | OXYGEN SATURATION: 99 %

## 2022-02-17 DIAGNOSIS — L50.9 URTICARIAL RASH: Primary | ICD-10-CM

## 2022-02-17 PROCEDURE — 99213 OFFICE O/P EST LOW 20 MIN: CPT | Performed by: PHYSICIAN ASSISTANT

## 2022-02-17 RX ORDER — METHYLPREDNISOLONE 4 MG/1
TABLET ORAL
Qty: 1 EACH | Refills: 0 | Status: SHIPPED | OUTPATIENT
Start: 2022-02-17 | End: 2022-04-05

## 2022-02-17 NOTE — LETTER
February 17, 2022     Patient: Eladio Murdock   YOB: 1990   Date of Visit: 2/17/2022       To Whom It May Concern: It is my medical opinion that Eladio Mathieu may return to full duty immediately with no restrictions  If you have any questions or concerns, please don't hesitate to call           Sincerely,        Lenka Rain PA-C    CC: No Recipients

## 2022-02-17 NOTE — PATIENT INSTRUCTIONS
Take Medrol Dosepak as directed  Continue oral antihistamines  Continue hydrocortisone as needed do not apply to face and do not use for more than 2 weeks  If symptoms are not improved in 1-2 weeks follow up with primary care doctor or Dermatology  Dermatology referral provided  If symptoms worsen or new symptoms such as shortness of breath difficulty swallowing or swelling of the lips occur reports the emergency room  Urticaria   AMBULATORY CARE:   Urticaria  is also called hives  Hives can change size and shape, and appear anywhere on your skin  They can be mild or severe and last from a few minutes to a few days  Hives may be a sign of a severe allergic reaction called anaphylaxis that needs immediate treatment  Urticaria that lasts longer than 6 weeks may be a chronic condition that needs long-term treatment  Call your local emergency number (911 in the [address] [place] [address],[address]) for signs or symptoms of anaphylaxis,  such as trouble breathing, swelling in your mouth or throat, or wheezing  You may also have itching, a rash, or feel like you are going to faint  Seek care immediately if:   · Your heart is beating faster than it normally does  · You have cramping or severe pain in your abdomen  Call your doctor if:   · You have a fever  · Your skin still itches 24 hours after you take your medicine  · You still have hives after 7 days  · Your joints are painful and swollen  · You have questions or concerns about your condition or care  Steps to take for signs or symptoms of anaphylaxis:   · Immediately  give 1 shot of epinephrine only into the outer thigh muscle  · Leave the shot in place  as directed  Your healthcare provider may recommend you leave it in place for up to 10 seconds before you remove it  This helps make sure all of the epinephrine is delivered  · Call 911 and go to the emergency department,  even if the shot improved symptoms  Do not drive yourself   Bring the used epinephrine shot with you  Treatment for mild urticaria  may not be needed  Chronic urticaria may need to be treated with more than one medicine, or other medicines than listed below  The following are common medicines used to treat urticaria:  · Antihistamines  decrease mild symptoms such as itching or a rash  · Steroids  decrease redness, pain, and swelling  · Epinephrine  is used to treat severe allergic reactions such as anaphylaxis  Safety precautions to take if you are at risk for anaphylaxis:   · Keep 2 shots of epinephrine with you at all times  You may need a second shot, because epinephrine only works for about 20 minutes and symptoms may return  Your healthcare provider can show you and family members how to give the shot  Check the expiration date every month and replace it before it expires  · Create an action plan  Your healthcare provider can help you create a written plan that explains the allergy and an emergency plan to treat a reaction  The plan explains when to give a second epinephrine shot if symptoms return or do not improve after the first  Give copies of the action plan and emergency instructions to family members, work and school staff, and  providers  Show them how to give a shot of epinephrine  · Be careful when you exercise  If you have had exercise-induced anaphylaxis, do not exercise right after you eat  Stop exercising right away if you start to develop any signs or symptoms of anaphylaxis  You may first feel tired, warm, or have itchy skin  Hives, swelling, and severe breathing problems may develop if you continue to exercise  · Carry medical alert identification  Wear medical alert jewelry or carry a card that explains the allergy  Ask your healthcare provider where to get these items  · Keep a record of triggers and symptoms  Record everything you eat, drink, or apply to your skin for 3 weeks   Include stressful events and what you were doing right before your hives started  Bring the record with you to follow-up visits with your healthcare provider  Manage urticaria:   · Cool your skin  This may help decrease itching  Apply a cool pack to your hives  Dip a hand towel in cool water, wring it out, and place it on your hives  You may also soak your skin in a cool oatmeal bath  · Do not rub your hives  This can irritate your skin and cause more hives  · Wear loose clothing  Tight clothes may irritate your skin and cause more hives  · Manage stress  Stress may trigger hives, or make them worse  Learn new ways to relax, such as deep breathing  Follow up with your doctor as directed:  Write down your questions so you remember to ask them during your visits  © Copyright [employer] 2021 Information is for End User's use only and may not be sold, redistributed or otherwise used for commercial purposes  All illustrations and images included in CareNotes® are the copyrighted property of [employer] M [employer]  or [facility] Meeta Silvestre  The above information is an  only  It is not intended as medical advice for individual conditions or treatments  Talk to your doctor, nurse or pharmacist before following any medical regimen to see if it is safe and effective for you

## 2022-02-17 NOTE — PROGRESS NOTES
[facility]        NAME: Morales Bailey is a 32 y o  female  : 1990    MRN: [id number]  DATE: 2022  TIME: 12:21 PM    Assessment and Plan   Urticarial rash [L50 9]  1  Urticarial rash  methylPREDNISolone 4 MG tablet therapy pack    Ambulatory Referral to Dermatology   Pt presents with urticarial rash consistent with some type of allergic reaction  We discussed Solumedrol in the office vs Medrol Dosepak including all side effects  She has opted for Medrol Dosepak  She will continue oral antihistamines and cortisone cream  If her symptoms do not improve in 1-2 weeks or recur, she will follow with her PCP or dermatology  Dermatology referral provided today  We discussed symptoms of developing anaphylaxis and worsening allergic reaction and when to report to the emergency department  Patient Instructions     Patient Instructions     Take Medrol Dosepak as directed  Continue oral antihistamines  Continue hydrocortisone as needed do not apply to face and do not use for more than 2 weeks  If symptoms are not improved in 1-2 weeks follow up with primary care doctor or Dermatology  Dermatology referral provided  If symptoms worsen or new symptoms such as shortness of breath difficulty swallowing or swelling of the lips occur reports the emergency room  Urticaria   AMBULATORY CARE:   Urticaria  is also called hives  Hives can change size and shape, and appear anywhere on your skin  They can be mild or severe and last from a few minutes to a few days  Hives may be a sign of a severe allergic reaction called anaphylaxis that needs immediate treatment  Urticaria that lasts longer than 6 weeks may be a chronic condition that needs long-term treatment  Call your local emergency number (911 in the [address] [place] [address],[address]) for signs or symptoms of anaphylaxis,  such as trouble breathing, swelling in your mouth or throat, or wheezing   You may also have itching, a rash, or feel like you are going to faint   Seek care immediately if:   · Your heart is beating faster than it normally does  · You have cramping or severe pain in your abdomen  Call your doctor if:   · You have a fever  · Your skin still itches 24 hours after you take your medicine  · You still have hives after 7 days  · Your joints are painful and swollen  · You have questions or concerns about your condition or care  Steps to take for signs or symptoms of anaphylaxis:   · Immediately  give 1 shot of epinephrine only into the outer thigh muscle  · Leave the shot in place  as directed  Your healthcare provider may recommend you leave it in place for up to 10 seconds before you remove it  This helps make sure all of the epinephrine is delivered  · Call 911 and go to the emergency department,  even if the shot improved symptoms  Do not drive yourself  Bring the used epinephrine shot with you  Treatment for mild urticaria  may not be needed  Chronic urticaria may need to be treated with more than one medicine, or other medicines than listed below  The following are common medicines used to treat urticaria:  · Antihistamines  decrease mild symptoms such as itching or a rash  · Steroids  decrease redness, pain, and swelling  · Epinephrine  is used to treat severe allergic reactions such as anaphylaxis  Safety precautions to take if you are at risk for anaphylaxis:   · Keep 2 shots of epinephrine with you at all times  You may need a second shot, because epinephrine only works for about 20 minutes and symptoms may return  Your healthcare provider can show you and family members how to give the shot  Check the expiration date every month and replace it before it expires  · Create an action plan  Your healthcare provider can help you create a written plan that explains the allergy and an emergency plan to treat a reaction   The plan explains when to give a second epinephrine shot if symptoms return or do not improve after the first  Give copies of the action plan and emergency instructions to family members, work and school staff, and  providers  Show them how to give a shot of epinephrine  · Be careful when you exercise  If you have had exercise-induced anaphylaxis, do not exercise right after you eat  Stop exercising right away if you start to develop any signs or symptoms of anaphylaxis  You may first feel tired, warm, or have itchy skin  Hives, swelling, and severe breathing problems may develop if you continue to exercise  · Carry medical alert identification  Wear medical alert jewelry or carry a card that explains the allergy  Ask your healthcare provider where to get these items  · Keep a record of triggers and symptoms  Record everything you eat, drink, or apply to your skin for 3 weeks  Include stressful events and what you were doing right before your hives started  Bring the record with you to follow-up visits with your healthcare provider  Manage urticaria:   · Cool your skin  This may help decrease itching  Apply a cool pack to your hives  Dip a hand towel in cool water, wring it out, and place it on your hives  You may also soak your skin in a cool oatmeal bath  · Do not rub your hives  This can irritate your skin and cause more hives  · Wear loose clothing  Tight clothes may irritate your skin and cause more hives  · Manage stress  Stress may trigger hives, or make them worse  Learn new ways to relax, such as deep breathing  Follow up with your doctor as directed:  Write down your questions so you remember to ask them during your visits  © Copyright [employer] 2021 Information is for End User's use only and may not be sold, redistributed or otherwise used for commercial purposes  All illustrations and images included in CareNotes® are the copyrighted property of [employer]  or Hilario Martinez   The above information is an  only   It is not intended as medical advice for individual conditions or treatments  Talk to your doctor, nurse or pharmacist before following any medical regimen to see if it is safe and effective for you  Follow up with PCP in 3-5 days  Proceed to  ER if symptoms worsen  Chief Complaint     Chief Complaint   Patient presents with    Rash     Pt started on Tues with rash on her legs which progresssed to arms, chest and face  States lips were swollen last ashlee  Took Benadryl in ashlee  Had Covid on 2/4, returned to work on Monday  History of Present Illness       32year old female presents with complaint of rash  Pt reports that the rash started on her legs on Tuesday and then progressed to her torso, arms, neck and face  The rash is very itchy Pt reports yesterday evening her symptoms were the worst  Her lips were also chapped and swollen last night, but improved with use of Neosporin lipbalm  She has tried Benadryl and hydrocortisone with mild benefit  Pt denies changes in detergents, soap, shampoos, conditioners, lotions, oils, and diet  She did transition from Mirena to OCP's about 2 months ago, but otherwise has not had any medication changes  She was diagnosed with COVID-19 on 02/04/2022  No other concerns or complaints today  Review of Systems   Review of Systems   Constitutional: Negative for chills and fever  HENT: Negative for sore throat and trouble swallowing  Respiratory: Negative for cough and shortness of breath  Cardiovascular: Negative for chest pain  Skin: Positive for rash           Current Medications       Current Outpatient Medications:     buPROPion (Wellbutrin XL) 300 mg 24 hr tablet, Take 1 tablet (300 mg total) by mouth daily, Disp: 30 tablet, Rfl: 1    cholecalciferol (VITAMIN D3) 1,000 units tablet, Take 1 capsule by mouth, Disp: , Rfl:     cyclobenzaprine (FLEXERIL) 10 mg tablet, Take 1 tablet (10 mg total) by mouth 3 (three) times a day as needed for muscle spasms, Disp: 15 tablet, Rfl: 0    desogestrel-ethinyl estradiol (KARIVA) 0 15-0 02/0 01 MG (21/5) per tablet, Take 1 tablet by mouth daily, Disp: 30 tablet, Rfl: 3    diazepam (VALIUM) 5 mg tablet, Take 1 tablet (5 mg total) by mouth every 8 (eight) hours as needed for anxiety, Disp: 30 tablet, Rfl: 1    FLUoxetine (PROzac) 20 MG tablet, Take 1 tablet (20 mg total) by mouth daily For 2 weeks and then increase to 20mg for 2 weeks then increase to 30mg for 2 weeks then 40mg thereafter, Disp: 30 tablet, Rfl: 2    hyoscyamine (ANASPAZ,LEVSIN) 0 125 MG tablet, Take 1 tablet (0 125 mg total) by mouth every 4 (four) hours as needed for cramping, Disp: 30 tablet, Rfl: 0    Multiple Vitamin (MULTIVITAMIN) tablet, Take 1 tablet by mouth daily, Disp: , Rfl:     omeprazole (PriLOSEC) 40 MG capsule, take 1 capsule by mouth once daily, Disp: 90 capsule, Rfl: 0    traZODone (DESYREL) 150 mg tablet, Take 0 5 tablets (75 mg total) by mouth daily at bedtime as needed for sleep, Disp: 30 tablet, Rfl: 1    benzonatate (TESSALON) 200 MG capsule, Take 1 capsule (200 mg total) by mouth 3 (three) times a day as needed for cough (Patient not taking: Reported on 2/17/2022 ), Disp: 20 capsule, Rfl: 0    buPROPion (Wellbutrin XL) 150 mg 24 hr tablet, Take 1 tablet (150 mg total) by mouth daily (Patient not taking: Reported on 2/4/2022 ), Disp: 7 tablet, Rfl: 0    fexofenadine (ALLEGRA) 60 MG tablet, one by mouth daily prn (Patient not taking: Reported on 2/17/2022), Disp: , Rfl:     guaifenesin-codeine (GUAIFENESIN AC) 100-10 MG/5ML liquid, Take 5 mL by mouth 3 (three) times a day as needed for cough (Patient not taking: Reported on 2/17/2022 ), Disp: 236 mL, Rfl: 0    methylPREDNISolone 4 MG tablet therapy pack, Use as directed on package, Disp: 1 each, Rfl: 0    ondansetron (ZOFRAN-ODT) 4 mg disintegrating tablet, Take 1 tablet (4 mg total) by mouth every 6 (six) hours as needed for nausea (Patient not taking: Reported on 2/17/2022 ), Disp: 20 tablet, Rfl: 0    Current Allergies     Allergies as of 02/17/2022 - Reviewed 02/17/2022   Allergen Reaction Noted    Other Other (See Comments) 12/12/2016            The following portions of the patient's history were reviewed and updated as appropriate: allergies, current medications, past family history, past medical history, past social history, past surgical history and problem list      Past Medical History:   Diagnosis Date    Abdominal pain     Abnormal CAT scan     Anxiety     Anxiety     Blood in stool     Candidiasis of skin     Chronic diarrhea     Chronic fatigue     Colitis     Community acquired bacterial pneumonia     Depression     Dyspepsia     GERD (gastroesophageal reflux disease)     Hemorrhoids     Herpes labialis     cold sores     IBS (irritable bowel syndrome)     Left ovarian cyst     Mild episode of recurrent major depressive disorder ([facility] [address] ) 12/19/2018    Morbid obesity ([facility] [address] )     Nausea & vomiting     Restless leg     Sleep pattern disturbance     Strain of thoracic spine, initial encounter        Past Surgical History:   Procedure Laterality Date    COLONOSCOPY  12/05/2017    EGD AND COLONOSCOPY N/A 12/5/2017    Procedure: EGD AND COLONOSCOPY;  Surgeon: Yi Lima MD;  Location: [facility];   Service: Gastroenterology    GASTRECTOMY SLEEVE LAPAROSCOPIC  2019    UPPER GASTROINTESTINAL ENDOSCOPY  12/05/2017    WISDOM TOOTH EXTRACTION      WRIST SURGERY         Family History   Problem Relation Age of Onset    Melanoma Mother     No Known Problems Father     Other Maternal Grandmother         colitis    Diabetes Maternal Grandmother     Hypertension Maternal Grandmother     No Known Problems Sister     Stroke Paternal Grandfather     Heart disease Maternal Grandfather     Heart attack Maternal Grandfather     Arthritis Paternal Grandmother     Hypertension Paternal Grandmother     Diabetes Paternal Grandmother     Mental illness Paternal Grandmother     Substance Abuse Paternal Aunt     Other Paternal Aunt     Thyroid disease Neg Hx          Medications have been verified  Objective   /63   Pulse 81   Temp (!) 96 6 °F (35 9 °C)   Resp 20   Ht 5' (1 524 m)   Wt 55 2 kg (121 lb 9 6 oz)   LMP 02/17/2022   SpO2 99%   BMI 23 75 kg/m²   Patient's last menstrual period was 02/17/2022  Physical Exam     Physical Exam  Constitutional:       General: She is awake  She is not in acute distress  Appearance: Normal appearance  She is well-developed and well-groomed  She is not ill-appearing, toxic-appearing or diaphoretic  HENT:      Head: Normocephalic and atraumatic  Right Ear: Hearing and external ear normal       Left Ear: Hearing and external ear normal    Eyes:      General: Lids are normal  Vision grossly intact  Gaze aligned appropriately  Cardiovascular:      Rate and Rhythm: Normal rate  Pulmonary:      Effort: Pulmonary effort is normal       Comments: Patient is speaking in full sentences with no increased respiratory effort  No audible wheezing or stridor  Musculoskeletal:      Cervical back: Normal range of motion  Skin:     General: Skin is warm and dry  Findings: Rash present  Rash is urticarial       Nails: There is no clubbing  Comments: Pt presents with urticarial rash over torso, arms, neck and legs  Rash to the legs is most noticeable  Neurological:      Mental Status: She is alert and oriented to person, place, and time  Coordination: Coordination is intact  Gait: Gait is intact  Psychiatric:         Attention and Perception: Attention and perception normal          Mood and Affect: Mood and affect normal          Speech: Speech normal          Behavior: Behavior normal  Behavior is cooperative  Note: Portions of this record may have been created with voice recognition software   Occasional wrong word or "sound a like" substitutions may have occurred due to the inherent limitations of voice recognition software  Please read the chart carefully and recognize, using context, where substitutions have occurred  *

## 2022-03-16 DIAGNOSIS — F33.0 MAJOR DEPRESSIVE DISORDER, RECURRENT, MILD (HCC): ICD-10-CM

## 2022-03-16 DIAGNOSIS — F90.2 ATTENTION DEFICIT HYPERACTIVITY DISORDER (ADHD), COMBINED TYPE: ICD-10-CM

## 2022-03-16 RX ORDER — BUPROPION HYDROCHLORIDE 300 MG/1
300 TABLET ORAL DAILY
Qty: 90 TABLET | Refills: 1 | Status: SHIPPED | OUTPATIENT
Start: 2022-03-16

## 2022-03-23 ENCOUNTER — TELEPHONE (OUTPATIENT)
Dept: PSYCHIATRY | Facility: CLINIC | Age: 32
End: 2022-03-23

## 2022-03-23 ENCOUNTER — TELEMEDICINE (OUTPATIENT)
Dept: PSYCHIATRY | Facility: CLINIC | Age: 32
End: 2022-03-23
Payer: COMMERCIAL

## 2022-03-23 DIAGNOSIS — G47.00 INSOMNIA, UNSPECIFIED TYPE: ICD-10-CM

## 2022-03-23 DIAGNOSIS — Z56.6 STRESS AT WORK: ICD-10-CM

## 2022-03-23 DIAGNOSIS — F33.0 MAJOR DEPRESSIVE DISORDER, RECURRENT, MILD (HCC): Primary | ICD-10-CM

## 2022-03-23 DIAGNOSIS — F33.8 SEASONAL AFFECTIVE DISORDER (HCC): ICD-10-CM

## 2022-03-23 DIAGNOSIS — F90.2 ATTENTION DEFICIT HYPERACTIVITY DISORDER (ADHD), COMBINED TYPE: ICD-10-CM

## 2022-03-23 PROCEDURE — 1036F TOBACCO NON-USER: CPT | Performed by: PSYCHIATRY & NEUROLOGY

## 2022-03-23 PROCEDURE — 99214 OFFICE O/P EST MOD 30 MIN: CPT | Performed by: PSYCHIATRY & NEUROLOGY

## 2022-03-23 PROCEDURE — 90833 PSYTX W PT W E/M 30 MIN: CPT | Performed by: PSYCHIATRY & NEUROLOGY

## 2022-03-23 RX ORDER — FLUOXETINE HYDROCHLORIDE 40 MG/1
40 CAPSULE ORAL DAILY
Qty: 90 CAPSULE | Refills: 2 | Status: SHIPPED | OUTPATIENT
Start: 2022-03-23

## 2022-03-23 RX ORDER — TRAZODONE HYDROCHLORIDE 150 MG/1
75 TABLET ORAL
Qty: 135 TABLET | Refills: 1 | Status: SHIPPED | OUTPATIENT
Start: 2022-03-23

## 2022-03-23 SDOH — HEALTH STABILITY - MENTAL HEALTH: OTHER PHYSICAL AND MENTAL STRAIN RELATED TO WORK: Z56.6

## 2022-03-23 NOTE — PATIENT INSTRUCTIONS
Major depressive disorder, recurrent, mild (HCC)  -     FLUoxetine (PROzac) 40 MG capsule; Take 1 capsule (40 mg total) by mouth daily  -     traZODone (DESYREL) 150 mg tablet; Take 0 5 tablets (75 mg total) by mouth daily at bedtime as needed for sleep    Attention deficit hyperactivity disorder (ADHD), combined type  -     FLUoxetine (PROzac) 40 MG capsule; Take 1 capsule (40 mg total) by mouth daily    Seasonal affective disorder (HCC)  -     FLUoxetine (PROzac) 40 MG capsule; Take 1 capsule (40 mg total) by mouth daily    Insomnia, unspecified type  -     FLUoxetine (PROzac) 40 MG capsule; Take 1 capsule (40 mg total) by mouth daily    Stress at work  -     FLUoxetine (PROzac) 40 MG capsule;  Take 1 capsule (40 mg total) by mouth daily      continue wellbutrin 300mg daily  Follow up with sulma ward in 2 months

## 2022-03-23 NOTE — BH TREATMENT PLAN
TREATMENT PLAN (Medication Management Only)        [facility] [employer]    Name and Date of Birth:  Eladio Murdock 32 y o  1990  Date of Treatment Plan: March 23, 2022  Diagnosis/Diagnoses:    1  Major depressive disorder, recurrent, mild ([facility] [address] )    2  Attention deficit hyperactivity disorder (ADHD), combined type    3  [facility]    4  Insomnia, unspecified type    5  Stress at work      Strengths/Personal Resources for Self-Care: "I know when to care for myself  helping cleaning out  my aunts apt  still paying bills, eating, and taking care of the house  "  Area/Areas of need (in own words): "advocating for self more"  1  Long Term Goal: to either be in a better place with her current job or a new job  Target Date:6 months - 9/23/2022  Person/Persons responsible for completion of goal: Dr Nicolasa Strickland  2  Short Term Objective (s) - How will we reach this goal?:   A  Provider new recommended medication/dosage changes and/or continue medication(s): Medication changes: I have discontinued Michael Noguera's FLUoxetine  I am also having her start on FLUoxetine  Additionally, I am having her maintain her fexofenadine, multivitamin, cholecalciferol, hyoscyamine, cyclobenzaprine, desogestrel-ethinyl estradiol, diazepam, ondansetron, omeprazole, benzonatate, guaifenesin-codeine, methylPREDNISolone, buPROPion, and traZODone     B  N/A   C  N/A  Target Date:6 months - 9/23/2022  Person/Persons Responsible for Completion of Goal: Dr Nicolasa Be  Progress Towards Goals: continuing treatment  Treatment Modality: medication management every 2 months  Review due 180 days from date of this plan: 6 months - 9/23/2022  Expected length of service: ongoing treatment  My Physician/PA/NP and I have developed this plan together and I agree to work on the goals and objectives   I understand the treatment goals that were developed for my treatment    Treatment Plan done but not signed at time of office visit due to:  Plan reviewed by phone or in person  and verbal consent given due to [place] social distancing

## 2022-03-23 NOTE — PSYCH
Virtual Regular Visit    Verification of patient location:    Patient is located in the following state in which I hold an active license [place]      Assessment/Plan:    Problem List Items Addressed This Visit        Other    Insomnia    Relevant Medications    FLUoxetine (PROzac) 40 MG capsule    ADHD    Relevant Medications    FLUoxetine (PROzac) 40 MG capsule    traZODone (DESYREL) 150 mg tablet    Major depressive disorder, recurrent, mild (HCC) - Primary    Relevant Medications    FLUoxetine (PROzac) 40 MG capsule    traZODone (DESYREL) 150 mg tablet    Stress at work    Relevant Medications    FLUoxetine (PROzac) 40 MG capsule      Other Visit Diagnoses     Seasonal affective disorder (HCC)        Relevant Medications    FLUoxetine (PROzac) 40 MG capsule    traZODone (DESYREL) 150 mg tablet                 Reason for visit is   Chief Complaint   Patient presents with    Medication Management    Virtual Regular Visit        Encounter provider Inga Ignacio MD    Provider located at [address] [place] [id number] [phone]      Recent Visits  No visits were found meeting these conditions  Showing recent visits within past 7 days and meeting all other requirements  Today's Visits  Date Type Provider Dept   03/23/22 [address], MD [address] today's visits and meeting all other requirements  Future Appointments  No visits were found meeting these conditions  Showing future appointments within next 150 days and meeting all other requirements       The patient was identified by name and date of birth  Anel Jimenez was informed that this is a telemedicine visit and [facility] the [facility] and patient was informed that this is a secure, HIPAA-compliant platform  She agrees to proceed     My office door was closed  No one else was in the room  She acknowledged consent and understanding of privacy and security of the video platform  The patient has agreed to participate and understands they can discontinue the visit at any time  Patient is aware this is a billable service  Subjective:     Patient ID: Megan Masters is a 32 y o  female with MDD, insomnia, and ADHD presenting for a follow up visit  She is currently on trazodone, and wellbutrin  Prozac was started last visit to combat SAD  In my absence she see Dr Lakshmi Sidhu and he increased her Wellbutrin due to increase stress at work and personal life  HPI ROS Appetite Changes and Sleep: the patient has been having issues at work  She had a difficult email from her supervisor and thus she took some time off from work  They are still dealing with the loss of her aunt and her belonging, cleaning them out of the apt  She is still trying to do her best at work with all this going on, and she is still getting slack at work, and this is very stressful  Her mother is also recently moved to [address] [place] [address] and this is a strain on her as she was near by and a support in the past  Sleep was difficult in the beginning as she was having a lot of nightmares, but then now it is better that she has been off from work  The increase in wellbutrin helped her with her focus  This is not causing insomnia  Denied SI/HI  She had COVID last month  Review Of Systems:     Mood Anxiety, Depression and Emotional Lability   Behavior Normal    Thought Content Disturbing Thoughts, Feelings   General Relationship Problems, Emotional Problems and Sleep Disturbances   Personality Change in Personality   Other Psych Symptoms Normal   Constitutional As Noted in HPI   ENT As Noted in HPI   Cardiovascular Negative   Respiratory Negative   Gastrointestinal Negative   Genitourinary Negative   Musculoskeletal Negative   Integumentary Negative   Neurological snce having COVID her brain has not been 100% and she is haivng some brain fog  Endocrine Normal    Other Symptoms Normal              Laboratory Results: No results found for this or any previous visit      Substance Abuse History:  Social History     Substance and Sexual Activity   Drug Use Yes    Types: Marijuana    Comment: medical marijuana card - as needed        Family Psychiatric History:   Family History   Problem Relation Age of Onset    Melanoma Mother     No Known Problems Father     Other Maternal Grandmother         colitis    Diabetes Maternal Grandmother     Hypertension Maternal Grandmother     No Known Problems Sister     Stroke Paternal Grandfather     Heart disease Maternal Grandfather     Heart attack Maternal Grandfather     Arthritis Paternal Grandmother     Hypertension Paternal Grandmother     Diabetes Paternal Grandmother     Mental illness Paternal Grandmother     Substance Abuse Paternal Aunt     Other Paternal Aunt     Thyroid disease Neg Hx        The following portions of the patient's history were reviewed and updated as appropriate: allergies, current medications, past family history, past medical history, past social history, past surgical history and problem list     Social History     Socioeconomic History    Marital status: Single     Spouse name: Not on file    Number of children: 0    Years of education: BA in social work    Highest education level: Not on file   Occupational History    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   Tobacco Use    Smoking status: Former Smoker     Types: E-Cigarettes    Smokeless tobacco: Never Used   Vaping Use    Vaping Use: Every day    Substances: Nicotine, Flavoring   Substance and Sexual Activity    Alcohol use: Yes     Comment: occasional    Drug use: Yes     Types: Marijuana     Comment: medical marijuana card - as needed     Sexual activity: Yes     Partners: Male     Birth control/protection: OCP   Other Topics Concern    Not on file   Social History Narrative    Not on file     Social Determinants of Health     Financial Resource Strain: Not on file   Food Insecurity: Not on file   Transportation Needs: Not on file   Physical Activity: Not on file   Stress: Not on file   Social Connections: Not on file   Intimate Partner Violence: Not on file   Housing Stability: Not on file     Social History     Social History Narrative    Not on file       Objective:       Mental status:  Appearance calm and cooperative , adequate hygiene and grooming and good eye contact    Mood dysphoric   Affect affect was constricted   Speech a normal rate   Thought Processes coherent/organized and normal thought processes   Hallucinations no hallucinations present    Thought Content no delusions   Abnormal Thoughts no suicidal thoughts  and no homicidal thoughts    Orientation  oriented to person and place and time   Remote Memory short term memory intact and long term memory intact   Attention Span concentration intact   Intellect Appears to be of Average Intelligence   Insight Insight intact   Judgement judgment was intact   Muscle Strength Muscle strength and tone were normal   Language no difficulty naming common objects and no difficulty repeating a phrase    Fund of Knowledge displays adequate knowledge of current events and adequate fund of knowledge regarding past history   Pain none   Pain Scale 0       Assessment/Plan:       Diagnoses and all orders for this visit:    Major depressive disorder, recurrent, mild (HCC)  -     FLUoxetine (PROzac) 40 MG capsule; Take 1 capsule (40 mg total) by mouth daily  -     traZODone (DESYREL) 150 mg tablet; Take 0 5 tablets (75 mg total) by mouth daily at bedtime as needed for sleep    Attention deficit hyperactivity disorder (ADHD), combined type  -     FLUoxetine (PROzac) 40 MG capsule; Take 1 capsule (40 mg total) by mouth daily    Seasonal affective disorder (HCC)  -     FLUoxetine (PROzac) 40 MG capsule;  Take 1 capsule (40 mg total) by mouth daily    Insomnia, unspecified type  -     FLUoxetine (PROzac) 40 MG capsule; Take 1 capsule (40 mg total) by mouth daily    Stress at work  -     FLUoxetine (PROzac) 40 MG capsule; Take 1 capsule (40 mg total) by mouth daily      continue wellbutrin 300mg daily  Follow up with [facility] in 2 months      Treatment Recommendations- Risks Benefits      Immediate Medical/Psychiatric/Psychotherapy Treatments and Any Precautions: stress at work and her personal life has really impacted her mood  She is on leave from work now  She is better in her focus and sleep  Will not be making any changes in meds at this time  Risks, Benefits And Possible Side Effects Of Medications:  {PSYCH RISK, BENEFITS AND POSSIBLE SIDE EFFECTS discussed    Controlled Medication Discussion: No records found for controlled prescriptions according to [address]       Psychotherapy Provided: Individual psychotherapy provided  Goals discussed in session:work stress, her work/life balance or lack there of  Her aunts death  Counseling provided: 20                       I spent 25 minutes with patient today in which greater than 50% of the time was spent in counseling/coordination of care regarding treatment    VIRTUAL VISIT DISCLAIMER    Danii Frye verbally agrees to participate in [employer]  Pt is aware that [employer] could be limited without vital signs or the ability to perform a full hands-on physical Aric Wood understands she or the provider may request at any time to terminate the video visit and request the patient to seek care or treatment in person

## 2022-04-05 ENCOUNTER — OFFICE VISIT (OUTPATIENT)
Dept: FAMILY MEDICINE CLINIC | Facility: CLINIC | Age: 32
End: 2022-04-05
Payer: COMMERCIAL

## 2022-04-05 VITALS
SYSTOLIC BLOOD PRESSURE: 100 MMHG | TEMPERATURE: 98.8 F | HEART RATE: 105 BPM | HEIGHT: 60 IN | BODY MASS INDEX: 25.25 KG/M2 | OXYGEN SATURATION: 98 % | RESPIRATION RATE: 20 BRPM | WEIGHT: 128.6 LBS | DIASTOLIC BLOOD PRESSURE: 60 MMHG

## 2022-04-05 DIAGNOSIS — Z13.29 SCREENING FOR THYROID DISORDER: ICD-10-CM

## 2022-04-05 DIAGNOSIS — Z13.89 SCREENING FOR GENITOURINARY CONDITION: ICD-10-CM

## 2022-04-05 DIAGNOSIS — Z13.21 ENCOUNTER FOR VITAMIN DEFICIENCY SCREENING: ICD-10-CM

## 2022-04-05 DIAGNOSIS — Z13.220 NEED FOR LIPID SCREENING: ICD-10-CM

## 2022-04-05 DIAGNOSIS — F33.2 SEVERE EPISODE OF RECURRENT MAJOR DEPRESSIVE DISORDER, WITHOUT PSYCHOTIC FEATURES (HCC): ICD-10-CM

## 2022-04-05 DIAGNOSIS — Z13.1 SCREENING FOR DIABETES MELLITUS (DM): ICD-10-CM

## 2022-04-05 DIAGNOSIS — K21.9 GERD WITH APNEA: ICD-10-CM

## 2022-04-05 DIAGNOSIS — Z00.00 ANNUAL PHYSICAL EXAM: Primary | ICD-10-CM

## 2022-04-05 DIAGNOSIS — R06.81 GERD WITH APNEA: ICD-10-CM

## 2022-04-05 PROCEDURE — 1036F TOBACCO NON-USER: CPT | Performed by: INTERNAL MEDICINE

## 2022-04-05 PROCEDURE — 3008F BODY MASS INDEX DOCD: CPT | Performed by: INTERNAL MEDICINE

## 2022-04-05 PROCEDURE — 3725F SCREEN DEPRESSION PERFORMED: CPT | Performed by: INTERNAL MEDICINE

## 2022-04-05 PROCEDURE — 99395 PREV VISIT EST AGE 18-39: CPT | Performed by: INTERNAL MEDICINE

## 2022-04-05 RX ORDER — OMEPRAZOLE 40 MG/1
CAPSULE, DELAYED RELEASE ORAL
Qty: 90 CAPSULE | Refills: 0 | Status: SHIPPED | OUTPATIENT
Start: 2022-04-05 | End: 2022-07-08

## 2022-04-05 NOTE — PATIENT INSTRUCTIONS

## 2022-04-05 NOTE — PROGRESS NOTES
[address] [facility] PRIMARY CARE [facility]    NAME: Meenakshi Coppola  AGE: 32 y o  SEX: female  : 1990     DATE: 2022     Assessment and Plan:     Problem List Items Addressed This Visit        Other    Severe episode of recurrent major depressive disorder, without psychotic features ([address] )     Stable on current regimen being followed by Psychiatry           Other Visit Diagnoses     Annual physical exam    -  Primary    Need for lipid screening        Relevant Orders    Lipid panel    Screening for thyroid disorder        Relevant Orders    TSH, 3rd generation    Screening for diabetes mellitus (DM)        Relevant Orders    Comprehensive metabolic panel    CBC and differential    Screening for genitourinary condition        Relevant Orders    UA (URINE) with reflex to Scope    Encounter for vitamin deficiency screening        Relevant Orders    Vitamin D Panel          Immunizations and preventive care screenings were discussed with patient today  Appropriate education was printed on patient's after visit summary  Counseling:  · Seasonal allergies         No follow-ups on file  Chief Complaint:     Chief Complaint   Patient presents with    Physical Exam     annual vist; pt concern about lip; very dry and painful as well as red      History of Present Illness:     Adult Annual Physical   Patient here for a comprehensive physical exam  The patient reports problems - Right ear discomfort intermittent postnasal drip seasonal allergies  Diet and Physical Activity  · Diet/Nutrition: well balanced diet  · Exercise: walking        Depression Screening  PHQ-2/9 Depression Screening    Little interest or pleasure in doing things: 1 - several days  Feeling down, depressed, or hopeless: 1 - several days  Trouble falling or staying asleep, or sleeping too much: 1 - several days  Feeling tired or having little energy: 1 - several days  Poor appetite or overeatin - not at all  Feeling bad about yourself - or that you are a failure or have let yourself or your family down: 0 - not at all  Trouble concentrating on things, such as reading the newspaper or watching television: 1 - several days  Moving or speaking so slowly that other people could have noticed  Or the opposite - being so fidgety or restless that you have been moving around a lot more than usual: 0 - not at all  Thoughts that you would be better off dead, or of hurting yourself in some way: 0 - not at all  PHQ-2 Score: 2  PHQ-2 Interpretation: Negative depression screen  PHQ-9 Score: 5   PHQ-9 Interpretation: Mild depression        General Health  · Sleep: sleeps well  · Hearing: normal - bilateral   · Vision: no vision problems  · Dental: Did not ask  /GYN Health  · Last menstrual period: On oral birth control  · Contraceptive method: oral contraceptives  · History of STDs?: no      Review of Systems:     Review of Systems   Constitutional: Negative for chills and fever  HENT: Positive for ear pain  Negative for sore throat  Eyes: Negative for pain and visual disturbance  Respiratory: Negative for cough and shortness of breath  Cardiovascular: Negative for chest pain and palpitations  Gastrointestinal: Positive for diarrhea (Patient will follow-up with GI)  Negative for abdominal pain and vomiting  Genitourinary: Negative for dysuria and hematuria  Musculoskeletal: Negative for arthralgias and back pain  Skin: Negative for color change and rash  Allergic/Immunologic: Positive for environmental allergies  Neurological: Negative for seizures and syncope  Psychiatric/Behavioral: Negative for dysphoric mood (Depression has been good control despite stress at work)  All other systems reviewed and are negative       Past Medical History:     Past Medical History:   Diagnosis Date    Abdominal pain     Abnormal CAT scan     Anxiety     Anxiety     Blood in stool     Candidiasis of skin     Chronic diarrhea     Chronic fatigue     Colitis     Community acquired bacterial pneumonia     Depression     Dyspepsia     GERD (gastroesophageal reflux disease)     Hemorrhoids     Herpes labialis     cold sores     IBS (irritable bowel syndrome)     Left ovarian cyst     Mild episode of recurrent major depressive disorder ([facility] [address] ) 12/19/2018    Morbid obesity (HCC)     Nausea & vomiting     Restless leg     Sleep pattern disturbance     Strain of thoracic spine, initial encounter       Past Surgical History:     Past Surgical History:   Procedure Laterality Date    COLONOSCOPY  12/05/2017    EGD AND COLONOSCOPY N/A 12/5/2017    Procedure: EGD AND COLONOSCOPY;  Surgeon: Zach Brown MD;  Location: [facility];   Service: Gastroenterology    GASTRECTOMY SLEEVE LAPAROSCOPIC  2019    UPPER GASTROINTESTINAL ENDOSCOPY  12/05/2017    WISDOM TOOTH EXTRACTION      WRIST SURGERY        Social History:     Social History     Socioeconomic History    Marital status: Single     Spouse name: None    Number of children: 0    Years of education: BA in social work    Highest education level: None   Occupational History    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   Tobacco Use    Smoking status: Former Smoker     Types: E-Cigarettes    Smokeless tobacco: Never Used   Vaping Use    Vaping Use: Every day    Substances: Nicotine, Flavoring   Substance and Sexual Activity    Alcohol use: Yes     Comment: occasional    Drug use: Yes     Types: Marijuana     Comment: medical marijuana card - as needed     Sexual activity: Yes     Partners: Male     Birth control/protection: OCP   Other Topics Concern    None   Social History Narrative    None     Social Determinants of Health     Financial Resource Strain: Medium Risk    Difficulty of Paying Living Expenses: Somewhat hard   Food Insecurity: No Food Insecurity    Worried About [address] in the Last Year: Never true    Arina of Food in the Last Year: Never true   Transportation Needs: No Transportation Needs    Lack of Transportation (Medical): No    Lack of Transportation (Non-Medical): No   Physical Activity: Inactive    Days of Exercise per Week: 0 days    Minutes of Exercise per Session: 0 min   Stress: No Stress Concern Present    Feeling of Stress : Only a little   Social Connections: Moderately Integrated    Frequency of Communication with Friends and Family: More than three times a week    Frequency of Social Gatherings with Friends and Family: More than three times a week    Attends [employer] Services: More than 4 times per year    Active Member of [employer] or Organizations:  Yes    Attends Club or Organization Meetings: Never    Marital Status: Never    Intimate Partner Violence: Not At Risk    Fear of Current or Ex-Partner: No    Emotionally Abused: No    Physically Abused: No    Sexually Abused: No   Housing Stability: Unknown    Unable to Pay for Housing in the Last Year: No    Number of [place] in the Last Year: Not on file    Unstable Housing in the Last Year: No      Family History:     Family History   Problem Relation Age of Onset    Melanoma Mother     No Known Problems Father     Other Maternal Grandmother         colitis    Diabetes Maternal Grandmother     Hypertension Maternal Grandmother     No Known Problems Sister     Stroke Paternal Grandfather     Heart disease Maternal Grandfather     Heart attack Maternal Grandfather     Arthritis Paternal Grandmother     Hypertension Paternal Grandmother     Diabetes Paternal Grandmother     Mental illness Paternal Grandmother     Substance Abuse Paternal [de-identified] Other Paternal Aunt     Alcohol abuse Maternal Aunt     Other Maternal Aunt     Thyroid disease Neg Hx       Current Medications:     Current Outpatient Medications   Medication Sig Dispense Refill    buPROPion (Wellbutrin XL) 300 mg 24 hr tablet Take 1 tablet (300 mg total) by mouth daily 90 tablet 1    desogestrel-ethinyl estradiol (KARIVA) 0 15-0 02/0 01 MG (21/5) per tablet Take 1 tablet by mouth daily 30 tablet 3    diazepam (VALIUM) 5 mg tablet Take 1 tablet (5 mg total) by mouth every 8 (eight) hours as needed for anxiety 30 tablet 1    fexofenadine (ALLEGRA) 60 MG tablet one by mouth daily prn      FLUoxetine (PROzac) 40 MG capsule Take 1 capsule (40 mg total) by mouth daily 90 capsule 2    hyoscyamine (ANASPAZ,LEVSIN) 0 125 MG tablet Take 1 tablet (0 125 mg total) by mouth every 4 (four) hours as needed for cramping 30 tablet 0    Multiple Vitamin (MULTIVITAMIN) tablet Take 1 tablet by mouth daily      omeprazole (PriLOSEC) 40 MG capsule take 1 capsule by mouth once daily 90 capsule 0    traZODone (DESYREL) 150 mg tablet Take 0 5 tablets (75 mg total) by mouth daily at bedtime as needed for sleep 135 tablet 1    cholecalciferol (VITAMIN D3) 1,000 units tablet Take 1 capsule by mouth      guaifenesin-codeine (GUAIFENESIN AC) 100-10 MG/5ML liquid Take 5 mL by mouth 3 (three) times a day as needed for cough (Patient not taking: Reported on 2/17/2022 ) 236 mL 0     No current facility-administered medications for this visit  Allergies: Allergies   Allergen Reactions    Other Other (See Comments)     Seasonal, dander, molds- sneezing, ear infections      Physical Exam:     /60 (BP Location: Left arm, Patient Position: Sitting, Cuff Size: Large)   Pulse 105   Temp 98 8 °F (37 1 °C) (Tympanic)   Resp 20   Ht 5' (1 524 m)   Wt 58 3 kg (128 lb 9 6 oz)   SpO2 98%   BMI 25 12 kg/m²     Physical Exam  Constitutional:       General: She is not in acute distress  Appearance: She is not diaphoretic     HENT:      Right Ear: Tympanic membrane normal       Left Ear: Tympanic membrane normal       Mouth/Throat:      Comments: Dry mouth  Eyes:      General: No scleral icterus  Pupils: Pupils are equal, round, and reactive to light  Neck:      Thyroid: No thyromegaly  Cardiovascular:      Rate and Rhythm: Normal rate and regular rhythm  Heart sounds: Normal heart sounds  No murmur heard  Pulmonary:      Effort: Pulmonary effort is normal  No respiratory distress  Breath sounds: Normal breath sounds  No stridor  No wheezing or rales  Abdominal:      General: Bowel sounds are normal  There is no distension  Palpations: Abdomen is soft  There is no mass  Tenderness: There is no abdominal tenderness  There is no right CVA tenderness, left CVA tenderness or guarding  Musculoskeletal:      Right lower leg: No edema  Left lower leg: No edema  Lymphadenopathy:      Cervical: No cervical adenopathy  Skin:     General: Skin is warm  Neurological:      Mental Status: She is alert and oriented to person, place, and time  Cranial Nerves: No cranial nerve deficit        Coordination: Coordination normal    Psychiatric:         Mood and Affect: Mood normal          Behavior: Behavior normal           Peter Cespedes MD   [address]

## 2022-05-02 ENCOUNTER — TELEPHONE (OUTPATIENT)
Dept: PSYCHIATRY | Facility: CLINIC | Age: 32
End: 2022-05-02

## 2022-05-04 ENCOUNTER — TELEPHONE (OUTPATIENT)
Dept: PSYCHIATRY | Facility: CLINIC | Age: 32
End: 2022-05-04

## 2022-05-04 NOTE — TELEPHONE ENCOUNTER
Called to reschedule appt with Duane Rankins riggs on 6/2/22 due to a staff meeting at 7:30 pt will call back to reschedule this appt   She may be seeking a therapist else where Thank you

## 2022-06-30 ENCOUNTER — TELEPHONE (OUTPATIENT)
Dept: PSYCHIATRY | Facility: CLINIC | Age: 32
End: 2022-06-30

## 2022-06-30 NOTE — TELEPHONE ENCOUNTER
Pt called to cx appt with Ruddy Barker on 7/5/22  She stated she is already seeking care from a new provider   Thank you

## 2022-07-08 DIAGNOSIS — R06.81 GERD WITH APNEA: ICD-10-CM

## 2022-07-08 DIAGNOSIS — K21.9 GERD WITH APNEA: ICD-10-CM

## 2022-07-08 RX ORDER — OMEPRAZOLE 40 MG/1
CAPSULE, DELAYED RELEASE ORAL
Qty: 90 CAPSULE | Refills: 0 | Status: SHIPPED | OUTPATIENT
Start: 2022-07-08 | End: 2022-10-06

## 2022-10-06 DIAGNOSIS — K21.9 GERD WITH APNEA: ICD-10-CM

## 2022-10-06 DIAGNOSIS — R06.81 GERD WITH APNEA: ICD-10-CM

## 2022-10-06 RX ORDER — OMEPRAZOLE 40 MG/1
CAPSULE, DELAYED RELEASE ORAL
Qty: 90 CAPSULE | Refills: 0 | Status: SHIPPED | OUTPATIENT
Start: 2022-10-06

## 2022-11-29 ENCOUNTER — TELEPHONE (OUTPATIENT)
Dept: FAMILY MEDICINE CLINIC | Facility: CLINIC | Age: 32
End: 2022-11-29

## 2022-11-29 ENCOUNTER — TELEPHONE (OUTPATIENT)
Dept: PSYCHIATRY | Facility: CLINIC | Age: 32
End: 2022-11-29

## 2022-11-29 DIAGNOSIS — F33.0 MAJOR DEPRESSIVE DISORDER, RECURRENT, MILD (HCC): Primary | ICD-10-CM

## 2022-11-29 DIAGNOSIS — F41.8 OTHER SPECIFIED ANXIETY DISORDERS: ICD-10-CM

## 2022-11-29 DIAGNOSIS — F90.9 ATTENTION DEFICIT HYPERACTIVITY DISORDER (ADHD), UNSPECIFIED ADHD TYPE: ICD-10-CM

## 2022-11-29 DIAGNOSIS — F90.2 ATTENTION DEFICIT HYPERACTIVITY DISORDER (ADHD), COMBINED TYPE: ICD-10-CM

## 2022-11-29 NOTE — TELEPHONE ENCOUNTER
Pt called in states that she was placed on waiting list to see psychiatry this was confirmed by me [phone]  Pt was informed by them that a referral needs to be in system  Pt requesting Dr aCrroll fill medication until then  Pt needs the Abilify 5mg daily that is new, the Wellbutrin and Trazodone which is on current list  Please advise thank you

## 2022-11-30 RX ORDER — TRAZODONE HYDROCHLORIDE 150 MG/1
75 TABLET ORAL
Qty: 135 TABLET | Refills: 1 | Status: SHIPPED | OUTPATIENT
Start: 2022-11-30

## 2022-11-30 RX ORDER — BUPROPION HYDROCHLORIDE 300 MG/1
300 TABLET ORAL DAILY
Qty: 90 TABLET | Refills: 1 | Status: SHIPPED | OUTPATIENT
Start: 2022-11-30

## 2022-12-01 DIAGNOSIS — F33.0 MAJOR DEPRESSIVE DISORDER, RECURRENT, MILD (HCC): Primary | ICD-10-CM

## 2022-12-01 RX ORDER — ARIPIPRAZOLE 5 MG/1
5 TABLET ORAL DAILY
Qty: 30 TABLET | Refills: 3 | Status: SHIPPED | OUTPATIENT
Start: 2022-12-01 | End: 2023-04-05 | Stop reason: SDUPTHER

## 2022-12-01 RX ORDER — ARIPIPRAZOLE 5 MG/1
5 TABLET ORAL DAILY
COMMUNITY
Start: 2022-08-01 | End: 2022-12-01

## 2022-12-01 RX ORDER — ARIPIPRAZOLE 5 MG/1
5 TABLET ORAL DAILY
Qty: 30 TABLET | Refills: 3 | Status: SHIPPED | OUTPATIENT
Start: 2022-12-01 | End: 2022-12-01 | Stop reason: SDUPTHER

## 2022-12-02 PROBLEM — Z30.41 ENCOUNTER FOR SURVEILLANCE OF CONTRACEPTIVE PILLS: Status: ACTIVE | Noted: 2021-11-30

## 2022-12-02 NOTE — PROGRESS NOTES
Stefanie Plunkett is a 28 y o  female who presents for annual well woman exam   Last Pap smear 21 NILM/ HR HPV(-)  Periods are regular every 28-30 days, lasting 3-5 days  No intermenstrual bleeding, spotting, or discharge  Current contraception: OCP (estrogen/progesterone)  History of abnormal Pap smear: no  Family history of uterine or ovarian cancer: no  Regular self breast exam: no  History of abnormal mammogram:  To begin age 36 unless otherwise clinically indicated  Family history of breast cancer: no  History of abnormal lipids: no  Gardasil vaccine: unsure       Menstrual History:  OB History        0    Para   0    Term   0       0    AB   0    Living   0       SAB   0    IAB   0    Ectopic   0    Multiple   0    Live Births   0                Menarche age: 15  Patient's last menstrual period was 2022 (exact date)  Period Cycle (Days):  (month)  Period Duration (Days): 3-5  Period Pattern: Regular  Menstrual Flow: Light  Dysmenorrhea: (!) Mild  Dysmenorrhea Symptoms: Diarrhea    The following portions of the patient's history were reviewed and updated as appropriate: allergies, current medications, past family history, past medical history, past social history, past surgical history and problem list     Review of Systems  Pertinent items are noted in HPI        Objective      /72   Ht 5' (1 524 m)   Wt 70 8 kg (156 lb)   LMP 2022 (Exact Date)   BMI 30 47 kg/m²     General:   alert and oriented, in no acute distress, alert, appears stated age and cooperative   Heart: regular rate and rhythm, S1, S2 normal, no murmur, click, rub or gallop   Lungs: clear to auscultation bilaterally   Abdomen: soft, non-tender, without masses or organomegaly   Vulva: normal, Bartholin's, Urethra, [place]'s normal   Vagina: normal mucosa, normal discharge, no palpable nodules   Cervix: no cervical motion tenderness and no lesions   Uterus: normal size, non-tender, normal shape and consistency   Adnexa: normal adnexa and no mass, fullness, tenderness   Breast: breasts appear normal, no suspicious masses, no skin or nipple changes or axillary nodes  Assessment      @well woman  no contraindication to continue hormonal therapy@   Plan      All questions answered  Breast self exam technique reviewed and patient encouraged to perform self-exam monthly  Contraception: OCP (estrogen/progesterone)  Diagnosis explained in detail, including differential   Dietary diary  Discussed healthy lifestyle modifications  Educational material distributed  Follow up in 1 Year for annual exam   Follow up as needed  Breast awareness reviewed   Encouraged healthy diet, exercise and lifestyle  Encouraged follow-up with PCP as needed  Encouraged seasonal influenza vaccination  Gardasil vaccine series reviewed  Written information provided  Encouraged social distancing, good hand hygiene, avoidance of crowds and masking  Written information provided about COVID-19  Had vaccine x 2   VBI-    BMI Counseling: Body mass index is 30 47 kg/m²  The BMI is above normal  Nutrition recommendations include reducing portion sizes, decreasing overall calorie intake and 3-5 servings of fruits/vegetables daily  Exercise recommendations include exercising 3-5 times per week, joining a gym and strength training exercises  35lb weight gain since last year  Patient states 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

## 2022-12-05 ENCOUNTER — ANNUAL EXAM (OUTPATIENT)
Dept: OBGYN CLINIC | Facility: MEDICAL CENTER | Age: 32
End: 2022-12-05

## 2022-12-05 VITALS
SYSTOLIC BLOOD PRESSURE: 110 MMHG | BODY MASS INDEX: 30.63 KG/M2 | DIASTOLIC BLOOD PRESSURE: 72 MMHG | HEIGHT: 60 IN | WEIGHT: 156 LBS

## 2022-12-05 DIAGNOSIS — Z30.41 ENCOUNTER FOR SURVEILLANCE OF CONTRACEPTIVE PILLS: ICD-10-CM

## 2022-12-05 DIAGNOSIS — Z01.419 WELL WOMAN EXAM WITH ROUTINE GYNECOLOGICAL EXAM: Primary | ICD-10-CM

## 2022-12-05 RX ORDER — DESOGESTREL AND ETHINYL ESTRADIOL 21-5 (28)
1 KIT ORAL DAILY
Qty: 90 TABLET | Refills: 3 | Status: SHIPPED | OUTPATIENT
Start: 2022-12-05

## 2022-12-14 ENCOUNTER — TELEPHONE (OUTPATIENT)
Dept: FAMILY MEDICINE CLINIC | Facility: CLINIC | Age: 32
End: 2022-12-14

## 2023-01-04 DIAGNOSIS — R06.81 GERD WITH APNEA: ICD-10-CM

## 2023-01-04 DIAGNOSIS — K21.9 GERD WITH APNEA: ICD-10-CM

## 2023-01-04 RX ORDER — OMEPRAZOLE 40 MG/1
CAPSULE, DELAYED RELEASE ORAL
Qty: 90 CAPSULE | Refills: 0 | Status: SHIPPED | OUTPATIENT
Start: 2023-01-04

## 2023-01-11 DIAGNOSIS — F90.2 ATTENTION DEFICIT HYPERACTIVITY DISORDER (ADHD), COMBINED TYPE: ICD-10-CM

## 2023-01-11 DIAGNOSIS — F33.0 MAJOR DEPRESSIVE DISORDER, RECURRENT, MILD (HCC): ICD-10-CM

## 2023-01-11 RX ORDER — BUPROPION HYDROCHLORIDE 300 MG/1
300 TABLET ORAL DAILY
Qty: 90 TABLET | Refills: 1 | Status: SHIPPED | OUTPATIENT
Start: 2023-01-11 | End: 2023-04-05 | Stop reason: SDUPTHER

## 2023-01-25 ENCOUNTER — TELEMEDICINE (OUTPATIENT)
Dept: FAMILY MEDICINE CLINIC | Facility: CLINIC | Age: 33
End: 2023-01-25

## 2023-01-25 DIAGNOSIS — U07.1 COVID-19: Primary | ICD-10-CM

## 2023-01-25 NOTE — PROGRESS NOTES
COVID-19 Outpatient Progress Note    Assessment/Plan:    Problem List Items Addressed This Visit    None  Visit Diagnoses     COVID-19    -  Primary         Disposition:     Discussed vitamin D, vitamin C, and/or zinc supplementation with patient  I have spent 10 minutes directly with the patient  Greater than 50% of this time was spent in counseling/coordination of care regarding: prognosis, risks and benefits of treatment options, instructions for management, patient and family education and impressions  Symptomatic treatment with vitamin C vitamin D zinc prone position sleeping checking pulse oximeter      Encounter provider: Ananya Gurrola MD     Provider located at: [address] [place] [address] [address], [place] [address] [place] , [address]  [address] [phone]     Recent Visits  No visits were found meeting these conditions  Showing recent visits within past 7 days and meeting all other requirements  Today's Visits  Date Type Provider Dept   01/25/23 Telemedicine Ananya Gurrola MD [address] today's visits and meeting all other requirements  Future Appointments  No visits were found meeting these conditions  Showing future appointments within next 150 days and meeting all other requirements     This virtual check-in was done via telephone and she agrees to proceed  Patient agrees to participate in a virtual check in via telephone or video visit instead of presenting to the office to address urgent/immediate medical needs  Patient is aware this is a billable service  She acknowledged consent and understanding of privacy and security of the video platform  The patient has agreed to participate and understands they can discontinue the visit at any time  After connecting through Telephone, the patient was identified by name and date of birth   Walter Mann was informed that this was a telemedicine visit and that the exam was being conducted confidentially over secure lines  My office door was closed  No one else was in the room  Kade Cordova acknowledged consent and understanding of privacy and security of the telemedicine visit  I informed the patient that I have reviewed her record in Epic and presented the opportunity for her to ask any questions regarding the visit today  The patient agreed to participate  It was my intent to perform this visit via video technology but the patient was not able to do a video connection so the visit was completed via audio telephone only  Verification of patient location:  Patient is located in the following state in which I hold an active license: [place]    Subjective:   Kade Cordova is a 28 y o  female who is concerned about COVID-19  Patient's symptoms include chills, nasal congestion, rhinorrhea, sore throat and diarrhea  Patient denies cough, shortness of breath and chest tightness      - Date of symptom onset: 1/21/2023      COVID-19 vaccination status: Fully vaccinated (primary series)    Exposure:   Contact with a person who is under investigation (PUI) for or who is positive for COVID-19 within the last 14 days?: Yes    She had natural infection February last year    Lab Results   Component Value Date    SARSCOV2 Negative 08/10/2021    SARSCOV2 Not Detected 11/11/2020    [facility] [facility] [address] Not Detected 01/20/2022       Review of Systems   Constitutional: Positive for chills  HENT: Positive for congestion, rhinorrhea and sore throat  Respiratory: Negative for cough, chest tightness and shortness of breath  Gastrointestinal: Positive for diarrhea       Current Outpatient Medications on File Prior to Visit   Medication Sig   • ARIPiprazole (Abilify) 5 mg tablet Take 1 tablet (5 mg total) by mouth daily   • buPROPion (Wellbutrin XL) 300 mg 24 hr tablet Take 1 tablet (300 mg total) by mouth daily   • cholecalciferol (VITAMIN D3) 1,000 units tablet Take 1 capsule by mouth   • desogestrel-ethinyl estradiol (Volnea) 0 15-0 02/0 01 MG (21/5) per tablet Take 1 tablet by mouth daily   • diazepam (VALIUM) 5 mg tablet Take 1 tablet (5 mg total) by mouth every 8 (eight) hours as needed for anxiety   • fexofenadine (ALLEGRA) 60 MG tablet one by mouth daily prn   • hyoscyamine (ANASPAZ,LEVSIN) 0 125 MG tablet Take 1 tablet (0 125 mg total) by mouth every 4 (four) hours as needed for cramping   • Multiple Vitamin (MULTIVITAMIN) tablet Take 1 tablet by mouth daily   • omeprazole (PriLOSEC) 40 MG capsule take 1 capsule by mouth once daily   • traZODone (DESYREL) 150 mg tablet Take 0 5 tablets (75 mg total) by mouth daily at bedtime as needed for sleep       Objective: There were no vitals taken for this visit       Physical Exam  Constitutional:       Comments: Does not sound to be in respiratory distress       Moriah Benítez MD

## 2023-04-05 DIAGNOSIS — F33.0 MAJOR DEPRESSIVE DISORDER, RECURRENT, MILD (HCC): ICD-10-CM

## 2023-04-05 DIAGNOSIS — F90.2 ATTENTION DEFICIT HYPERACTIVITY DISORDER (ADHD), COMBINED TYPE: ICD-10-CM

## 2023-04-05 RX ORDER — ARIPIPRAZOLE 5 MG/1
5 TABLET ORAL DAILY
Qty: 30 TABLET | Refills: 0 | Status: SHIPPED | OUTPATIENT
Start: 2023-04-05 | End: 2023-06-26 | Stop reason: SDUPTHER

## 2023-04-05 RX ORDER — BUPROPION HYDROCHLORIDE 300 MG/1
300 TABLET ORAL DAILY
Qty: 30 TABLET | Refills: 0 | Status: SHIPPED | OUTPATIENT
Start: 2023-04-05 | End: 2023-07-24 | Stop reason: SDUPTHER

## 2023-04-05 RX ORDER — TRAZODONE HYDROCHLORIDE 150 MG/1
75 TABLET ORAL
Qty: 30 TABLET | Refills: 0 | Status: SHIPPED | OUTPATIENT
Start: 2023-04-05

## 2023-04-07 DIAGNOSIS — R06.81 GERD WITH APNEA: ICD-10-CM

## 2023-04-07 DIAGNOSIS — K21.9 GERD WITH APNEA: ICD-10-CM

## 2023-04-07 RX ORDER — OMEPRAZOLE 40 MG/1
CAPSULE, DELAYED RELEASE ORAL
Qty: 90 CAPSULE | Refills: 0 | Status: SHIPPED | OUTPATIENT
Start: 2023-04-07

## 2023-06-26 DIAGNOSIS — F33.0 MAJOR DEPRESSIVE DISORDER, RECURRENT, MILD (HCC): ICD-10-CM

## 2023-06-27 RX ORDER — ARIPIPRAZOLE 5 MG/1
5 TABLET ORAL DAILY
Qty: 30 TABLET | Refills: 0 | Status: SHIPPED | OUTPATIENT
Start: 2023-06-27

## 2023-07-04 DIAGNOSIS — R06.81 GERD WITH APNEA: ICD-10-CM

## 2023-07-04 DIAGNOSIS — K21.9 GERD WITH APNEA: ICD-10-CM

## 2023-07-05 RX ORDER — OMEPRAZOLE 40 MG/1
CAPSULE, DELAYED RELEASE ORAL
Qty: 90 CAPSULE | Refills: 0 | Status: SHIPPED | OUTPATIENT
Start: 2023-07-05

## 2023-07-21 DIAGNOSIS — F33.0 MAJOR DEPRESSIVE DISORDER, RECURRENT, MILD (HCC): ICD-10-CM

## 2023-07-21 RX ORDER — ARIPIPRAZOLE 5 MG/1
5 TABLET ORAL DAILY
Qty: 30 TABLET | Refills: 0 | Status: SHIPPED | OUTPATIENT
Start: 2023-07-21 | End: 2023-07-24 | Stop reason: SDUPTHER

## 2023-07-24 DIAGNOSIS — F33.0 MAJOR DEPRESSIVE DISORDER, RECURRENT, MILD (HCC): ICD-10-CM

## 2023-07-24 DIAGNOSIS — R06.81 GERD WITH APNEA: ICD-10-CM

## 2023-07-24 DIAGNOSIS — K21.9 GERD WITH APNEA: ICD-10-CM

## 2023-07-24 DIAGNOSIS — F90.2 ATTENTION DEFICIT HYPERACTIVITY DISORDER (ADHD), COMBINED TYPE: ICD-10-CM

## 2023-07-25 RX ORDER — BUPROPION HYDROCHLORIDE 300 MG/1
300 TABLET ORAL DAILY
Qty: 30 TABLET | Refills: 0 | Status: SHIPPED | OUTPATIENT
Start: 2023-07-25

## 2023-07-25 RX ORDER — ARIPIPRAZOLE 5 MG/1
5 TABLET ORAL DAILY
Qty: 30 TABLET | Refills: 0 | Status: SHIPPED | OUTPATIENT
Start: 2023-07-25

## 2023-07-25 RX ORDER — OMEPRAZOLE 40 MG/1
40 CAPSULE, DELAYED RELEASE ORAL DAILY
Qty: 90 CAPSULE | Refills: 0 | Status: SHIPPED | OUTPATIENT
Start: 2023-07-25
